# Patient Record
Sex: FEMALE | Race: WHITE | NOT HISPANIC OR LATINO | Employment: FULL TIME | URBAN - NONMETROPOLITAN AREA
[De-identification: names, ages, dates, MRNs, and addresses within clinical notes are randomized per-mention and may not be internally consistent; named-entity substitution may affect disease eponyms.]

---

## 2017-02-02 ENCOUNTER — OFFICE VISIT (OUTPATIENT)
Dept: FAMILY MEDICINE CLINIC | Facility: CLINIC | Age: 37
End: 2017-02-02

## 2017-02-02 VITALS
HEIGHT: 69 IN | SYSTOLIC BLOOD PRESSURE: 108 MMHG | OXYGEN SATURATION: 98 % | TEMPERATURE: 97.7 F | DIASTOLIC BLOOD PRESSURE: 76 MMHG | WEIGHT: 160 LBS | BODY MASS INDEX: 23.7 KG/M2 | HEART RATE: 80 BPM

## 2017-02-02 DIAGNOSIS — N76.0 ACUTE VAGINITIS: Primary | ICD-10-CM

## 2017-02-02 PROCEDURE — 99213 OFFICE O/P EST LOW 20 MIN: CPT | Performed by: FAMILY MEDICINE

## 2017-02-02 RX ORDER — METRONIDAZOLE 500 MG/1
500 TABLET ORAL 2 TIMES DAILY
Qty: 14 TABLET | Refills: 0 | Status: SHIPPED | OUTPATIENT
Start: 2017-02-02 | End: 2017-03-30

## 2017-02-02 RX ORDER — FLUCONAZOLE 150 MG/1
TABLET ORAL
Qty: 2 TABLET | Refills: 0 | Status: SHIPPED | OUTPATIENT
Start: 2017-02-02 | End: 2017-03-30

## 2017-02-02 NOTE — PATIENT INSTRUCTIONS
Monilial Vaginitis  Vaginitis in a soreness, swelling and redness (inflammation) of the vagina and vulva. Monilial vaginitis is not a sexually transmitted infection.  CAUSES   Yeast vaginitis is caused by yeast (candida) that is normally found in your vagina. With a yeast infection, the candida has overgrown in number to a point that upsets the chemical balance.  SYMPTOMS   · White, thick vaginal discharge.  · Swelling, itching, redness and irritation of the vagina and possibly the lips of the vagina (vulva).  · Burning or painful urination.  · Painful intercourse.  DIAGNOSIS   Things that may contribute to monilial vaginitis are:  · Postmenopausal and virginal states.  · Pregnancy.  · Infections.  · Being tired, sick or stressed, especially if you had monilial vaginitis in the past.  · Diabetes. Good control will help lower the chance.  · Birth control pills.  · Tight fitting garments.  · Using bubble bath, feminine sprays, douches or deodorant tampons.  · Taking certain medications that kill germs (antibiotics).  · Sporadic recurrence can occur if you become ill.  TREATMENT   Your caregiver will give you medication.  · There are several kinds of anti monilial vaginal creams and suppositories specific for monilial vaginitis. For recurrent yeast infections, use a suppository or cream in the vagina 2 times a week, or as directed.  · Anti-monilial or steroid cream for the itching or irritation of the vulva may also be used. Get your caregiver's permission.  · Painting the vagina with methylene blue solution may help if the monilial cream does not work.  · Eating yogurt may help prevent monilial vaginitis.  HOME CARE INSTRUCTIONS   · Finish all medication as prescribed.  · Do not have sex until treatment is completed or after your caregiver tells you it is okay.  · Take warm sitz baths.  · Do not douche.  · Do not use tampons, especially scented ones.  · Wear cotton underwear.  · Avoid tight pants and panty  hose.  · Tell your sexual partner that you have a yeast infection. They should go to their caregiver if they have symptoms such as mild rash or itching.  · Your sexual partner should be treated as well if your infection is difficult to eliminate.  · Practice safer sex. Use condoms.  · Some vaginal medications cause latex condoms to fail. Vaginal medications that harm condoms are:  ¨ Cleocin cream.  ¨ Butoconazole (Femstat®).  ¨ Terconazole (Terazol®) vaginal suppository.  ¨ Miconazole (Monistat®) (may be purchased over the counter).  SEEK MEDICAL CARE IF:   · You have a temperature by mouth above 102° F (38.9° C).  · The infection is getting worse after 2 days of treatment.  · The infection is not getting better after 3 days of treatment.  · You develop blisters in or around your vagina.  · You develop vaginal bleeding, and it is not your menstrual period.  · You have pain when you urinate.  · You develop intestinal problems.  · You have pain with sexual intercourse.     This information is not intended to replace advice given to you by your health care provider. Make sure you discuss any questions you have with your health care provider.     Document Released: 09/27/2006 Document Revised: 03/11/2013 Document Reviewed: 06/20/2016  [employer] Interactive Patient Education ©2016 [employer]      Bacterial Vaginosis  Bacterial vaginosis is a vaginal infection that occurs when the normal balance of bacteria in the vagina is disrupted. It results from an overgrowth of certain bacteria. This is the most common vaginal infection in women of childbearing age. Treatment is important to prevent complications, especially in pregnant women, as it can cause a premature delivery.  CAUSES   Bacterial vaginosis is caused by an increase in harmful bacteria that are normally present in smaller amounts in the vagina. Several different kinds of bacteria can cause bacterial vaginosis. However, the reason that the condition develops is  not fully understood.  RISK FACTORS  Certain activities or behaviors can put you at an increased risk of developing bacterial vaginosis, including:  · Having a new sex partner or multiple sex partners.  · Douching.  · Using an intrauterine device (IUD) for contraception.  Women do not get bacterial vaginosis from toilet seats, bedding, swimming pools, or contact with objects around them.  SIGNS AND SYMPTOMS   Some women with bacterial vaginosis have no signs or symptoms. Common symptoms include:  · Grey vaginal discharge.  · A fishlike odor with discharge, especially after sexual intercourse.  · Itching or burning of the vagina and vulva.  · Burning or pain with urination.  DIAGNOSIS   Your health care provider will take a medical history and examine the vagina for signs of bacterial vaginosis. A sample of vaginal fluid may be taken. Your health care provider will look at this sample under a microscope to check for bacteria and abnormal cells. A vaginal pH test may also be done.   TREATMENT   Bacterial vaginosis may be treated with antibiotic medicines. These may be given in the form of a pill or a vaginal cream. A second round of antibiotics may be prescribed if the condition comes back after treatment. Because bacterial vaginosis increases your risk for sexually transmitted diseases, getting treated can help reduce your risk for chlamydia, gonorrhea, HIV, and herpes.  HOME CARE INSTRUCTIONS   · Only take over-the-counter or prescription medicines as directed by your health care provider.  · If antibiotic medicine was prescribed, take it as directed. Make sure you finish it even if you start to feel better.  · Tell all sexual partners that you have a vaginal infection. They should see their health care provider and be treated if they have problems, such as a mild rash or itching.  · During treatment, it is important that you follow these instructions:  ¨ Avoid sexual activity or use condoms correctly.  ¨ Do not  douche.  ¨ Avoid alcohol as directed by your health care provider.  ¨ Avoid breastfeeding as directed by your health care provider.  SEEK MEDICAL CARE IF:   · Your symptoms are not improving after 3 days of treatment.  · You have increased discharge or pain.  · You have a fever.  MAKE SURE YOU:   · Understand these instructions.  · Will watch your condition.  · Will get help right away if you are not doing well or get worse.  FOR MORE INFORMATION   Centers for Disease Control and Prevention, Division of STD Prevention: www.cdc.gov/std  American Sexual Health Association (GENIE): www.ashastd.org      This information is not intended to replace advice given to you by your health care provider. Make sure you discuss any questions you have with your health care provider.     Document Released: 12/18/2006 Document Revised: 01/08/2016 Document Reviewed: 07/30/2014  Elsevier Interactive Patient Education ©2016 [employer]

## 2017-02-03 NOTE — PROGRESS NOTES
Subjective   Leigh Negron is a 37 y.o. female.     Vaginal Discharge   The patient's primary symptoms include vaginal discharge. The patient's pertinent negatives include no genital lesions or pelvic pain. Primary symptoms comment: rectal itching as well. This is a new problem. The current episode started 1 to 4 weeks ago. The problem occurs constantly. The problem has been gradually worsening. The patient is experiencing no pain. She is not pregnant (IUD in place). Pertinent negatives include no abdominal pain, chills, diarrhea, discolored urine, dysuria, fever, hematuria, joint swelling, nausea, painful intercourse, rash, sore throat, urgency or vomiting. The vaginal discharge was grey, white and thick. There has been no bleeding. The symptoms are aggravated by intercourse. She has tried nothing for the symptoms. She is sexually active. No, her partner does not have an STD. She uses an IUD for contraception.   PAP normal 8/2016.    Review of Systems   Constitutional: Negative for chills, fatigue, fever and unexpected weight change.   HENT: Negative.  Negative for sore throat.    Eyes: Negative.  Negative for discharge.   Respiratory: Negative.    Cardiovascular: Negative.    Gastrointestinal: Negative for abdominal pain, blood in stool, diarrhea, nausea and vomiting.   Genitourinary: Positive for vaginal discharge. Negative for dysuria, genital sores, hematuria, pelvic pain and urgency.        See HPI   Musculoskeletal: Negative.    Skin: Negative for rash.        See HPI   Neurological: Negative.      Objective    Vitals:    02/02/17 0811   BP: 108/76   Pulse: 80   Temp: 97.7 °F (36.5 °C)   SpO2: 98%     Body mass index is 23.63 kg/(m^2).  Last 2 weights    02/02/17  0811   Weight: 160 lb (72.6 kg)     Physical Exam   Constitutional: She is oriented to person, place, and time. She appears well-developed and well-nourished. She is cooperative. She does not appear ill. No distress.   HENT:   Mouth/Throat:  Oropharynx is clear and moist and mucous membranes are normal. Mucous membranes are not dry.   Eyes: Conjunctivae and lids are normal.   Abdominal: Soft. There is no tenderness.   Genitourinary: Rectal exam shows no external hemorrhoid, no fissure (no rash/lesion) and no mass. Pelvic exam was performed with patient supine. There is no rash or lesion on the right labia. There is no rash or lesion on the left labia. No erythema, tenderness or bleeding in the vagina. Vaginal discharge (thick, white/gray, moderate amount) found.   Neurological: She is alert and oriented to person, place, and time. Gait normal.   Skin: Skin is warm and dry. No lesion and no rash noted.   Psychiatric: She has a normal mood and affect. Her behavior is normal.   Nursing note and vitals reviewed.    Assessment/Plan   Leigh was seen today for anal itching and vaginal discharge.    Diagnoses and all orders for this visit:    Acute vaginitis  -     metroNIDAZOLE (FLAGYL) 500 MG tablet; Take 1 tablet by mouth 2 (Two) Times a Day.  -     fluconazole (DIFLUCAN) 150 MG tablet; 1 po now and repeat in 1 week     Suspect candidiasis +/- BV. Cover with meds as above.   GC/Chlamydia testing pending.  Vaginosis Panel pending.  I will contact patient regarding test results and provide instructions regarding any necessary changes in plan of care.  Patient was encouraged to keep me informed of any acute changes, lack of improvement, or any new concerning symptoms.  Educational handouts describing preventive measures provided.  Patient voiced understanding of all instructions and denied further questions.

## 2017-03-30 ENCOUNTER — OFFICE VISIT (OUTPATIENT)
Dept: FAMILY MEDICINE CLINIC | Facility: CLINIC | Age: 37
End: 2017-03-30

## 2017-03-30 VITALS
WEIGHT: 158 LBS | BODY MASS INDEX: 23.4 KG/M2 | HEART RATE: 84 BPM | DIASTOLIC BLOOD PRESSURE: 80 MMHG | OXYGEN SATURATION: 98 % | SYSTOLIC BLOOD PRESSURE: 118 MMHG | HEIGHT: 69 IN

## 2017-03-30 DIAGNOSIS — B37.31 VAGINAL CANDIDIASIS: ICD-10-CM

## 2017-03-30 LAB
BILIRUB BLD-MCNC: NEGATIVE MG/DL
CLARITY, POC: ABNORMAL
COLOR UR: YELLOW
GLUCOSE UR STRIP-MCNC: NEGATIVE MG/DL
KETONES UR QL: NEGATIVE
LEUKOCYTE EST, POC: NEGATIVE
NITRITE UR-MCNC: NEGATIVE MG/ML
PH UR: 6 [PH] (ref 5–8)
PROT UR STRIP-MCNC: NEGATIVE MG/DL
RBC # UR STRIP: NEGATIVE /UL
SP GR UR: 1.02 (ref 1–1.03)
UROBILINOGEN UR QL: NORMAL

## 2017-03-30 PROCEDURE — 81003 URINALYSIS AUTO W/O SCOPE: CPT | Performed by: NURSE PRACTITIONER

## 2017-03-30 PROCEDURE — 99213 OFFICE O/P EST LOW 20 MIN: CPT | Performed by: NURSE PRACTITIONER

## 2017-03-30 RX ORDER — FLUCONAZOLE 150 MG/1
150 TABLET ORAL ONCE
Qty: 2 TABLET | Refills: 0 | Status: SHIPPED | OUTPATIENT
Start: 2017-03-30 | End: 2017-03-30

## 2017-03-30 NOTE — PROGRESS NOTES
Subjective   Leigh Negron is a 37 y.o. female.     HPI Comments: The patient is here with complaints of clumpy, white vaginal discharge and itching since having intercourse with her partner yesterday.  She states she also had BV and a yeast infection last time they had intercourse.  She states the two have a long sexual history and neither have ever had any trouble until now.  They broke up a few months ago but have recently gotten back together and she states both of her recent vaginal infections correlate with their sexual encounters.  She states her partner has no symptoms and has also had urine STD testing which was negative.  He does have frequent fungal rashes on his skin, especially under his arms.  He is scheduled to have further blood testing on Monday.  She was screened at her last visit for STDs, DM II, HIV, and Hepatitis which were all negative; she was positive for BV and yeast.  She is interested in figuring out why she is suddenly having vaginal problems after sex.  She has an IUD and has not recently taken antibiotics.  She does not douche.  She uses tampons, washes with regular soap, wears cotton underwear, avoids baths, and keeps herself clean.  Again, she states she never has any issues until they have sex which wasn't an issue until getting back together recently after before breaking up a few months ago.  She used to follow with GYN but has not been back because she felt like nothing was being done to figure out why she is suddenly getting vaginal infections.  She does have genital herpes which is managed with daily Valtrex and she is not currently experiencing an outbreak.       The following portions of the patient's history were reviewed and updated as appropriate: allergies, current medications, past family history, past medical history, past social history, past surgical history and problem list.    Review of Systems   Constitutional: Negative.    HENT: Negative.    Eyes: Negative.     Respiratory: Negative.    Cardiovascular: Negative.    Gastrointestinal: Negative.    Endocrine: Negative.    Genitourinary: Positive for vaginal discharge. Negative for difficulty urinating, dyspareunia, dysuria, genital sores, hematuria and menstrual problem.        Itching, discomfort   Musculoskeletal: Negative.    Skin: Negative.    Allergic/Immunologic: Negative.    Neurological: Negative for dizziness, syncope, weakness and numbness.   Hematological: Negative.  Negative for adenopathy.   Psychiatric/Behavioral: Negative.  Negative for confusion and suicidal ideas. The patient is not nervous/anxious.        Objective   Physical Exam   Constitutional: She is oriented to person, place, and time. Vital signs are normal. She appears well-developed and well-nourished. No distress.   Eyes: Lids are normal.   Cardiovascular: Normal rate.    Pulmonary/Chest: Effort normal.   Neurological: She is alert and oriented to person, place, and time.   Skin: Skin is warm and dry. No rash noted.   Psychiatric: She has a normal mood and affect. Her behavior is normal. Judgment and thought content normal.   Nursing note and vitals reviewed.      Assessment/Plan   Leigh was seen today for vaginal discharge.    Diagnoses and all orders for this visit:    Vaginal candidiasis  -     fluconazole (DIFLUCAN) 150 MG tablet; Take 1 tablet by mouth 1 (One) Time for 1 dose. Take one now and one in 7 days       POC urinalysis performed in office today negative.  Diflucan prescribed for treatment of vaginal candidiasis.  I informed the patient she could also use OTC treatments such as Monistat in addition to the oral treatment.  I advised the patient to take a daily women's probiotic, to eat yogurt / cottage cheese, avoid tampons, douching, baths, change underwear often, and use condoms during sex.  We also spoke about her partner possibly needing a urethral swab for further testing / culture.  She states her partner is prone to outer  skin yeast rashes so we discussed the possibility of him having diabetes.  We also talked about possibly referring the patient to a different GYN and or ID.      Patient was encouraged to keep me informed of any acute changes, lack of improvement, or any new concerning symptoms.  Patient voiced understanding of all instructions and denied further questions.    Patient to follow up PRN.

## 2017-06-16 RX ORDER — VALACYCLOVIR HYDROCHLORIDE 500 MG/1
500 TABLET, FILM COATED ORAL DAILY
Qty: 90 TABLET | Refills: 1 | OUTPATIENT
Start: 2017-06-16 | End: 2018-09-12 | Stop reason: SDUPTHER

## 2017-06-30 ENCOUNTER — TELEPHONE (OUTPATIENT)
Dept: FAMILY MEDICINE CLINIC | Facility: CLINIC | Age: 37
End: 2017-06-30

## 2017-06-30 ENCOUNTER — HOSPITAL ENCOUNTER (OUTPATIENT)
Dept: ULTRASOUND IMAGING | Facility: HOSPITAL | Age: 37
Discharge: HOME | End: 2017-06-30
Admitting: FAMILY MEDICINE

## 2017-06-30 ENCOUNTER — OFFICE VISIT (OUTPATIENT)
Dept: FAMILY MEDICINE CLINIC | Facility: CLINIC | Age: 37
End: 2017-06-30

## 2017-06-30 ENCOUNTER — APPOINTMENT (OUTPATIENT)
Dept: LAB | Facility: HOSPITAL | Age: 37
End: 2017-06-30

## 2017-06-30 VITALS
HEIGHT: 69 IN | SYSTOLIC BLOOD PRESSURE: 108 MMHG | OXYGEN SATURATION: 99 % | WEIGHT: 161 LBS | BODY MASS INDEX: 23.85 KG/M2 | HEART RATE: 82 BPM | TEMPERATURE: 98.4 F | DIASTOLIC BLOOD PRESSURE: 70 MMHG

## 2017-06-30 DIAGNOSIS — R10.32 LLQ PAIN: ICD-10-CM

## 2017-06-30 DIAGNOSIS — R10.32 LLQ PAIN: Primary | ICD-10-CM

## 2017-06-30 DIAGNOSIS — R82.90 ABNORMAL URINALYSIS: ICD-10-CM

## 2017-06-30 LAB
BILIRUB BLD-MCNC: NEGATIVE MG/DL
CLARITY, POC: CLEAR
COLOR UR: YELLOW
GLUCOSE UR STRIP-MCNC: NEGATIVE MG/DL
KETONES UR QL: NEGATIVE
LEUKOCYTE EST, POC: ABNORMAL
NITRITE UR-MCNC: NEGATIVE MG/ML
PH UR: 6.5 [PH] (ref 5–8)
PROT UR STRIP-MCNC: NEGATIVE MG/DL
RBC # UR STRIP: NEGATIVE /UL
SP GR UR: 1.01 (ref 1–1.03)
UROBILINOGEN UR QL: NORMAL

## 2017-06-30 PROCEDURE — 76830 TRANSVAGINAL US NON-OB: CPT

## 2017-06-30 PROCEDURE — 99214 OFFICE O/P EST MOD 30 MIN: CPT | Performed by: FAMILY MEDICINE

## 2017-06-30 PROCEDURE — 81003 URINALYSIS AUTO W/O SCOPE: CPT | Performed by: FAMILY MEDICINE

## 2017-06-30 RX ORDER — DOXYCYCLINE 100 MG/1
100 TABLET ORAL 2 TIMES DAILY
Qty: 20 TABLET | Refills: 0 | Status: SHIPPED | OUTPATIENT
Start: 2017-06-30 | End: 2017-06-30 | Stop reason: SDUPTHER

## 2017-06-30 RX ORDER — DOXYCYCLINE 100 MG/1
100 TABLET ORAL 2 TIMES DAILY
Qty: 20 TABLET | Refills: 0 | Status: SHIPPED | OUTPATIENT
Start: 2017-06-30 | End: 2017-07-10

## 2017-06-30 RX ORDER — METRONIDAZOLE 500 MG/1
500 TABLET ORAL 2 TIMES DAILY
Qty: 20 TABLET | Refills: 0 | Status: SHIPPED | OUTPATIENT
Start: 2017-06-30 | End: 2018-05-15

## 2017-06-30 RX ORDER — METRONIDAZOLE 500 MG/1
500 TABLET ORAL 2 TIMES DAILY
Qty: 20 TABLET | Refills: 0 | Status: SHIPPED | OUTPATIENT
Start: 2017-06-30 | End: 2017-06-30 | Stop reason: SDUPTHER

## 2017-06-30 NOTE — TELEPHONE ENCOUNTER
----- Message from Rosa Isela Lewis sent at 6/30/2017  4:11 PM EDT -----  Regarding: RESULTS   Pt informed of US results and instructed to  flagyl at her pharmacy. Pt voiced understanding and had no questions.

## 2017-06-30 NOTE — PROGRESS NOTES
Subjective   Leigh Negron is a 37 y.o. female.     Abdominal Pain   This is a new problem. The current episode started in the past 7 days. The onset quality is gradual. The problem occurs constantly. The problem has been gradually worsening. The pain is located in the LLQ. The pain is moderate. The quality of the pain is sharp (stabbing). The abdominal pain radiates to the suprapubic region. Associated symptoms include constipation (but better after use of laxative), a fever (subjective 2 days ago; resolved) and nausea. Pertinent negatives include no anorexia, arthralgias, diarrhea, dysuria, frequency, headaches, hematochezia, hematuria, melena, myalgias, vomiting or weight loss. The pain is aggravated by movement, certain positions and urination. The pain is relieved by being still. She has tried nothing for the symptoms. Her past medical history is significant for abdominal surgery (CS).   Used CRYS cup for menses for the first time which began 6/21. Now resolved x 3-4 days.     The following portions of the patient's history were reviewed and updated as appropriate: allergies, current medications, past family history, past medical history, past social history, past surgical history and problem list.    Review of Systems   Constitutional: Positive for fatigue and fever (subjective 2 days ago; resolved). Negative for unexpected weight change and weight loss.   HENT: Negative.    Eyes: Negative.    Respiratory: Negative.    Cardiovascular: Negative.    Gastrointestinal: Positive for abdominal pain, constipation (but better after use of laxative) and nausea. Negative for anorexia, blood in stool, diarrhea, hematochezia, melena, rectal pain and vomiting.   Genitourinary: Negative for dyspareunia, dysuria, frequency, hematuria, urgency and vaginal discharge.   Musculoskeletal: Negative for arthralgias and myalgias.   Skin: Negative for rash.   Neurological: Negative.  Negative for headaches.    Psychiatric/Behavioral: Negative.        Objective    Vitals:    06/30/17 1021   BP: 108/70   Pulse: 82   Temp: 98.4 °F (36.9 °C)   SpO2: 99%     Body mass index is 23.78 kg/(m^2).  Last 2 weights    06/30/17  1021   Weight: 161 lb (73 kg)       Physical Exam   Constitutional: She is oriented to person, place, and time. She appears well-developed and well-nourished. She is cooperative. She does not appear ill. No distress.   HENT:   Head: Normocephalic and atraumatic.   Mouth/Throat: Mucous membranes are normal. Mucous membranes are not dry.   Eyes: Conjunctivae and lids are normal.   Neck: Phonation normal. Neck supple. Normal carotid pulses present. No thyroid mass and no thyromegaly present.   Cardiovascular: Normal rate, regular rhythm and intact distal pulses.    Pulmonary/Chest: Effort normal and breath sounds normal.   Abdominal: Soft. She exhibits no distension and no mass. Bowel sounds are increased. There is no hepatosplenomegaly. There is tenderness (moderate) in the suprapubic area and left lower quadrant. There is guarding (mild voluntary). There is no rigidity, no rebound and no CVA tenderness.           Vascular Status -  Her exam exhibits no right foot edema. Her exam exhibits no left foot edema.  Lymphadenopathy:     She has no cervical adenopathy.   Neurological: She is alert and oriented to person, place, and time. She has normal strength. Gait normal.   Skin: Skin is warm and dry. No bruising and no rash noted.   Multiple tattoos   Psychiatric: She has a normal mood and affect. Her behavior is normal. Cognition and memory are normal.   Nursing note and vitals reviewed.    Recent Results (from the past 24 hour(s))   POC Urinalysis Dipstick, Automated    Collection Time: 06/30/17 11:50 AM   Result Value Ref Range    Color Yellow Yellow, Straw, Dark Yellow, Annabel    Clarity, UA Clear Clear    Glucose, UA Negative Negative, 1000 mg/dL (3+) mg/dL    Bilirubin Negative Negative    Ketones, UA  Negative Negative    Specific Gravity  1.015 1.005 - 1.030    Blood, UA Negative Negative    pH, Urine 6.5 5.0 - 8.0    Protein, POC Negative Negative mg/dL    Urobilinogen, UA Normal Normal    Leukocytes 25 Juvencio/ul (A) Negative    Nitrite, UA Negative Negative     Assessment/Plan   Crystal was seen today for abdominal pain.    Diagnoses and all orders for this visit:    LLQ pain  -     US Non-ob Transvaginal; Future  -     Cancel: C Trachomatis / N Gonorrhoeae PCR  -     POC Urinalysis Dipstick, Automated    Abnormal urinalysis  -     Cancel: Urine Culture  -     Cancel: C Trachomatis / N Gonorrhoeae PCR  -     POC Urinalysis Dipstick, Automated    Other orders  -     Discontinue: metroNIDAZOLE (FLAGYL) 500 MG tablet; Take 1 tablet by mouth 2 (Two) Times a Day.  -     Discontinue: doxycycline (ADOXA) 100 MG tablet; Take 1 tablet by mouth 2 (Two) Times a Day for 10 days.  -     doxycycline (ADOXA) 100 MG tablet; Take 1 tablet by mouth 2 (Two) Times a Day for 10 days.  -     metroNIDAZOLE (FLAGYL) 500 MG tablet; Take 1 tablet by mouth 2 (Two) Times a Day.    Pain possibly due to UTI, ovarian cyst, diverticulitis, etc. No changes in BM, no leslie urinary symptoms, no fever. Cover for urinary and/or pelvic infection. TVUS as above.  I will contact patient regarding test results and provide instructions regarding any necessary changes in plan of care.  Patient was encouraged to keep me informed of any acute changes, lack of improvement, or any new concerning symptoms.  Pt is aware of reasons to seek emergent care.  Patient voiced understanding of all instructions and denied further questions.

## 2017-07-04 LAB
BACTERIA UR CULT: ABNORMAL
BACTERIA UR CULT: ABNORMAL
C TRACH RRNA SPEC QL NAA+PROBE: NEGATIVE
N GONORRHOEA RRNA SPEC QL NAA+PROBE: NEGATIVE
OTHER ANTIBIOTIC SUSC ISLT: ABNORMAL

## 2018-05-15 ENCOUNTER — OFFICE VISIT (OUTPATIENT)
Dept: FAMILY MEDICINE CLINIC | Facility: CLINIC | Age: 38
End: 2018-05-15

## 2018-05-15 VITALS
SYSTOLIC BLOOD PRESSURE: 120 MMHG | HEART RATE: 78 BPM | OXYGEN SATURATION: 98 % | HEIGHT: 69 IN | WEIGHT: 173 LBS | DIASTOLIC BLOOD PRESSURE: 72 MMHG | BODY MASS INDEX: 25.62 KG/M2

## 2018-05-15 DIAGNOSIS — R10.2 PELVIC PAIN: ICD-10-CM

## 2018-05-15 DIAGNOSIS — Z20.2 EXPOSURE TO CHLAMYDIA: Primary | ICD-10-CM

## 2018-05-15 PROCEDURE — 99213 OFFICE O/P EST LOW 20 MIN: CPT | Performed by: FAMILY MEDICINE

## 2018-05-15 NOTE — PROGRESS NOTES
"Subjective   Crystal Eveline Negron is a 38 y.o. female.     History of Present Illness  Ms. Negron presents today with concern about STD. She has new partner. Found out he has had Chlamydia. He reported being tx'd with \"a shot\". She does not when this occurred exactly. She denies change in vaginal d/c (has it chronically). Has had some pelvic pain, left lower side. LNMP 2 weeks ago. She has IUD. No fever, n/v. No change in bowel habits. No rash or joint pain.    The following portions of the patient's history were reviewed and updated as appropriate: allergies, current medications, past family history, past medical history, past social history, past surgical history and problem list.    Review of Systems   Constitutional: Negative for appetite change, fatigue and fever.   HENT: Negative for mouth sores and sore throat.    Eyes: Negative for pain, discharge and redness.   Respiratory: Negative for cough.    Gastrointestinal: Negative for nausea and vomiting.   Genitourinary: Positive for pelvic pain and vaginal discharge. Negative for dysuria, genital sores and hematuria.   Skin: Negative for rash.   Neurological: Negative for headaches.   Psychiatric/Behavioral: Negative for confusion.       Objective    Vitals:    05/15/18 0822   BP: 120/72   Pulse: 78   SpO2: 98%     Body mass index is 25.55 kg/m².  1    05/15/18  0822   Weight: 78.5 kg (173 lb)       Physical Exam   Constitutional: She is oriented to person, place, and time. She appears well-developed and well-nourished. She is cooperative. She does not appear ill. No distress.   HENT:   Mouth/Throat: Oropharynx is clear and moist and mucous membranes are normal. Mucous membranes are not dry. No oral lesions.   Eyes: Conjunctivae and lids are normal.   Neck: Neck supple.   Cardiovascular: Normal rate, regular rhythm, normal heart sounds and intact distal pulses.    Pulmonary/Chest: Effort normal and breath sounds normal.   Abdominal: Soft. Bowel sounds are normal. " She exhibits no distension and no mass. There is no hepatosplenomegaly. There is tenderness (mild) in the left lower quadrant. There is no rigidity, no rebound and no guarding.   Musculoskeletal: Normal range of motion.   Neurological: She is alert and oriented to person, place, and time. Gait normal.   Skin: Skin is warm and dry. No lesion and no rash noted.   Psychiatric: She has a normal mood and affect. Her behavior is normal. Cognition and memory are normal.   Nursing note and vitals reviewed.      Assessment/Plan   Leigh was seen today for exposure to std.    Diagnoses and all orders for this visit:    Exposure to chlamydia  -     Chlamydia trachomatis, Neisseria gonorrhoeae, PCR - Urine, Urine, Clean Catch    Pelvic pain  -     Chlamydia trachomatis, Neisseria gonorrhoeae, PCR - Urine, Urine, Clean Catch    I will contact patient regarding test results and provide instructions regarding any necessary changes in plan of care.  She will f/u for her routine pap/pelvic next month. Would like to have additional STD screening at that time.  Patient was encouraged to keep me informed of any acute changes, lack of improvement, or any new concerning symptoms.  Pt is aware of reasons to seek emergent care.  Patient voiced understanding of all instructions and denied further questions.

## 2018-05-16 LAB
C TRACH RRNA SPEC QL NAA+PROBE: NEGATIVE
N GONORRHOEA RRNA SPEC QL NAA+PROBE: NEGATIVE

## 2018-06-06 ENCOUNTER — OFFICE VISIT (OUTPATIENT)
Dept: FAMILY MEDICINE CLINIC | Facility: CLINIC | Age: 38
End: 2018-06-06

## 2018-06-06 VITALS
OXYGEN SATURATION: 99 % | HEART RATE: 78 BPM | HEIGHT: 69 IN | WEIGHT: 173 LBS | BODY MASS INDEX: 25.62 KG/M2 | DIASTOLIC BLOOD PRESSURE: 72 MMHG | SYSTOLIC BLOOD PRESSURE: 120 MMHG

## 2018-06-06 DIAGNOSIS — Z11.4 SCREENING FOR HIV (HUMAN IMMUNODEFICIENCY VIRUS): ICD-10-CM

## 2018-06-06 DIAGNOSIS — Z11.59 NEED FOR HEPATITIS C SCREENING TEST: ICD-10-CM

## 2018-06-06 DIAGNOSIS — Z01.419 WELL WOMAN EXAM WITH ROUTINE GYNECOLOGICAL EXAM: Primary | ICD-10-CM

## 2018-06-06 DIAGNOSIS — M25.521 BILATERAL ELBOW JOINT PAIN: ICD-10-CM

## 2018-06-06 DIAGNOSIS — N76.1 CHRONIC VAGINITIS: ICD-10-CM

## 2018-06-06 DIAGNOSIS — M25.522 BILATERAL ELBOW JOINT PAIN: ICD-10-CM

## 2018-06-06 DIAGNOSIS — Z11.3 SCREENING FOR STD (SEXUALLY TRANSMITTED DISEASE): ICD-10-CM

## 2018-06-06 PROCEDURE — 99213 OFFICE O/P EST LOW 20 MIN: CPT | Performed by: FAMILY MEDICINE

## 2018-06-06 PROCEDURE — 99395 PREV VISIT EST AGE 18-39: CPT | Performed by: FAMILY MEDICINE

## 2018-06-06 NOTE — PATIENT INSTRUCTIONS
[facility]  Ask your health care provider which exercises are safe for you. Do exercises exactly as told by your health care provider and adjust them as directed. It is normal to feel mild stretching, pulling, tightness, or discomfort as you do these exercises, but you should stop right away if you feel sudden pain or your pain gets worse. Do not begin these exercises until told by your health care provider.  Stretching and range of motion exercises  These exercises warm up your muscles and joints and improve the movement and flexibility of your elbow.  Exercise A: Wrist flexors     1. Straighten your left / right elbow in front of you with your palm up. If told by your health care provider, do this stretch with your elbow bent rather than straight.  2. With your other hand, gently pull your left / right hand and fingers toward you until you feel a gentle stretch on the top of your forearm.  3. Hold this position for __________ seconds.  Repeat __________ times. Complete this exercise __________ times a day.  Strengthening exercises  These exercises build strength and endurance in your elbow. Endurance is the ability to use your muscles for a long time, even after several repetitions.  Exercise B: Wrist flexion     1. Sit with your left / right forearm palm-up and supported on a table or other surface.  2. Let your left / right wrist extend over the edge of the surface.  3. Hold a __________ weight or a piece of rubber exercise band or tubing. Slowly curl your hand up toward your forearm. Try to only move your hand and keep the rest of your arm still.  4. Hold this position for __________ seconds.  5. Slowly return to the starting position.  Repeat __________ times. Complete this exercise __________ times a day.  Exercise C: Eccentric wrist flexion   1. Sit with your left / right forearm palm-up and supported on a table or other surface.  2. Let your left / right wrist extend over the edge of the  surface.  3. Hold a __________ weight or a piece of rubber exercise band or tubing.  4. Use your other hand to move your left / right hand up toward your forearm.  5. Slowly return to the starting position using only your left / right hand.  Repeat __________ times. Complete this exercise __________ times a day.  Exercise D: Hand turns, pronation i   1. Sit with your left / right forearm supported on a table or other surface.  2. Move your wrist so your pinkie travels toward your forearm and your thumb moves away from your forearm.  3. Hold this position for __________ seconds.  4. Slowly return to the starting position.  Exercise E: Hand turns, pronation ii     1. Sit with your left / right forearm supported on a table or other surface.  2. Hold a hammer in your left / right hand.  ¨ The exercise will be easier if you hold on near the head of the hammer.  ¨ If you hold on toward the end of the handle, the exercise will be harder.  3. Rest your left / right hand over the edge of the table with your palm facing up.  4. Without moving your elbow, slowly turn your palm and your hand down toward the table.  5. Hold this position for __________ seconds.  6. Slowly return to the starting position.  Repeat __________ times. Complete this exercise __________ times a day.  Exercise F: Shoulder blade squeeze   1. Sit in a stable chair with good posture. Do not let your back touch the back of the chair.  2. Your arms should be at your sides with your elbows bent. You can rest your forearms on a pillow.  3. Squeeze your shoulder blades together. Keep your shoulders level. Do not lift your shoulders up toward your ears.  4. Hold this position for __________ seconds.  5. Slowly release.  Repeat __________ times. Complete this exercise __________ times a day.  This information is not intended to replace advice given to you by your health care provider. Make sure you discuss any questions you have with your health care  provider.  Document Released: 12/18/2006 Document Revised: 08/24/2017 Document Reviewed: 08/29/2016  "[employer]" Interactive Patient Education © 2017 "[employer] Inc[employer]    Lateral Epicondylitis With Rehab  Lateral epicondylitis involves inflammation and pain around the outer portion of the elbow. The pain is caused by inflammation of the tendons in the forearm that bring back (extend) the wrist. Lateral epicondylitis is also called tennis elbow, because it is very common in tennis players. However, it may occur in any individual who extends the wrist repetitively. If lateral epicondylitis is left untreated, it may become a chronic problem.  SYMPTOMS   · Pain, tenderness, and inflammation on the outer (lateral) side of the elbow.  · Pain or weakness with gripping activities.  · Pain that increases with wrist-twisting motions (playing tennis, using a screwdriver, opening a door or a jar).  · Pain with lifting objects, including a coffee cup.  CAUSES   Lateral epicondylitis is caused by inflammation of the tendons that extend the wrist. Causes of injury may include:  · Repetitive stress and strain on the muscles and tendons that extend the wrist.  · Sudden change in activity level or intensity.  · Incorrect  in racquet sports.  · Incorrect  size of racquet (often too large).  · Incorrect hitting position or technique (usually backhand, leading with the elbow).  · Using a racket that is too heavy.  RISK INCREASES WITH:  · Sports or occupations that require repetitive and/or strenuous forearm and wrist movements (tennis, squash, racquetball, carpentry).  · Poor wrist and forearm strength and flexibility.  · Failure to warm up properly before activity.  · Resuming activity before healing, rehabilitation, and conditioning are complete.  PREVENTION   · Warm up and stretch properly before activity.  · Maintain physical fitness:    Strength, flexibility, and endurance.    Cardiovascular fitness.  · Wear and use properly  fitted equipment.  · Learn and use proper technique and have a  correct improper technique.  · Wear a tennis elbow (counterforce) brace.  PROGNOSIS   The course of this condition depends on the degree of the injury. If treated properly, acute cases (symptoms lasting less than 4 weeks) are often resolved in 2 to 6 weeks. Chronic (longer lasting cases) often resolve in 3 to 6 months but may require physical therapy.  RELATED COMPLICATIONS   · Frequently recurring symptoms, resulting in a chronic problem. Properly treating the problem the first time decreases frequency of recurrence.  · Chronic inflammation, scarring tendon degeneration, and partial tendon tear, requiring surgery.  · Delayed healing or resolution of symptoms.  TREATMENT   Treatment first involves the use of ice and medicine to reduce pain and inflammation. Strengthening and stretching exercises may help reduce discomfort if performed regularly. These exercises may be performed at home if the condition is an acute injury. Chronic cases may require a referral to a physical therapist for evaluation and treatment. Your caregiver may advise a corticosteroid injection to help reduce inflammation. Rarely, surgery is needed.  MEDICATION  · If pain medicine is needed, nonsteroidal anti-inflammatory medicines (aspirin and ibuprofen), or other minor pain relievers (acetaminophen), are often advised.  · Do not take pain medicine for 7 days before surgery.  · Prescription pain relievers may be given, if your caregiver thinks they are needed. Use only as directed and only as much as you need.  · Corticosteroid injections may be recommended. These injections should be reserved only for the most severe cases, because they can only be given a certain number of times.  HEAT AND COLD  · Cold treatment (icing) should be applied for 10 to 15 minutes every 2 to 3 hours for inflammation and pain, and immediately after activity that aggravates your symptoms. Use ice  packs or an ice massage.  · Heat treatment may be used before performing stretching and strengthening activities prescribed by your caregiver, physical therapist, or . Use a heat pack or a warm water soak.  SEEK MEDICAL CARE IF:  Symptoms get worse or do not improve in 2 weeks, despite treatment.  EXERCISES   RANGE OF MOTION (ROM) AND STRETCHING EXERCISES - Epicondylitis, Lateral (Tennis Elbow)  These exercises may help you when beginning to rehabilitate your injury. Your symptoms may go away with or without further involvement from your physician, physical therapist, or . While completing these exercises, remember:   · Restoring tissue flexibility helps normal motion to return to the joints. This allows healthier, less painful movement and activity.  · An effective stretch should be held for at least 30 seconds.  · A stretch should never be painful. You should only feel a gentle lengthening or release in the stretched tissue.  RANGE OF MOTION - Wrist Flexion, Active-Assisted  · Extend your right / left elbow with your fingers pointing down.*  · Gently pull the back of your hand towards you, until you feel a gentle stretch on the top of your forearm.  · Hold this position for __________ seconds.  Repeat __________ times. Complete this exercise __________ times per day.   *If directed by your physician, physical therapist or , complete this stretch with your elbow bent, rather than extended.  RANGE OF MOTION - Wrist Extension, Active-Assisted  · Extend your right / left elbow and turn your palm upwards.*  · Gently pull your palm and fingertips back, so your wrist extends and your fingers point more toward the ground.  · You should feel a gentle stretch on the inside of your forearm.  · Hold this position for __________ seconds.  Repeat __________ times. Complete this exercise __________ times per day.  *If directed by your physician, physical therapist or athletic  , complete this stretch with your elbow bent, rather than extended.  STRETCH - Wrist Flexion  · Place the back of your right / left hand on a tabletop, leaving your elbow slightly bent. Your fingers should point away from your body.  · Gently press the back of your hand down onto the table by straightening your elbow. You should feel a stretch on the top of your forearm.  · Hold this position for __________ seconds.  Repeat __________ times. Complete this stretch __________ times per day.   STRETCH - Wrist Extension   · Place your right / left fingertips on a tabletop, leaving your elbow slightly bent. Your fingers should point backwards.  · Gently press your fingers and palm down onto the table by straightening your elbow. You should feel a stretch on the inside of your forearm.  · Hold this position for __________ seconds.  Repeat __________ times. Complete this stretch __________ times per day.   STRENGTHENING EXERCISES - Epicondylitis, Lateral (Tennis Elbow)  These exercises may help you when beginning to rehabilitate your injury. They may resolve your symptoms with or without further involvement from your physician, physical therapist, or . While completing these exercises, remember:   · Muscles can gain both the endurance and the strength needed for everyday activities through controlled exercises.  · Complete these exercises as instructed by your physician, physical therapist or . Increase the resistance and repetitions only as guided.  · You may experience muscle soreness or fatigue, but the pain or discomfort you are trying to eliminate should never worsen during these exercises. If this pain does get worse, stop and make sure you are following the directions exactly. If the pain is still present after adjustments, discontinue the exercise until you can discuss the trouble with your caregiver.  STRENGTH - Wrist Flexors  · Sit with your right / left forearm palm-up and  fully supported on a table or countertop. Your elbow should be resting below the height of your shoulder. Allow your wrist to extend over the edge of the surface.  · Loosely holding a __________ weight, or a piece of rubber exercise band or tubing, slowly curl your hand up toward your forearm.  · Hold this position for __________ seconds. Slowly lower the wrist back to the starting position in a controlled manner.  Repeat __________ times. Complete this exercise __________ times per day.   STRENGTH - Wrist Extensors  · Sit with your right / left forearm palm-down and fully supported on a table or countertop. Your elbow should be resting below the height of your shoulder. Allow your wrist to extend over the edge of the surface.  · Loosely holding a __________ weight, or a piece of rubber exercise band or tubing, slowly curl your hand up toward your forearm.  · Hold this position for __________ seconds. Slowly lower the wrist back to the starting position in a controlled manner.  Repeat __________ times. Complete this exercise __________ times per day.   STRENGTH - Ulnar Deviators  · Stand with a ____________________ weight in your right / left hand, or sit while holding a rubber exercise band or tubing, with your healthy arm supported on a table or countertop.  · Move your wrist, so that your pinkie travels toward your forearm and your thumb moves away from your forearm.  · Hold this position for __________ seconds and then slowly lower the wrist back to the starting position.  Repeat __________ times. Complete this exercise __________ times per day  STRENGTH - Radial Deviators  · Stand with a ____________________ weight in your right / left hand, or sit while holding a rubber exercise band or tubing, with your injured arm supported on a table or countertop.  · Raise your hand upward in front of you or pull up on the rubber tubing.  · Hold this position for __________ seconds and then slowly lower the wrist back to  "the starting position.  Repeat __________ times. Complete this exercise __________ times per day.  STRENGTH - Forearm Supinators   · Sit with your right / left forearm supported on a table, keeping your elbow below shoulder height. Rest your hand over the edge, palm down.  · Gently  a hammer or a soup ladle.  · Without moving your elbow, slowly turn your palm and hand upward to a \"thumbs-up\" position.  · Hold this position for __________ seconds. Slowly return to the starting position.  Repeat __________ times. Complete this exercise __________ times per day.   STRENGTH - Forearm Pronators   · Sit with your right / left forearm supported on a table, keeping your elbow below shoulder height. Rest your hand over the edge, palm up.  · Gently  a hammer or a soup ladle.  · Without moving your elbow, slowly turn your palm and hand upward to a \"thumbs-up\" position.  · Hold this position for __________ seconds. Slowly return to the starting position.  Repeat __________ times. Complete this exercise __________ times per day.   STRENGTH -   · Grasp a tennis ball, a dense sponge, or a large, rolled sock in your hand.  · Squeeze as hard as you can, without increasing any pain.  · Hold this position for __________ seconds. Release your  slowly.  Repeat __________ times. Complete this exercise __________ times per day.   STRENGTH - Elbow Extensors, Isometric  · Stand or sit upright, on a firm surface. Place your right / left arm so that your palm faces your stomach, and it is at the height of your waist.  · Place your opposite hand on the underside of your forearm. Gently push up as your right / left arm resists. Push as hard as you can with both arms, without causing any pain or movement at your right / left elbow. Hold this stationary position for __________ seconds.  Gradually release the tension in both arms. Allow your muscles to relax completely before repeating.     This information is not intended to " replace advice given to you by your health care provider. Make sure you discuss any questions you have with your health care provider.     Document Released: 12/18/2006 Document Revised: 01/08/2016 Document Reviewed: 09/15/2016  [employer] Interactive Patient Education ©2017 [employer]

## 2018-06-06 NOTE — PROGRESS NOTES
Subjective   Leigh Negron is a 38 y.o. female.     Ms. Jara presents today for routine annual exam/CBE/PAP. She is a Para 1. Is sexually active. Has h/o high risk sexual behavior. IUD for contraception.  Has h/o HR HPV s/p colposcopy.  She has not required LEEP/conization/cryo, etc.  She has remained UTD with pap smears.  She is a former smoker.  Only occasional EtOH use. No illicit drug use.  Generally healthy diet.  Frequent/regular exercise including cardio and strength training.  Denies anxiety/depressive symptoms.  Wants updated STD testing. Would like to have repeat screening.  Has h/o herpes simplex and is on suppressive tx with Valtrex.  Has some increased vaginal d/c, mild pelvic/lower abd pain. She is just finishing up her period and still have some light vaginal bleeding.     She is s/p breast augmentation as of June 2016.  No reported complications with surgery.      She also c/o bilateral elbow pain. Worst on right. Has been exercising heavily, lifting weights. Used to be intermittent but now constant even without exercise. No particular treatments tried. No particular injury.    Female  Problem   The patient's primary symptoms include genital itching, a genital odor and vaginal discharge. The patient's pertinent negatives include no genital lesions, genital rash, missed menses, pelvic pain or vaginal bleeding. The current episode started in the past 7 days. The problem occurs daily. The problem has been unchanged. The pain is mild. The problem affects both sides. She is not pregnant. Associated symptoms include constipation and joint pain. Pertinent negatives include no abdominal pain, anorexia, back pain, chills, diarrhea, discolored urine, dysuria, fever, flank pain, frequency, headaches, hematuria, joint swelling, nausea, painful intercourse, rash, sore throat, urgency or vomiting. The vaginal discharge was copious. She has not been passing clots. She has not been passing tissue.  Nothing aggravates the symptoms. She is sexually active with multiple partners. It is possible that her partner has an STD. She uses an IUD for contraception. Her menstrual history has been irregular.       The following portions of the patient's history were reviewed and updated as appropriate: allergies, current medications, past family history, past medical history, past social history, past surgical history and problem list.    Review of Systems   Constitutional: Negative for chills, fatigue, fever and unexpected weight change.   HENT: Negative for congestion, rhinorrhea and sore throat.    Eyes: Negative for visual disturbance.   Respiratory: Negative for cough, shortness of breath and wheezing.    Cardiovascular: Negative for chest pain, palpitations and leg swelling.   Gastrointestinal: Positive for constipation. Negative for abdominal pain, anorexia, diarrhea, nausea and vomiting.   Genitourinary: Positive for vaginal discharge. Negative for dysuria, flank pain, frequency, hematuria, missed menses, pelvic pain and urgency.   Musculoskeletal: Positive for arthralgias and joint pain. Negative for back pain.   Skin: Negative for rash and wound.   Neurological: Negative for dizziness, tremors, weakness and headaches.   Hematological: Negative for adenopathy. Does not bruise/bleed easily.   Psychiatric/Behavioral: Negative for dysphoric mood and sleep disturbance. The patient is not nervous/anxious.        Objective    Vitals:    06/06/18 1030   BP: 120/72   Pulse: 78   SpO2: 99%     Body mass index is 25.55 kg/m².  1    06/06/18  1030   Weight: 78.5 kg (173 lb)       Physical Exam   Constitutional: She is oriented to person, place, and time. She appears well-developed and well-nourished. She is cooperative. She does not appear ill. No distress.   HENT:   Head: Normocephalic and atraumatic.   Mouth/Throat: Oropharynx is clear and moist and mucous membranes are normal. No oral lesions. Normal dentition.   Eyes:  Conjunctivae, EOM and lids are normal.   Neck: Phonation normal. Neck supple. Normal carotid pulses present. No thyroid mass and no thyromegaly present.   Cardiovascular: Normal rate, regular rhythm, normal heart sounds and intact distal pulses.  Exam reveals no gallop.    No murmur heard.  Pulmonary/Chest: Effort normal and breath sounds normal. She exhibits no deformity and no swelling. Right breast exhibits no inverted nipple, no mass, no nipple discharge, no skin change and no tenderness. Left breast exhibits no inverted nipple, no mass, no nipple discharge, no skin change and no tenderness.   Is s/p mammoplasty   Abdominal: Soft. Bowel sounds are normal. She exhibits no distension and no mass. There is no hepatosplenomegaly. There is tenderness (mild) in the right lower quadrant. There is no rigidity, no rebound, no guarding and no CVA tenderness.   Genitourinary: Pelvic exam was performed with patient supine. There is no rash or lesion on the right labia. There is no rash or lesion on the left labia. Uterus is not enlarged and not tender. Cervix exhibits no motion tenderness and no friability. Right adnexum displays tenderness (mild). Right adnexum displays no mass and no fullness. Left adnexum displays no mass and no tenderness. There is bleeding in the vagina. No erythema or tenderness in the vagina. Vaginal discharge found.   Genitourinary Comments: Cervix facing posteriorly.  IUD string noted in appropriate location. Thin prep pap obtained.   Musculoskeletal:        Right elbow: She exhibits normal range of motion and no swelling. Tenderness found. Medial epicondyle and lateral epicondyle tenderness noted.        Left elbow: She exhibits normal range of motion and no swelling. Tenderness found. Medial epicondyle and lateral epicondyle tenderness noted.     Vascular Status -  Her right foot exhibits no edema. Her left foot exhibits no edema.  Lymphadenopathy:     She has no cervical adenopathy.     She has  no axillary adenopathy.   Neurological: She is alert and oriented to person, place, and time. She has normal strength. She displays no tremor. Gait normal.   Skin: Skin is warm and dry. No bruising, no lesion and no rash noted.   Psychiatric: She has a normal mood and affect. Her speech is normal and behavior is normal. Judgment and thought content normal. Cognition and memory are normal.   Nursing note and vitals reviewed.    Assessment/Plan   Leigh was seen today for annual exam.    Diagnoses and all orders for this visit:    Well woman exam with routine gynecological exam  -     Hepatitis C Antibody  -     HIV-1 / O / 2 Ag / Antibody 4th Generation  -     HSV 1 & 2 - Specific Antibody, IgG  -     RPR    Screening for STD (sexually transmitted disease)  -     HSV 1 & 2 - Specific Antibody, IgG  -     RPR    Screening for HIV (human immunodeficiency virus)  -     HIV-1 / O / 2 Ag / Antibody 4th Generation    Need for hepatitis C screening test  -     Hepatitis C Antibody    Bilateral elbow joint pain    Chronic vaginitis    Thin prep Pap pending.  GC/Chlamydia/trichomonas vaginitis panel pending.    Age appropriate preventive care reviewed including cancer screenings, safety measures, mental health concerns, supplements, prevention of STDs, prevention of CV disease and DM, etc. Handout provided.    I will contact patient regarding test results and provide instructions regarding any necessary changes in plan of care.    Bilateral elbow pain is likely due to overuse injuries with both medial and lateral epicondylitis.  Preventive measures reviewed.  Handout provided for conservative management including stretches, exercises, forearm splint, ice at location, NSAID as needed.    Patient was encouraged to keep me informed of any acute changes, lack of improvement, or any new concerning symptoms.  Follow-up for yearly physical and as needed.  Pt is aware of reasons to seek emergent care.  Patient voiced understanding  of all instructions and denied further questions.

## 2018-06-07 LAB
HCV AB S/CO SERPL IA: <0.1 S/CO RATIO (ref 0–0.9)
HIV 1+2 AB+HIV1 P24 AG SERPL QL IA: NON REACTIVE
HSV1 IGG SER IA-ACNC: <0.91 INDEX (ref 0–0.9)
HSV2 IGG SER IA-ACNC: 9.03 INDEX (ref 0–0.9)
RPR SER QL: NON REACTIVE

## 2018-09-12 ENCOUNTER — OFFICE VISIT (OUTPATIENT)
Dept: FAMILY MEDICINE CLINIC | Facility: CLINIC | Age: 38
End: 2018-09-12

## 2018-09-12 VITALS
SYSTOLIC BLOOD PRESSURE: 122 MMHG | WEIGHT: 165 LBS | DIASTOLIC BLOOD PRESSURE: 80 MMHG | HEIGHT: 69 IN | HEART RATE: 86 BPM | OXYGEN SATURATION: 98 % | BODY MASS INDEX: 24.44 KG/M2

## 2018-09-12 DIAGNOSIS — N89.8 VAGINAL DISCHARGE: ICD-10-CM

## 2018-09-12 DIAGNOSIS — N76.0 ACUTE VAGINITIS: Primary | ICD-10-CM

## 2018-09-12 PROCEDURE — 99213 OFFICE O/P EST LOW 20 MIN: CPT | Performed by: FAMILY MEDICINE

## 2018-09-12 RX ORDER — METRONIDAZOLE 500 MG/1
500 TABLET ORAL 2 TIMES DAILY
Qty: 14 TABLET | Refills: 0 | Status: SHIPPED | OUTPATIENT
Start: 2018-09-12 | End: 2018-09-19

## 2018-09-12 RX ORDER — VALACYCLOVIR HYDROCHLORIDE 500 MG/1
500 TABLET, FILM COATED ORAL DAILY
Qty: 90 TABLET | Refills: 1 | OUTPATIENT
Start: 2018-09-12 | End: 2019-07-30 | Stop reason: SDUPTHER

## 2018-09-12 RX ORDER — ESCITALOPRAM OXALATE 5 MG/1
TABLET ORAL
COMMUNITY
End: 2020-08-12

## 2018-09-12 NOTE — PROGRESS NOTES
Subjective   Leigh Negron is a 38 y.o. female.     Vaginal Discharge   The patient's primary symptoms include a genital odor and vaginal discharge. The patient's pertinent negatives include no genital lesions or genital rash. This is a new problem. The current episode started 1 to 4 weeks ago. The problem occurs constantly. The problem has been gradually worsening. The pain is mild. The problem affects both sides. She is not pregnant (PARAGUARD IUD). Associated symptoms include diarrhea, headaches and nausea. Pertinent negatives include no abdominal pain, back pain, chills, constipation, dysuria, fever, flank pain, frequency, hematuria, rash, sore throat, urgency or vomiting. The vaginal discharge was brown, thick, mucoid and malodorous. There has been no bleeding. Nothing aggravates the symptoms. She has tried nothing for the symptoms. She is sexually active. It is unknown whether or not her partner has an STD. She uses an IUD for contraception. Her menstrual history has been regular. Her past medical history is significant for herpes simplex, an STD and vaginosis.   She is UTD on pap smear.    The following portions of the patient's history were reviewed and updated as appropriate: allergies, current medications, past family history, past medical history, past social history, past surgical history and problem list.    Review of Systems   Constitutional: Negative for chills, fatigue and fever.   HENT: Negative for mouth sores and sore throat.    Eyes: Negative for pain and redness.   Respiratory: Negative for cough and shortness of breath.    Cardiovascular: Negative for chest pain and palpitations.   Gastrointestinal: Positive for diarrhea and nausea. Negative for abdominal pain, blood in stool, constipation and vomiting.   Genitourinary: Positive for vaginal discharge. Negative for dysuria, flank pain, frequency, hematuria and urgency.   Musculoskeletal: Negative for back pain.   Skin: Negative for rash.    Neurological: Positive for headaches. Negative for dizziness and weakness.   Hematological: Negative for adenopathy. Does not bruise/bleed easily.   Psychiatric/Behavioral: Negative for confusion and sleep disturbance.       Objective    Vitals:    09/12/18 1341   BP: 122/80   Pulse: 86   SpO2: 98%     Body mass index is 24.37 kg/m².  1    09/12/18  1341   Weight: 74.8 kg (165 lb)         Physical Exam   Constitutional: She is oriented to person, place, and time. She appears well-developed and well-nourished. She is cooperative. She does not appear ill. No distress.   HENT:   Mouth/Throat: Mucous membranes are normal. Mucous membranes are not dry. No oral lesions.   Genitourinary: Pelvic exam was performed with patient supine. There is no rash, tenderness, lesion or injury on the right labia. There is no rash, tenderness, lesion or injury on the left labia. Uterus is not enlarged and not tender. Cervix exhibits discharge (as above). Cervix exhibits no motion tenderness and no friability. Right adnexum displays no mass and no tenderness. Left adnexum displays no mass and no tenderness. No tenderness in the vagina. Vaginal discharge (thick, mucoid with grey/brown color, non-malodorous) found.   Genitourinary Comments: IUD string in place     Vascular Status -  Her right foot exhibits no edema. Her left foot exhibits no edema.  Neurological: She is alert and oriented to person, place, and time. Gait normal.   Skin: Skin is warm and dry. No rash noted.   Psychiatric: She has a normal mood and affect. Her behavior is normal. Cognition and memory are normal.   Nursing note and vitals reviewed.      Assessment/Plan   Leigh was seen today for vaginal discharge.    Diagnoses and all orders for this visit:    Acute vaginitis    Vaginal discharge    Other orders  -     valACYclovir (VALTREX) 500 MG tablet; Take 1 tablet by mouth Daily.  -     metroNIDAZOLE (FLAGYL) 500 MG tablet; Take 1 tablet by mouth 2 (Two) Times a Day  for 7 days.    Valtrex refilled for use when necessary use.    I have provided empiric therapy for BV as above.  Further testing pending including detailed BV panel, vaginitis panel. Preventive measures reviewed.    I will contact patient regarding test results and provide instructions regarding any necessary changes in plan of care.  Patient was encouraged to keep me informed of any acute changes, lack of improvement, or any new concerning symptoms.  Pt is aware of reasons to seek emergent care.  Patient voiced understanding of all instructions and denied further questions.        Please note that portions of this note may have been completed with a voice recognition program. Efforts were made to edit the dictations, but occasionally words are mistranscribed.

## 2018-11-07 ENCOUNTER — OFFICE VISIT (OUTPATIENT)
Dept: NEUROLOGY | Facility: CLINIC | Age: 38
End: 2018-11-07

## 2018-11-07 VITALS
HEART RATE: 80 BPM | SYSTOLIC BLOOD PRESSURE: 112 MMHG | OXYGEN SATURATION: 98 % | DIASTOLIC BLOOD PRESSURE: 72 MMHG | BODY MASS INDEX: 24.44 KG/M2 | HEIGHT: 69 IN | WEIGHT: 165 LBS

## 2018-11-07 DIAGNOSIS — M54.2 CERVICALGIA: Primary | ICD-10-CM

## 2018-11-07 DIAGNOSIS — G43.019 INTRACTABLE MIGRAINE WITHOUT AURA AND WITHOUT STATUS MIGRAINOSUS: ICD-10-CM

## 2018-11-07 PROCEDURE — 99203 OFFICE O/P NEW LOW 30 MIN: CPT | Performed by: NURSE PRACTITIONER

## 2018-11-07 NOTE — PROGRESS NOTES
Subjective   Leigh Negron is a 38 y.o. female     Chief Complaint   Patient presents with   • Back Pain     NP/Pt is here for upper back pain. 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 Pt states that she has had trigger injections in the past and has benefited greatly from them        Patient is very pleasant 38-year-old female very active and doing workouts 3-6 days weeklystates she is upperback and trap pain it does create headaches running up from her trap she history of trigger point injections which didn't work in the past.  Patient also follows withchiropractor and Motrin and massages which actually help but don't hold him more than 2 days after massage or chiropractic services.    Back Pain   This is a chronic problem. The current episode started more than 1 year ago. The problem occurs constantly. The problem has been gradually worsening since onset. The pain is present in the thoracic spine. The quality of the pain is described as aching. The pain is at a severity of 6/10. The pain is the same all the time. The symptoms are aggravated by sitting and stress. Stiffness is present all day. Associated symptoms include headaches and weakness. Pertinent negatives include no abdominal pain, bladder incontinence, bowel incontinence, chest pain, dysuria, fever, leg pain, numbness, paresis, paresthesias, pelvic pain, perianal numbness, tingling or weight loss.       The following portions of the patient's history were reviewed and updated as appropriate: allergies, current medications, past family history, past medical history, past social history, past surgical history and problem list.    Review of Systems   Constitutional: Negative for fever and weight loss.   Cardiovascular: Negative for chest pain.   Gastrointestinal: Negative for abdominal pain and bowel incontinence.   Genitourinary: Negative for bladder incontinence, dysuria and pelvic pain.  "  Musculoskeletal: Positive for back pain.   Neurological: Positive for weakness and headaches. Negative for tingling, numbness and paresthesias.       Objective       Vitals:    11/07/18 0907   BP: 112/72   Pulse: 80   SpO2: 98%   Weight: 74.8 kg (165 lb)   Height: 175.3 cm (69\")     GENERAL: Patient is pleasant, cooperative, appears to be stated age.  Body habitus is endomorphic.  HEAD:  Head is normocephalic and atraumatic.    NECK: Neck are non-tender without thyromegaly or adenopathy.  Carotic upstrokes are 1+/4.  No cranial or cervical bruits.  The neck is supple with a full range of motion.   CARDIOVASCULAR:  Regular rate and rhythm with normal S1 and S2 without rub or gallop.  RESPIRATORY:  Clear to auscultation without wheezes or crackle   PSYCH: The patient is alert.  She  is oriented x3 to time, place and person.  Recent and the remote memory appear normal.  The patient has a good fund of knowledge.  There is no visual or auditory hallucination or suicidal or homicidal ideation.  SPEECH:There is no gross evidence of aphasia, dysarthria or agnosia.      CRANIAL NERVES: Extraocular movements are full and smooth with normal pursuits and saccades.  No nystagmus noted.  The face is symmetric. Tongue midline.   MOTOR: Strength is 5/5 throughout with normal tone and bulk  No involuntary movements noted.    DTR: are 2/4 and symmetrical in the upper extremities, 2/4 and symmetrical at the knees   COORDINATION AND GAIT:  The patient walks with a narrow-based gait.  Romberg are negative.    Results for orders placed or performed in visit on 06/06/18   Hepatitis C Antibody   Result Value Ref Range    Hep C Virus Ab <0.1 0.0 - 0.9 s/co ratio   HIV-1 / O / 2 Ag / Antibody 4th Generation   Result Value Ref Range    HIV Screen 4th Gen w/RFX (Reference) Non Reactive Non Reactive   HSV 1 & 2 - Specific Antibody, IgG   Result Value Ref Range    HSV 1 IgG, Type Specific <0.91 0.00 - 0.90 index    HSV 2 IgG 9.03 (H) 0.00 - " 0.90 index   RPR   Result Value Ref Range    RPR Non Reactive Non Reactive       I have personally reviewed the above labs. Pt follows with PCP.    Assessment/Plan   1.  Muscle spasms associated with occipital headaches: Patient has had trigger points in the past which have helped patient will be scheduled in 2-4 weeks and doing insurance.  Patient to continue with chiropractic and massage.

## 2018-11-20 PROBLEM — M54.2 CERVICALGIA: Status: ACTIVE | Noted: 2018-11-20

## 2018-11-20 PROBLEM — G43.019 INTRACTABLE MIGRAINE WITHOUT AURA AND WITHOUT STATUS MIGRAINOSUS: Status: ACTIVE | Noted: 2018-11-20

## 2018-11-29 ENCOUNTER — PROCEDURE VISIT (OUTPATIENT)
Dept: NEUROLOGY | Facility: CLINIC | Age: 38
End: 2018-11-29

## 2018-11-29 VITALS — HEART RATE: 78 BPM | WEIGHT: 165 LBS | OXYGEN SATURATION: 98 % | BODY MASS INDEX: 24.44 KG/M2 | HEIGHT: 69 IN

## 2018-11-29 DIAGNOSIS — M54.2 CERVICALGIA: Primary | ICD-10-CM

## 2018-11-29 DIAGNOSIS — G43.119 INTRACTABLE MIGRAINE WITH AURA WITHOUT STATUS MIGRAINOSUS: ICD-10-CM

## 2018-11-29 DIAGNOSIS — G43.019 INTRACTABLE MIGRAINE WITHOUT AURA AND WITHOUT STATUS MIGRAINOSUS: ICD-10-CM

## 2018-11-29 PROCEDURE — 20553 NJX 1/MLT TRIGGER POINTS 3/>: CPT | Performed by: NURSE PRACTITIONER

## 2018-11-29 RX ORDER — BUPIVACAINE HYDROCHLORIDE 2.5 MG/ML
5 INJECTION, SOLUTION INFILTRATION; PERINEURAL ONCE
Status: COMPLETED | OUTPATIENT
Start: 2018-11-29 | End: 2018-12-12

## 2018-11-29 RX ORDER — METHYLPREDNISOLONE ACETATE 40 MG/ML
200 INJECTION, SUSPENSION INTRA-ARTICULAR; INTRALESIONAL; INTRAMUSCULAR; SOFT TISSUE ONCE
Status: COMPLETED | OUTPATIENT
Start: 2018-11-29 | End: 2018-12-12

## 2018-12-12 RX ADMIN — METHYLPREDNISOLONE ACETATE 200 MG: 40 INJECTION, SUSPENSION INTRA-ARTICULAR; INTRALESIONAL; INTRAMUSCULAR; SOFT TISSUE at 20:00

## 2018-12-12 RX ADMIN — BUPIVACAINE HYDROCHLORIDE 5 ML: 2.5 INJECTION, SOLUTION INFILTRATION; PERINEURAL at 20:00

## 2019-02-28 ENCOUNTER — PROCEDURE VISIT (OUTPATIENT)
Dept: NEUROLOGY | Facility: CLINIC | Age: 39
End: 2019-02-28

## 2019-02-28 VITALS
SYSTOLIC BLOOD PRESSURE: 128 MMHG | DIASTOLIC BLOOD PRESSURE: 78 MMHG | HEIGHT: 69 IN | WEIGHT: 165 LBS | BODY MASS INDEX: 24.44 KG/M2 | OXYGEN SATURATION: 99 % | HEART RATE: 66 BPM

## 2019-02-28 DIAGNOSIS — G43.019 INTRACTABLE MIGRAINE WITHOUT AURA AND WITHOUT STATUS MIGRAINOSUS: Primary | ICD-10-CM

## 2019-02-28 DIAGNOSIS — G43.119 INTRACTABLE MIGRAINE WITH AURA WITHOUT STATUS MIGRAINOSUS: ICD-10-CM

## 2019-02-28 DIAGNOSIS — M54.2 CERVICALGIA: ICD-10-CM

## 2019-02-28 PROCEDURE — 20553 NJX 1/MLT TRIGGER POINTS 3/>: CPT | Performed by: NURSE PRACTITIONER

## 2019-02-28 RX ORDER — BUPIVACAINE HYDROCHLORIDE 5 MG/ML
5 INJECTION, SOLUTION PERINEURAL ONCE
Status: COMPLETED | OUTPATIENT
Start: 2019-02-28 | End: 2019-02-28

## 2019-02-28 RX ORDER — CEFDINIR 300 MG/1
CAPSULE ORAL
COMMUNITY
Start: 2019-02-01 | End: 2019-07-30

## 2019-02-28 RX ORDER — METHYLPREDNISOLONE ACETATE 40 MG/ML
200 INJECTION, SUSPENSION INTRA-ARTICULAR; INTRALESIONAL; INTRAMUSCULAR; SOFT TISSUE ONCE
Status: COMPLETED | OUTPATIENT
Start: 2019-02-28 | End: 2019-02-28

## 2019-02-28 RX ORDER — FLUTICASONE PROPIONATE 50 MCG
SPRAY, SUSPENSION (ML) NASAL
COMMUNITY
Start: 2019-01-23

## 2019-02-28 RX ADMIN — METHYLPREDNISOLONE ACETATE 200 MG: 40 INJECTION, SUSPENSION INTRA-ARTICULAR; INTRALESIONAL; INTRAMUSCULAR; SOFT TISSUE at 10:07

## 2019-02-28 RX ADMIN — BUPIVACAINE HYDROCHLORIDE 5 ML: 5 INJECTION, SOLUTION PERINEURAL at 10:06

## 2019-02-28 NOTE — PROGRESS NOTES
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  Patient returns to clinic after 3 months to have repeat trigger point injections.  She states the trigger point injections have helped her neck pain she also states that it decreased her headaches by more than 50% patient does also use massage and, chiropractic services.  Risks and benefits of the Trigger point injection procedure have been explained to the patient.  Patient has no contraindications such as bleed diathesis, recent acute trauma at the muscle sites, anesthetic allergy or anticoagulation.  Mechanism of trigger point injection has been explained to patient.     Procedure Note:  1.         Patient was positioned comfortably  2.         Before injections are started, 10 Trigger Points sites are identified throughout the bilateral upper trapezius muscle, levator scapulae, and erector spinae muscles.    3.         Overlying skin area was cleaned with Alcohol swab                                                                                                              4.         Before injection, trigger points sites were palpated for local twitch and referred pain to confirms placement                         5.         Local anesthetic was mixed with Depo-Medrol (5 cc of Marcaine 0.5% mixed with 5 cc of Depo-Medrol at 40 mg/ml - for a total of 10 cc)  6.         30 gauge ½ inch needle was utilized to ensure patient comfort          7.         I started with the most tender spot in above mentioned Trigger Points   1.   Localize most tender spot within taut muscle-fibers  2.   Fix tender spot between fingers (1-2 cm in size)   1.   Prevents from rolling away from needle  2.   Controls subcutaneous bleeding  3.   Before injection, patient was instructed of possible pain on injection  4.   Direct needle at 30 degree angle off skin   8.         Insert needle into skin 1-2 cm from Trigger Point   9.         Advance needle into Trigger Point    10.       Use 1cc anesthetic at each Trigger Points identified in step #2  11.       Redirect needle and reinject                                                                                              1.   Withdraw needle to subcutaneous tissue  2.   Redirect needle into adjacent tender areas  3.   Repeat until local twitch or tautness resolves  12.   After each of the 10 injections I held direct pressure at injection site for 2 minutes to prevents hematoma formation  13.   The same procedure was repeated for other Tender Points located in the upper trapezius muscle, levator scapulae and erector spinae bilaterally  14.   Patient was instructed to gently stretch injected areas to full active range of motion in all directions and was instructed to repeat range of motion three times after injection  15.   Post-Procedure patient was instructed to avoid over-using injected area for 3-4 days   1.   Maintain active range of motion of injected muscle  2.   Patient applies ice to injected areas for a few hours  3.   Anticipate post-injection soreness for 3-4 days  There was no evidence of complications from injection was noted such as local Skin Infection  at injection site or local hematoma at injection site

## 2019-07-30 ENCOUNTER — TELEPHONE (OUTPATIENT)
Dept: FAMILY MEDICINE CLINIC | Facility: CLINIC | Age: 39
End: 2019-07-30

## 2019-07-30 ENCOUNTER — OFFICE VISIT (OUTPATIENT)
Dept: FAMILY MEDICINE CLINIC | Facility: CLINIC | Age: 39
End: 2019-07-30

## 2019-07-30 VITALS
DIASTOLIC BLOOD PRESSURE: 82 MMHG | OXYGEN SATURATION: 99 % | HEART RATE: 70 BPM | WEIGHT: 187 LBS | BODY MASS INDEX: 27.62 KG/M2 | SYSTOLIC BLOOD PRESSURE: 128 MMHG

## 2019-07-30 DIAGNOSIS — Z11.4 SCREENING FOR HIV (HUMAN IMMUNODEFICIENCY VIRUS): ICD-10-CM

## 2019-07-30 DIAGNOSIS — N89.8 VAGINAL DISCHARGE: ICD-10-CM

## 2019-07-30 DIAGNOSIS — E34.9 HORMONAL DISORDER: ICD-10-CM

## 2019-07-30 DIAGNOSIS — F90.9 ADULT ADHD: ICD-10-CM

## 2019-07-30 DIAGNOSIS — Z00.00 WELL WOMAN EXAM WITHOUT GYNECOLOGICAL EXAM: Primary | ICD-10-CM

## 2019-07-30 DIAGNOSIS — Z11.59 NEED FOR HEPATITIS C SCREENING TEST: ICD-10-CM

## 2019-07-30 DIAGNOSIS — Z20.2 POSSIBLE EXPOSURE TO STD: ICD-10-CM

## 2019-07-30 DIAGNOSIS — Z13.1 SCREENING FOR DIABETES MELLITUS: ICD-10-CM

## 2019-07-30 DIAGNOSIS — Z51.81 MEDICATION MONITORING ENCOUNTER: ICD-10-CM

## 2019-07-30 DIAGNOSIS — R53.82 CHRONIC FATIGUE: ICD-10-CM

## 2019-07-30 DIAGNOSIS — N89.8 VAGINAL ODOR: ICD-10-CM

## 2019-07-30 DIAGNOSIS — Z13.220 SCREENING FOR LIPID DISORDERS: ICD-10-CM

## 2019-07-30 PROCEDURE — 99395 PREV VISIT EST AGE 18-39: CPT | Performed by: FAMILY MEDICINE

## 2019-07-30 RX ORDER — VALACYCLOVIR HYDROCHLORIDE 500 MG/1
500 TABLET, FILM COATED ORAL DAILY
Qty: 90 TABLET | Refills: 1 | Status: SHIPPED | OUTPATIENT
Start: 2019-07-30 | End: 2019-07-30 | Stop reason: SDUPTHER

## 2019-07-30 RX ORDER — VALACYCLOVIR HYDROCHLORIDE 500 MG/1
500 TABLET, FILM COATED ORAL DAILY
Qty: 90 TABLET | Refills: 1 | Status: SHIPPED | OUTPATIENT
Start: 2019-07-30 | End: 2020-08-12

## 2019-07-30 RX ORDER — METRONIDAZOLE 500 MG/1
500 TABLET ORAL 2 TIMES DAILY
Qty: 14 TABLET | Refills: 0 | Status: SHIPPED | OUTPATIENT
Start: 2019-07-30 | End: 2019-08-06

## 2019-07-30 NOTE — PATIENT INSTRUCTIONS
Preventive Care 18-39 Years, Female  Preventive care refers to lifestyle choices and visits with your health care provider that can promote health and wellness.  What does preventive care include?  · A yearly physical exam. This is also called an annual well check.  · Dental exams once or twice a year.  · Routine eye exams. Ask your health care provider how often you should have your eyes checked.  · Personal lifestyle choices, including:  ? Daily care of your teeth and gums.  ? Regular physical activity.  ? Eating a healthy diet.  ? Avoiding tobacco and drug use.  ? Limiting alcohol use.  ? Practicing safe sex.  ? Taking vitamin and mineral supplements as recommended by your health care provider.  What happens during an annual well check?  The services and screenings done by your health care provider during your annual well check will depend on your age, overall health, lifestyle risk factors, and family history of disease.  Counseling  Your health care provider may ask you questions about your:  · Alcohol use.  · Tobacco use.  · Drug use.  · Emotional well-being.  · Home and relationship well-being.  · Sexual activity.  · Eating habits.  · Work and work environment.  · Method of birth control.  · Menstrual cycle.  · Pregnancy history.    Screening  You may have the following tests or measurements:  · Height, weight, and BMI.  · Diabetes screening. This is done by checking your blood sugar (glucose) after you have not eaten for a while (fasting).  · Blood pressure.  · Lipid and cholesterol levels. These may be checked every 5 years starting at age 20.  · Skin check.  · Hepatitis C blood test.  · Hepatitis B blood test.  · Sexually transmitted disease (STD) testing.  · BRCA-related cancer screening. This may be done if you have a family history of breast, ovarian, tubal, or peritoneal cancers.  · Pelvic exam and Pap test. This may be done every 3 years starting at age 21. Starting at age 30, this may be done every 5  years if you have a Pap test in combination with an HPV test.    Discuss your test results, treatment options, and if necessary, the need for more tests with your health care provider.  Vaccines  Your health care provider may recommend certain vaccines, such as:  · Influenza vaccine. This is recommended every year.  · Tetanus, diphtheria, and acellular pertussis (Tdap, Td) vaccine. You may need a Td booster every 10 years.  · Varicella vaccine. You may need this if you have not been vaccinated.  · HPV vaccine. If you are 26 or younger, you may need three doses over 6 months.  · Measles, mumps, and rubella (MMR) vaccine. You may need at least one dose of MMR. You may also need a second dose.  · Pneumococcal 13-valent conjugate (PCV13) vaccine. You may need this if you have certain conditions and were not previously vaccinated.  · Pneumococcal polysaccharide (PPSV23) vaccine. You may need one or two doses if you smoke cigarettes or if you have certain conditions.  · Meningococcal vaccine. One dose is recommended if you are age 19-21 years and a first-year college student living in a residence [place], or if you have one of several medical conditions. You may also need additional booster doses.  · Hepatitis A vaccine. You may need this if you have certain conditions or if you travel or work in places where you may be exposed to hepatitis A.  · Hepatitis B vaccine. You may need this if you have certain conditions or if you travel or work in places where you may be exposed to hepatitis B.  · Haemophilus influenzae type b (Hib) vaccine. You may need this if you have certain risk factors.    Talk to your health care provider about which screenings and vaccines you need and how often you need them.  This information is not intended to replace advice given to you by your health care provider. Make sure you discuss any questions you have with your health care provider.  Document Released: 02/13/2003 Document Revised: 07/31/2018  Document Reviewed: 10/18/2016  [employer] Interactive Patient Education © 2019 [employer]

## 2019-07-30 NOTE — PROGRESS NOTES
"Subjective   Leigh Negron is a 39 y.o. female.     History of Present Illness  Ms. Jara presents today for routine annual exam.  She is a Para 1. Is sexually active. Has h/o high risk sexual behavior. IUD for contraception.  Has h/o HR HPV s/p colposcopy.  She has not required LEEP/conization/cryo, etc.  She has remained UTD with pap smears. Last pap 2018 normal with negative HR HPV. She is a former smoker.  Only occasional EtOH use.  No illicit drug use.  Generally healthy diet. Less frequent/regular exercise from previous. She c/o chronic fatigue, weight gain.  Denies anxiety/depressive symptoms.  Has had HIV and Hep screening, last done in 1/2016.  Is immune to Hep B.  Would like to have repeat screening.  Has h/o herpes simplex and is on suppressive tx with Valtrex. Needs refill. Good control of outbreaks. Has some increased vaginal d/c and vaginal odor which is new. No pelvic pain. Recent new partner of unknown STD hx.    She is UTD on dental exam.    IUD paraguard for contraception.    She is followed by psychiatry for ADHD.  Currently on Vyvanse and  short acting Adderall.  No recent changes.  She denies medication side effects.  Blood pressure has been controlled.    In addition over the last few months she has been on testosterone supplementation.  Was seen at a \"hormone clinic\", found to have what sounds like low normal testosterone levels.  Was placed on \"pellets\" but has questions about hormonal levels as she is \"not feeling right\".  She is also very displeased with excessive hair growth with testosterone supplementation.  Pt's previous HPI reviewed and updated as indicated.       The following portions of the patient's history were reviewed and updated as appropriate: allergies, current medications, past family history, past medical history, past social history, past surgical history and problem list.    Review of Systems   Constitutional: Positive for unexpected weight change. Negative for " fatigue and fever.   HENT: Negative for congestion, mouth sores, rhinorrhea, sore throat and trouble swallowing.    Eyes: Negative for visual disturbance.   Respiratory: Negative for cough, shortness of breath and wheezing.    Cardiovascular: Negative for chest pain, palpitations and leg swelling.   Gastrointestinal: Negative for abdominal pain, blood in stool, constipation, diarrhea and vomiting.   Endocrine: Negative for polydipsia and polyuria.   Genitourinary: Positive for vaginal discharge. Negative for dyspareunia, dysuria, frequency, genital sores, hematuria, pelvic pain and urgency.   Musculoskeletal: Positive for arthralgias and myalgias. Negative for back pain.   Skin: Positive for rash (recently tx'd for poison ivy). Negative for wound.   Neurological: Positive for headaches. Negative for dizziness, tremors, syncope and weakness.   Hematological: Negative for adenopathy. Does not bruise/bleed easily.   Psychiatric/Behavioral: Negative for confusion, dysphoric mood and sleep disturbance. The patient is not nervous/anxious.    Pt's previous ROS reviewed and updated as indicated.       Objective    Vitals:    07/30/19 0820   BP: 128/82   Pulse: 70   SpO2: 99%     Body mass index is 27.62 kg/m².      07/30/19  0820   Weight: 84.8 kg (187 lb)       Physical Exam   Constitutional: She is oriented to person, place, and time. She appears well-developed and well-nourished. She is cooperative. She does not appear ill. No distress.   HENT:   Head: Normocephalic and atraumatic.   Right Ear: Tympanic membrane, external ear and ear canal normal.   Left Ear: Tympanic membrane, external ear and ear canal normal.   Nose: Nose normal. No mucosal edema or rhinorrhea.   Mouth/Throat: Oropharynx is clear and moist and mucous membranes are normal. No oral lesions. Normal dentition. No posterior oropharyngeal erythema.   Eyes: Conjunctivae, EOM and lids are normal.   Neck: Phonation normal. Neck supple. Normal carotid pulses  present. No thyromegaly present.   Cardiovascular: Normal rate, regular rhythm, S1 normal, S2 normal and intact distal pulses. Exam reveals no gallop.   No murmur heard.  Pulmonary/Chest: Effort normal and breath sounds normal.   Abdominal: Soft. Bowel sounds are normal. She exhibits no distension and no mass. There is no hepatosplenomegaly. There is no tenderness.   Musculoskeletal: Normal range of motion. She exhibits no edema, tenderness or deformity.     Vascular Status -  Her right foot exhibits no edema. Her left foot exhibits no edema.  Lymphadenopathy:     She has no cervical adenopathy.   Neurological: She is alert and oriented to person, place, and time. She has normal strength. She displays no tremor. No cranial nerve deficit or sensory deficit. Gait normal.   Reflex Scores:       Tricep reflexes are 1+ on the right side and 1+ on the left side.       Bicep reflexes are 1+ on the right side and 1+ on the left side.       Brachioradialis reflexes are 1+ on the right side and 1+ on the left side.       Patellar reflexes are 2+ on the right side and 2+ on the left side.  Skin: Skin is warm and dry. Rash (resolving contact dermatitis arms/legs) noted. No ecchymosis noted. No cyanosis. Nails show no clubbing.   Psychiatric: She has a normal mood and affect. Her speech is normal and behavior is normal. Judgment and thought content normal. Cognition and memory are normal.   Good eye contact. Answers questions appropriately. Good personal hygiene and grooming.   Nursing note and vitals reviewed.  Pt's previous physical exam reviewed and updated as indicated.    Assessment/Plan   Leigh was seen today for annual exam.    Diagnoses and all orders for this visit:    Well woman exam without gynecological exam    Vaginal odor  -     Chlamydia trachomatis, Neisseria gonorrhoeae, PCR - Urine, Urine, Clean Catch    Vaginal discharge  -     Chlamydia trachomatis, Neisseria gonorrhoeae, PCR - Urine, Urine, Clean  Catch    Screening for lipid disorders  -     Lipid Panel    Screening for diabetes mellitus  -     Hemoglobin A1c    Hormonal disorder  -     Testosterone, Free, Total  -     Estradiol  -     Progesterone    Medication monitoring encounter  -     CBC (No Diff)  -     Comprehensive Metabolic Panel    Adult ADHD  -     CBC (No Diff)  -     Comprehensive Metabolic Panel    Chronic fatigue  -     TSH Rfx On Abnormal To Free T4  -     Vitamin B12    Screening for HIV (human immunodeficiency virus)  -     HIV-1 / O / 2 Ag / Antibody 4th Generation    Need for hepatitis C screening test  -     Hepatitis C Antibody    Possible exposure to STD  -     Hepatitis C Antibody  -     HIV-1 / O / 2 Ag / Antibody 4th Generation  -     RPR    Other orders  -     Discontinue: valACYclovir (VALTREX) 500 MG tablet; Take 1 tablet by mouth Daily.       F/u with psychiatry for mgnt of ADHD. Surveillance labs as above.    Age appropriate preventive care reviewed including cancer screenings, safety measures, mental health concerns, supplements, prevention of CV disease and DM, etc. Handout provided.    Valtrex refilled for mgnt of herpes simplex.    Empiric tx for BV with flagyl x 1 week while awaiting test results.    Safe sex practices encouraged.    F/u for annual exam, sooner as needed/instructed.  I will contact patient regarding test results and provide instructions regarding any necessary changes in plan of care.  Patient was encouraged to keep me informed of any acute changes, lack of improvement, or any new concerning symptoms.  Pt is aware of reasons to seek emergent care.  Patient voiced understanding of all instructions and denied further questions.

## 2019-07-31 LAB
ALBUMIN SERPL-MCNC: 4.2 G/DL (ref 3.5–5)
ALBUMIN/GLOB SERPL: 1.6 G/DL (ref 1–2)
ALP SERPL-CCNC: 44 U/L (ref 38–126)
ALT SERPL-CCNC: 19 U/L (ref 13–69)
AST SERPL-CCNC: 26 U/L (ref 15–46)
BILIRUB SERPL-MCNC: 0.5 MG/DL (ref 0.2–1.3)
BUN SERPL-MCNC: 13 MG/DL (ref 7–20)
BUN/CREAT SERPL: 21.7 (ref 7.1–23.5)
C TRACH RRNA SPEC QL NAA+PROBE: NEGATIVE
CALCIUM SERPL-MCNC: 9.1 MG/DL (ref 8.4–10.2)
CHLORIDE SERPL-SCNC: 101 MMOL/L (ref 98–107)
CHOLEST SERPL-MCNC: 178 MG/DL (ref 0–199)
CO2 SERPL-SCNC: 26 MMOL/L (ref 26–30)
CREAT SERPL-MCNC: 0.6 MG/DL (ref 0.6–1.3)
ERYTHROCYTE [DISTWIDTH] IN BLOOD BY AUTOMATED COUNT: 13 % (ref 12.3–15.4)
ESTRADIOL SERPL-MCNC: 69.9 PG/ML
GLOBULIN SER CALC-MCNC: 2.6 GM/DL
GLUCOSE SERPL-MCNC: 81 MG/DL (ref 74–98)
HBA1C MFR BLD: 5.3 % (ref 4.8–5.6)
HCT VFR BLD AUTO: 44.2 % (ref 34–46.6)
HCV AB S/CO SERPL IA: <0.1 S/CO RATIO (ref 0–0.9)
HDLC SERPL-MCNC: 56 MG/DL (ref 40–60)
HGB BLD-MCNC: 14 G/DL (ref 12–15.9)
HIV 1+2 AB+HIV1 P24 AG SERPL QL IA: NON REACTIVE
LDLC SERPL CALC-MCNC: 105 MG/DL (ref 0–99)
MCH RBC QN AUTO: 29.2 PG (ref 26.6–33)
MCHC RBC AUTO-ENTMCNC: 31.7 G/DL (ref 31.5–35.7)
MCV RBC AUTO: 92.1 FL (ref 79–97)
N GONORRHOEA RRNA SPEC QL NAA+PROBE: NEGATIVE
PLATELET # BLD AUTO: 260 10*3/MM3 (ref 140–450)
POTASSIUM SERPL-SCNC: 4.3 MMOL/L (ref 3.5–5.1)
PROGEST SERPL-MCNC: 3.6 NG/ML
PROT SERPL-MCNC: 6.8 G/DL (ref 6.3–8.2)
RBC # BLD AUTO: 4.8 10*6/MM3 (ref 3.77–5.28)
RPR SER QL: NON REACTIVE
SODIUM SERPL-SCNC: 137 MMOL/L (ref 137–145)
TESTOST FREE SERPL-MCNC: 1.9 PG/ML (ref 0–4.2)
TESTOST SERPL-MCNC: 27 NG/DL (ref 8–48)
TRIGL SERPL-MCNC: 87 MG/DL
TSH SERPL DL<=0.005 MIU/L-ACNC: 1.38 MIU/ML (ref 0.47–4.68)
VIT B12 SERPL-MCNC: 725 PG/ML (ref 239–931)
VLDLC SERPL CALC-MCNC: 17.4 MG/DL
WBC # BLD AUTO: 4.12 10*3/MM3 (ref 3.4–10.8)

## 2019-08-26 ENCOUNTER — OFFICE VISIT (OUTPATIENT)
Dept: FAMILY MEDICINE CLINIC | Facility: CLINIC | Age: 39
End: 2019-08-26

## 2019-08-26 VITALS
TEMPERATURE: 99 F | WEIGHT: 189.5 LBS | SYSTOLIC BLOOD PRESSURE: 120 MMHG | DIASTOLIC BLOOD PRESSURE: 80 MMHG | HEART RATE: 87 BPM | BODY MASS INDEX: 27.98 KG/M2 | OXYGEN SATURATION: 97 %

## 2019-08-26 DIAGNOSIS — J06.9 ACUTE URI: Primary | ICD-10-CM

## 2019-08-26 DIAGNOSIS — N89.8 VAGINAL IRRITATION: ICD-10-CM

## 2019-08-26 PROCEDURE — 99213 OFFICE O/P EST LOW 20 MIN: CPT | Performed by: NURSE PRACTITIONER

## 2019-08-26 NOTE — PROGRESS NOTES
Subjective     Chief Complaint:    Chief Complaint   Patient presents with   • Sore Throat     Pt states she's had a sore throat for two days.    • Vaginal Pain     Pt states she had sex 9 days ago and thought her labial was tore. Pt states now it's itiching, buring with red bumps.       History of Present Illness:   Symptoms started yesterday. + sore throat. + body aches. + chills. Feeling some better today. Bilateral ear pain, right worse than left. No runny nose. No congestion. + PND. No cough. Has not taken anything for her symptoms.     Had unproteceted sex with new partner last weekend. Started having itching and pain on the left side of her labia. Has hx of herpes but normally right sided and this feels differenr. Thin discharge. + odor. + suprapubic pain. No dysuria.     Review of Systems   Constitutional: Negative for chills and fever.   Respiratory: Negative for cough and shortness of breath.    Cardiovascular: Negative for chest pain and palpitations.   Gastrointestinal: Negative for abdominal pain and nausea.   Neurological: Negative for dizziness and headache.        I have reviewed and/or updated the patient's past medical, surgical, family, social history and problem list as appropriate.     Medications:    Current Outpatient Medications:   •  amphetamine-dextroamphetamine (ADDERALL) 10 MG tablet, Take  by mouth daily., Disp: , Rfl:   •  escitalopram (LEXAPRO) 5 MG tablet, escitalopram 5 mg tablet, Disp: , Rfl:   •  fluticasone (FLONASE) 50 MCG/ACT nasal spray, , Disp: , Rfl:   •  lisdexamfetamine (VYVANSE) 50 MG capsule, Take  by mouth daily., Disp: , Rfl:   •  valACYclovir (VALTREX) 500 MG tablet, Take 1 tablet by mouth Daily., Disp: 90 tablet, Rfl: 1    Allergies:  Allergies   Allergen Reactions   • Augmentin [Amoxicillin-Pot Clavulanate]    • Bactrim [Sulfamethoxazole-Trimethoprim]        Objective     Vital Signs:   Vitals:    08/26/19 1338   BP: 120/80   Pulse: 87   Temp: 99 °F (37.2 °C)    SpO2: 97%   Weight: 86 kg (189 lb 8 oz)       Physical Exam:    Physical Exam   Constitutional: She is oriented to person, place, and time. She appears well-developed and well-nourished.   HENT:   Head: Normocephalic and atraumatic.   Right Ear: Tympanic membrane, external ear and ear canal normal.   Left Ear: Tympanic membrane, external ear and ear canal normal.   Nose: Nose normal.   Mouth/Throat: Uvula is midline, oropharynx is clear and moist and mucous membranes are normal.   Cardiovascular: Normal rate, regular rhythm and intact distal pulses. Exam reveals no gallop and no friction rub.   No murmur heard.  Pulmonary/Chest: Effort normal and breath sounds normal.   Abdominal: Soft. Bowel sounds are normal. There is no tenderness.   Genitourinary:   Genitourinary Comments: Outside genital normal  The space between labia majora and minora is erythematous. No lesion noted. Speculum exam reveals thin greyish discharge   Lymphadenopathy:        Head (right side): No submental, no submandibular, no tonsillar, no preauricular and no posterior auricular adenopathy present.        Head (left side): No submental, no submandibular, no tonsillar, no preauricular and no posterior auricular adenopathy present.     She has no cervical adenopathy.   Neurological: She is alert and oriented to person, place, and time.   Skin: Skin is warm and dry.   Psychiatric: She has a normal mood and affect. Her behavior is normal.       Assessment / Plan     Assessment/Plan:   Problem List Items Addressed This Visit     None      Visit Diagnoses     Acute URI    -  Primary    Vaginal irritation        Relevant Orders    Nuab Vaginitis (VG) - Swab, Vagina          URI  - symptoms consistent with viral illness  - supportive care discussed, otc meds discussed, saline nasal spray, gargle with warm salt water, humidifier  - RTC if no improvement    Vaginal Irritation  - discussed likely could be vaginitis, discussed using hydrocortisone  externally a few times a day  - Nuswab ordered. Will follow and treat according    Follow up:  Return if symptoms worsen or fail to improve.    Electronically signed by STEFFANIE Harman   08/26/2019 1:50 PM      Please note that portions of this note may have been completed with a voice recognition program. Efforts were made to edit the dictations, but occasionally words are mistranscribed.

## 2019-08-29 LAB
A VAGINAE DNA VAG QL NAA+PROBE: ABNORMAL SCORE
BVAB2 DNA VAG QL NAA+PROBE: ABNORMAL SCORE
C ALBICANS DNA VAG QL NAA+PROBE: POSITIVE
C GLABRATA DNA VAG QL NAA+PROBE: NEGATIVE
MEGA1 DNA VAG QL NAA+PROBE: ABNORMAL SCORE
T VAGINALIS RRNA SPEC QL NAA+PROBE: NEGATIVE

## 2019-08-30 RX ORDER — FLUCONAZOLE 150 MG/1
TABLET ORAL
Qty: 2 TABLET | Refills: 0 | Status: SHIPPED | OUTPATIENT
Start: 2019-08-30 | End: 2019-08-30

## 2019-08-30 RX ORDER — FLUCONAZOLE 150 MG/1
TABLET ORAL
Qty: 2 TABLET | Refills: 0 | Status: SHIPPED | OUTPATIENT
Start: 2019-08-30 | End: 2019-10-02

## 2019-09-04 ENCOUNTER — HOSPITAL ENCOUNTER (EMERGENCY)
Facility: HOSPITAL | Age: 39
Discharge: HOME | End: 2019-09-04
Attending: EMERGENCY MEDICINE | Admitting: EMERGENCY MEDICINE

## 2019-09-04 ENCOUNTER — APPOINTMENT (OUTPATIENT)
Dept: GENERAL RADIOLOGY | Facility: HOSPITAL | Age: 39
End: 2019-09-04

## 2019-09-04 VITALS
SYSTOLIC BLOOD PRESSURE: 132 MMHG | RESPIRATION RATE: 18 BRPM | HEIGHT: 69 IN | WEIGHT: 190 LBS | TEMPERATURE: 98 F | DIASTOLIC BLOOD PRESSURE: 90 MMHG | BODY MASS INDEX: 28.14 KG/M2 | HEART RATE: 67 BPM | OXYGEN SATURATION: 99 %

## 2019-09-04 DIAGNOSIS — S02.2XXA CLOSED FRACTURE OF NASAL BONE, INITIAL ENCOUNTER: Primary | ICD-10-CM

## 2019-09-04 PROCEDURE — 70160 X-RAY EXAM OF NASAL BONES: CPT

## 2019-09-04 PROCEDURE — 99283 EMERGENCY DEPT VISIT LOW MDM: CPT

## 2019-09-04 RX ORDER — IBUPROFEN 600 MG/1
600 TABLET ORAL EVERY 8 HOURS PRN
Qty: 12 TABLET | Refills: 0 | OUTPATIENT
Start: 2019-09-04 | End: 2020-12-12

## 2019-09-04 RX ORDER — IBUPROFEN 600 MG/1
600 TABLET ORAL ONCE
Status: COMPLETED | OUTPATIENT
Start: 2019-09-04 | End: 2019-09-04

## 2019-09-04 RX ORDER — ACETAMINOPHEN 325 MG/1
650 TABLET ORAL ONCE
Status: COMPLETED | OUTPATIENT
Start: 2019-09-04 | End: 2019-09-04

## 2019-09-04 RX ORDER — SENNOSIDES 8.6 MG
650 CAPSULE ORAL EVERY 8 HOURS PRN
Qty: 12 TABLET | Refills: 0 | OUTPATIENT
Start: 2019-09-04 | End: 2020-12-12

## 2019-09-04 RX ADMIN — IBUPROFEN 600 MG: 600 TABLET ORAL at 10:12

## 2019-09-04 RX ADMIN — ACETAMINOPHEN 650 MG: 325 TABLET, FILM COATED ORAL at 10:11

## 2019-09-04 NOTE — ED PROVIDER NOTES
"Subjective   The patient is here with complaint of nasal pain accidentally\" kicked\" by  her daughter swimming Monday night with soreness to her nose since she does endorse some epistaxis afterwards no LOC reported no neck pain reported presents here for further evaluation  Denies pregnancy... Started cycle today        History provided by:  Patient      Review of Systems   Constitutional: Negative.    HENT: Positive for congestion and nosebleeds.    Eyes: Negative.    Respiratory: Negative.    Gastrointestinal: Negative.    Genitourinary: Negative.    Musculoskeletal: Negative.    Neurological: Negative.         No loc   Psychiatric/Behavioral: Negative.    All other systems reviewed and are negative.      Past Medical History:   Diagnosis Date   • ADHD (attention deficit hyperactivity disorder) 10/2002   • Allergic 2002    Had prick test done several years ago. Didn't explain sympto   • Anxiety 10/2006    I have a stressful job   • Exposure to sexually transmitted disease (STD)    • History of anemia    • History of body piercing    • History of ECG 2016   • History of hypertension    • History of kidney infection    • History of migraine    • Low back pain 2016    MRI showed bulging discs at L4 & L5. 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   • Visual impairment 2002    I wear glasses       Allergies   Allergen Reactions   • Augmentin [Amoxicillin-Pot Clavulanate]    • Bactrim [Sulfamethoxazole-Trimethoprim]        Past Surgical History:   Procedure Laterality Date   • BREAST SURGERY  2016    Breast augmentation   •  SECTION     • COSMETIC SURGERY  2016    Breast augmentation   • FOREARM FRACTURE SURGERY     • UMBILICAL HERNIA REPAIR     • WISDOM TOOTH EXTRACTION         Family History   Problem Relation Age of Onset   • Hyperlipidemia Mother    • Obesity Mother    • Osteoporosis Mother  "   • Arthritis Father    • Hyperlipidemia Father    • Hypertension Father    • Mental illness Father    • Migraines Father    • Obesity Father    • Arthritis Other    • Arthritis Paternal Uncle         Rheumatoid arthritis       Social History     Socioeconomic History   • Marital status:      Spouse name: Not on file   • Number of children: Not on file   • Years of education: Not on file   • Highest education level: Not on file   Tobacco Use   • Smoking status: Former Smoker     Packs/day: 0.50     Years: 2.00     Pack years: 1.00     Types: Cigarettes     Start date: 1998     Last attempt to quit: 2000     Years since quittin.0   • Smokeless tobacco: Never Used   • Tobacco comment: Not sure when i quit exactly. I never felt particularly addicted so i just quit.   Substance and Sexual Activity   • Alcohol use: Yes     Types: 2 Glasses of wine, 2 Cans of beer, 2 Shots of liquor per week     Comment: Occasional alcohol use   • Drug use: No   • Sexual activity: Yes     Partners: Male     Birth control/protection: IUD           Objective   Physical Exam   Constitutional: She is oriented to person, place, and time. She appears well-developed and well-nourished.   Afebrile vital signs stable no acute distress   HENT:   Head: Normocephalic.   Right Ear: External ear normal.   Left Ear: External ear normal.   Mouth/Throat: Oropharynx is clear and moist.   Nares clear and patent there is some noted tenderness nasal bridge some slight bruising   Eyes: Conjunctivae and EOM are normal. Pupils are equal, round, and reactive to light.   Neck: Normal range of motion. Neck supple.   Cardiovascular: Normal rate, regular rhythm and intact distal pulses.   Pulmonary/Chest: Effort normal and breath sounds normal.   Musculoskeletal: Normal range of motion.   Neurological: She is alert and oriented to person, place, and time. No cranial nerve deficit or sensory deficit. She exhibits normal muscle tone. Coordination  normal.   Skin: Skin is warm and dry. Capillary refill takes less than 2 seconds.   Psychiatric: She has a normal mood and affect. Her behavior is normal. Judgment and thought content normal.   Nursing note and vitals reviewed.      Procedures           ED Course  ED Course as of Sep 04 1005   Wed Sep 04, 2019   0934 Noted nasal fracture per x-ray  [SC]   0940 Dr. Tavarez  office, Zeina she will follow-up with patient will have patient call her directly for follow-up time an appointment  [SC]   0941 Discussed with patient follow-up to call office today for appointment time  [SC]      ED Course User Index  [SC] Alfredo Timmons PA-C         Final Diagnosis: as of Sep 04 1005   Closed fracture of nasal bone, initial encounter                  MDM  Number of Diagnoses or Management Options     Amount and/or Complexity of Data Reviewed  Review and summarize past medical records: yes  Discuss the patient with other providers: yes    Risk of Complications, Morbidity, and/or Mortality  Presenting problems: moderate  Diagnostic procedures: low  Management options: low                 Alfredo Timmons PA-C  09/04/19 1005

## 2019-10-02 ENCOUNTER — OFFICE VISIT (OUTPATIENT)
Dept: FAMILY MEDICINE CLINIC | Facility: CLINIC | Age: 39
End: 2019-10-02

## 2019-10-02 VITALS
SYSTOLIC BLOOD PRESSURE: 136 MMHG | TEMPERATURE: 98.6 F | DIASTOLIC BLOOD PRESSURE: 80 MMHG | HEART RATE: 95 BPM | HEIGHT: 69 IN | OXYGEN SATURATION: 97 % | BODY MASS INDEX: 27.4 KG/M2 | WEIGHT: 185 LBS

## 2019-10-02 DIAGNOSIS — Z23 NEED FOR INFLUENZA VACCINATION: ICD-10-CM

## 2019-10-02 DIAGNOSIS — L02.214 ABSCESS OF LEFT GROIN: Primary | ICD-10-CM

## 2019-10-02 DIAGNOSIS — T14.8XXA PUNCTURE WOUND: ICD-10-CM

## 2019-10-02 DIAGNOSIS — Z23 NEED FOR TETANUS BOOSTER: ICD-10-CM

## 2019-10-02 PROCEDURE — 90674 CCIIV4 VAC NO PRSV 0.5 ML IM: CPT | Performed by: FAMILY MEDICINE

## 2019-10-02 PROCEDURE — 99213 OFFICE O/P EST LOW 20 MIN: CPT | Performed by: FAMILY MEDICINE

## 2019-10-02 PROCEDURE — 90471 IMMUNIZATION ADMIN: CPT | Performed by: FAMILY MEDICINE

## 2019-10-02 PROCEDURE — 90472 IMMUNIZATION ADMIN EACH ADD: CPT | Performed by: FAMILY MEDICINE

## 2019-10-02 PROCEDURE — 90715 TDAP VACCINE 7 YRS/> IM: CPT | Performed by: FAMILY MEDICINE

## 2019-10-02 RX ORDER — CETIRIZINE HYDROCHLORIDE 10 MG/1
10 TABLET ORAL DAILY
COMMUNITY
End: 2020-11-02

## 2019-10-02 NOTE — PROGRESS NOTES
"Subjective   Crystal Eveline Negron is a 39 y.o. female.     History of Present Illness  Ms. Negron presents today with c/o abscess in left groin x 1 month. Overall much improved from previous but doesn't seem to want to heal completely. approx 1 week ago was quite swollen, painful. She punctured with needle and was able to express purulent material. Has \"squeezed\" it daily since that time. No longer having purulent drainage. Much less painful. Wanted to make sure it did not need to be \"opened up\". Denies fever, malaise, leg pain. She is not UTD on tetanus shot.    The following portions of the patient's history were reviewed and updated as appropriate: allergies, current medications, past family history, past medical history, past social history, past surgical history and problem list.    Review of Systems   Constitutional: Negative for fever and unexpected weight change.   HENT: Negative for congestion, rhinorrhea and sore throat.    Eyes: Negative for pain, discharge and redness.   Respiratory: Negative for cough, shortness of breath and wheezing.    Cardiovascular: Negative for chest pain, palpitations and leg swelling.   Gastrointestinal: Negative for diarrhea, nausea and vomiting.   Genitourinary: Negative for dysuria and hematuria.   Skin:        As per HPI   Neurological: Negative for dizziness and weakness.   Hematological: Negative for adenopathy.   Psychiatric/Behavioral: Negative for confusion.       Objective    Vitals:    10/02/19 1133   BP: 136/80   Pulse: 95   Temp: 98.6 °F (37 °C)   SpO2: 97%     Body mass index is 27.32 kg/m².      10/02/19  1133   Weight: 83.9 kg (185 lb)       Physical Exam   Constitutional: She is oriented to person, place, and time. She appears well-developed and well-nourished. She is cooperative. She does not appear ill. No distress.   Genitourinary:         Neurological: She is alert and oriented to person, place, and time. No sensory deficit.   Skin: Skin is warm and dry. " Lesion (as above in  exam) noted. No rash noted.   Psychiatric: She has a normal mood and affect. Her behavior is normal. Cognition and memory are normal.   Nursing note and vitals reviewed.      Assessment/Plan   Leigh was seen today for abscess.    Diagnoses and all orders for this visit:    Abscess of left groin    Puncture wound    Need for influenza vaccination    Need for tetanus booster    Other orders  -     Tdap Vaccine Greater Than or Equal To 6yo IM  -     Flucelvax Quad=>4Years ([id number])       Resolving abscess not requiring I & D at this time. Recommend warm compresses, avoidance of friction/trauma. Tetanus updated.     Patient was encouraged to keep me informed of any acute changes, lack of improvement, or any new concerning symptoms. If any acute worsening or lack of resolution consider I & D, abx etc.  Pt is aware of reasons to seek emergent care.  Patient voiced understanding of all instructions and denied further questions.

## 2020-07-27 DIAGNOSIS — G89.29 CHRONIC BILATERAL LOW BACK PAIN WITHOUT SCIATICA: ICD-10-CM

## 2020-07-27 DIAGNOSIS — M25.561 BILATERAL CHRONIC KNEE PAIN: ICD-10-CM

## 2020-07-27 DIAGNOSIS — M25.562 BILATERAL CHRONIC KNEE PAIN: ICD-10-CM

## 2020-07-27 DIAGNOSIS — M54.50 CHRONIC BILATERAL LOW BACK PAIN WITHOUT SCIATICA: ICD-10-CM

## 2020-07-27 DIAGNOSIS — M54.2 CERVICALGIA: Primary | ICD-10-CM

## 2020-07-27 DIAGNOSIS — G89.29 BILATERAL CHRONIC KNEE PAIN: ICD-10-CM

## 2020-08-12 ENCOUNTER — OFFICE VISIT (OUTPATIENT)
Dept: FAMILY MEDICINE CLINIC | Facility: CLINIC | Age: 40
End: 2020-08-12

## 2020-08-12 VITALS
BODY MASS INDEX: 29.77 KG/M2 | HEIGHT: 69 IN | WEIGHT: 201 LBS | HEART RATE: 78 BPM | SYSTOLIC BLOOD PRESSURE: 130 MMHG | RESPIRATION RATE: 14 BRPM | TEMPERATURE: 98.4 F | OXYGEN SATURATION: 98 % | DIASTOLIC BLOOD PRESSURE: 80 MMHG

## 2020-08-12 DIAGNOSIS — Z79.890 HORMONE REPLACEMENT THERAPY: ICD-10-CM

## 2020-08-12 DIAGNOSIS — Z11.59 NEED FOR HEPATITIS C SCREENING TEST: ICD-10-CM

## 2020-08-12 DIAGNOSIS — Z13.1 SCREENING FOR DIABETES MELLITUS: ICD-10-CM

## 2020-08-12 DIAGNOSIS — E34.9 HORMONE IMBALANCE: ICD-10-CM

## 2020-08-12 DIAGNOSIS — E78.5 DYSLIPIDEMIA: ICD-10-CM

## 2020-08-12 DIAGNOSIS — Z00.00 WELL ADULT EXAM: Primary | ICD-10-CM

## 2020-08-12 DIAGNOSIS — Z51.81 MEDICATION MONITORING ENCOUNTER: ICD-10-CM

## 2020-08-12 DIAGNOSIS — Z11.3 SCREENING FOR STD (SEXUALLY TRANSMITTED DISEASE): ICD-10-CM

## 2020-08-12 DIAGNOSIS — R03.0 PREHYPERTENSION: ICD-10-CM

## 2020-08-12 DIAGNOSIS — Z11.4 SCREENING FOR HIV (HUMAN IMMUNODEFICIENCY VIRUS): ICD-10-CM

## 2020-08-12 PROCEDURE — 99395 PREV VISIT EST AGE 18-39: CPT | Performed by: FAMILY MEDICINE

## 2020-08-12 RX ORDER — TESTOSTERONE CYPIONATE 100 MG/ML
INJECTION, SOLUTION INTRAMUSCULAR
COMMUNITY
End: 2020-11-02

## 2020-08-12 NOTE — PATIENT INSTRUCTIONS
Preventive Care 21-39 Years Old, Female  Preventive care refers to visits with your health care provider and lifestyle choices that can promote health and wellness. This includes:  · A yearly physical exam. This may also be called an annual well check.  · Regular dental visits and eye exams.  · Immunizations.  · Screening for certain conditions.  · Healthy lifestyle choices, such as eating a healthy diet, getting regular exercise, not using drugs or products that contain nicotine and tobacco, and limiting alcohol use.  What can I expect for my preventive care visit?  Physical exam  Your health care provider will check your:  · Height and weight. This may be used to calculate body mass index (BMI), which tells if you are at a healthy weight.  · Heart rate and blood pressure.  · Skin for abnormal spots.  Counseling  Your health care provider may ask you questions about your:  · Alcohol, tobacco, and drug use.  · Emotional well-being.  · Home and relationship well-being.  · Sexual activity.  · Eating habits.  · Work and work environment.  · Method of birth control.  · Menstrual cycle.  · Pregnancy history.  What immunizations do I need?    Influenza (flu) vaccine  · This is recommended every year.  Tetanus, diphtheria, and pertussis (Tdap) vaccine  · You may need a Td booster every 10 years.  Varicella (chickenpox) vaccine  · You may need this if you have not been vaccinated.  Human papillomavirus (HPV) vaccine  · If recommended by your health care provider, you may need three doses over 6 months.  Measles, mumps, and rubella (MMR) vaccine  · You may need at least one dose of MMR. You may also need a second dose.  Meningococcal conjugate (MenACWY) vaccine  · One dose is recommended if you are age 19-21 years and a first-year college student living in a residence [place], or if you have one of several medical conditions. You may also need additional booster doses.  Pneumococcal conjugate (PCV13) vaccine  · You may need  this if you have certain conditions and were not previously vaccinated.  Pneumococcal polysaccharide (PPSV23) vaccine  · You may need one or two doses if you smoke cigarettes or if you have certain conditions.  Hepatitis A vaccine  · You may need this if you have certain conditions or if you travel or work in places where you may be exposed to hepatitis A.  Hepatitis B vaccine  · You may need this if you have certain conditions or if you travel or work in places where you may be exposed to hepatitis B.  Haemophilus influenzae type b (Hib) vaccine  · You may need this if you have certain conditions.  You may receive vaccines as individual doses or as more than one vaccine together in one shot (combination vaccines). Talk with your health care provider about the risks and benefits of combination vaccines.  What tests do I need?    Blood tests  · Lipid and cholesterol levels. These may be checked every 5 years starting at age 20.  · Hepatitis C test.  · Hepatitis B test.  Screening  · Diabetes screening. This is done by checking your blood sugar (glucose) after you have not eaten for a while (fasting).  · Sexually transmitted disease (STD) testing.  · BRCA-related cancer screening. This may be done if you have a family history of breast, ovarian, tubal, or peritoneal cancers.  · Pelvic exam and Pap test. This may be done every 3 years starting at age 21. Starting at age 30, this may be done every 5 years if you have a Pap test in combination with an HPV test.  Talk with your health care provider about your test results, treatment options, and if necessary, the need for more tests.  Follow these instructions at home:  Eating and drinking    · Eat a diet that includes fresh fruits and vegetables, whole grains, lean protein, and low-fat dairy.  · Take vitamin and mineral supplements as recommended by your health care provider.  · Do not drink alcohol if:  ? Your health care provider tells you not to drink.  ? You are  pregnant, may be pregnant, or are planning to become pregnant.  · If you drink alcohol:  ? Limit how much you have to 0-1 drink a day.  ? Be aware of how much alcohol is in your drink. In the [place] one drink equals one 12 oz bottle of beer (355 mL), one 5 oz glass of wine (148 mL), or one 1½ oz glass of hard liquor (44 mL).  Lifestyle  · Take daily care of your teeth and gums.  · Stay active. Exercise for at least 30 minutes on 5 or more days each week.  · Do not use any products that contain nicotine or tobacco, such as cigarettes, e-cigarettes, and chewing tobacco. If you need help quitting, ask your health care provider.  · If you are sexually active, practice safe sex. Use a condom or other form of birth control (contraception) in order to prevent pregnancy and STIs (sexually transmitted infections). If you plan to become pregnant, see your health care provider for a preconception visit.  What's next?  · Visit your health care provider once a year for a well check visit.  · Ask your health care provider how often you should have your eyes and teeth checked.  · Stay up to date on all vaccines.  This information is not intended to replace advice given to you by your health care provider. Make sure you discuss any questions you have with your health care provider.  Document Released: 02/13/2003 Document Revised: 08/29/2019 Document Reviewed: 08/29/2019  [employer]vier Patient Education © 2020 [employer]

## 2020-08-12 NOTE — PROGRESS NOTES
Subjective   Leigh Negron is a 40 y.o. female.     History of Present Illness   Mrs. Jara presents today for annual checkup.    MP 2020.   status post  2009  IUD parguard placed 2010  Last Pap smear 2018 normal with negative high-risk HPV testing.  Distant history of abnormal Pap smear in .     She is a former smoker.  Smoked approximately 1 pack/day for 2 years.  Quit in the year .    Endorses moderate alcohol intake.  Getting regular exercise 3 times per week for 30 to 60 minutes.  She is wearing her seatbelt.  Has a firearm in home.  She is up-to-date on vaccinations.  Last dental checkup 6 months ago.    She is sexually active.  Has had 1 sexual partner in the past year.  Endorses 40? sexual partners in her lifetime.  She is requesting routine STI screening.  Denies genital lesion, vaginal discharge or pelvic pain.  No fever.    She is currently on hormone replacement via a hormone clinic out of [place].  She has been taking testosterone injections, oral progesterone for approximately 2 years.  Initially on testosterone pellets.  Taking progesterone 14 days out of her 30-day cycle.  Still having regular menses.  Last routine labs she believes were in  of this year.  Previously taking growth hormone but has subsequently discontinued this.  Apparently her testosterone levels were quite high, dosing was decreased and she is due for recheck.  She is wondering if I can take over her hormone prescriptions. On higher doses of testosterone she had weight gain, acne, male pattern hair growth, etc.    The following portions of the patient's history were reviewed and updated as appropriate: allergies, current medications, past family history, past medical history, past social history, past surgical history and problem list.    Review of Systems   Constitutional: Positive for unexpected weight change. Negative for fatigue and fever.   HENT: Negative for congestion, mouth sores,  rhinorrhea, sore throat and trouble swallowing.    Eyes: Negative for visual disturbance.   Respiratory: Negative for cough, shortness of breath and wheezing.    Cardiovascular: Negative for chest pain, palpitations and leg swelling.   Gastrointestinal: Negative for abdominal pain, blood in stool, constipation, diarrhea and vomiting.   Endocrine: Negative for polydipsia and polyuria.   Genitourinary: Negative for dyspareunia, dysuria, frequency, genital sores, hematuria, pelvic pain, urgency and vaginal discharge.   Musculoskeletal: Positive for arthralgias and myalgias. Negative for back pain.   Skin: Negative for rash and wound.   Neurological: Positive for headaches. Negative for dizziness, tremors, syncope and weakness.   Hematological: Negative for adenopathy. Does not bruise/bleed easily.   Psychiatric/Behavioral: Negative for confusion, dysphoric mood and sleep disturbance. The patient is not nervous/anxious.    Pt's previous ROS reviewed and updated as indicated.       Objective    Vitals:    08/12/20 1317   BP: 130/80   Pulse: 78   Resp: 14   Temp: 98.4 °F (36.9 °C)   SpO2: 98%     Body mass index is 29.68 kg/m².      08/12/20  1317   Weight: 91.2 kg (201 lb)       Physical Exam   Constitutional: She is oriented to person, place, and time. She appears well-developed and well-nourished. She is cooperative. She does not appear ill. No distress.   overweight   HENT:   Head: Normocephalic and atraumatic.   Eyes: Conjunctivae and EOM are normal. No scleral icterus.   Neck: Phonation normal. Neck supple. Normal carotid pulses present. No thyromegaly present.   Cardiovascular: Normal rate, regular rhythm, S1 normal, S2 normal and intact distal pulses. Exam reveals no gallop.   No murmur heard.  Pulmonary/Chest: Effort normal and breath sounds normal.   Abdominal: Soft. Bowel sounds are normal. She exhibits no distension and no mass. There is no hepatosplenomegaly. There is no tenderness.   Musculoskeletal: She  exhibits no edema, tenderness or deformity.     Vascular Status -  Her right foot exhibits no edema. Her left foot exhibits no edema.  Lymphadenopathy:     She has no cervical adenopathy.   Neurological: She is alert and oriented to person, place, and time. She has normal strength. She displays no tremor. Gait normal.   Skin: Skin is warm and dry. No ecchymosis and no rash noted. No cyanosis. Nails show no clubbing.   Psychiatric: She has a normal mood and affect. Judgment and thought content normal. Her speech is rapid and/or pressured (mildly). She is hyperactive (mildly). Cognition and memory are normal.   Good eye contact. Answers questions appropriately. Good personal hygiene and grooming.   Nursing note and vitals reviewed.      Assessment/Plan   Leigh was seen today for annual exam.    Diagnoses and all orders for this visit:    Well adult exam    Hormone imbalance  -     Estradiol; Future  -     Cortisol; Future  -     Progesterone; Future  -     Testosterone, Free, Total; Future  -     DHEA; Future  -     DHEA-Sulfate; Future  -     Androstenedione; Future  -     Dihydrotestosterone; Future  -     Sex Horm Binding Globulin; Future    Prehypertension  -     CBC (No Diff); Future    Dyslipidemia  -     Lipid Panel; Future    Screening for diabetes mellitus  -     Hemoglobin A1c; Future    Need for hepatitis C screening test  -     Hepatitis C Antibody; Future    Screening for HIV (human immunodeficiency virus)  -     HIV-1 / O / 2 Ag / Antibody 4th Generation; Future    Medication monitoring encounter  -     AST; Future  -     ALT; Future  -     Insulin, Total; Future  -     Estradiol; Future  -     Cortisol; Future  -     Progesterone; Future  -     Testosterone, Free, Total; Future  -     CBC (No Diff); Future  -     DHEA; Future  -     DHEA-Sulfate; Future  -     Androstenedione; Future  -     Dihydrotestosterone; Future  -     Sex Horm Binding Globulin; Future    Hormone replacement therapy  -     AST;  Future  -     ALT; Future  -     Insulin, Total; Future  -     Estradiol; Future  -     Cortisol; Future  -     Progesterone; Future  -     Testosterone, Free, Total; Future  -     CBC (No Diff); Future  -     DHEA; Future  -     DHEA-Sulfate; Future  -     Androstenedione; Future  -     Dihydrotestosterone; Future  -     Sex Horm Binding Globulin; Future    Screening for STD (sexually transmitted disease)  -     Chlamydia trachomatis, Neisseria gonorrhoeae, PCR - Urine, Urine, Clean Catch; Future    BMI 29.0-29.9,adult       Age appropriate preventive care reviewed including cancer screenings, safety measures, mental health concerns, supplements, prevention of CV disease and DM, etc. Handout provided. Safe sex practices encouraged.    She will return fasting for routine screening for dyslipidemia, diabetes. Orders entered for routine STI screening to be done at that time as well.    Hormone imbalance currently being treated with testosterone injections, oral progesterone.  I have reviewed with her in detail risk/benefits of this treatment as well as potential short-term and long-term side effects.  I have declined to refill these prescriptions at this time and recommended that she discuss further with gynecology.  She is amenable to that.    She is also due for replacement/renewal of IUD as ParaGard was placed in February 2010.  Refer to gynecology as above.    She wishes to schedule her own appointment with gynecology and will let me know name of provider so that labs can be appropriately forwarded.    She will follow-up annually for recheck, sooner as needed.  I will contact patient regarding test results and provide instructions regarding any necessary changes in plan of care.  Patient was encouraged to keep me informed of any acute changes, or any new concerning symptoms.  Pt is aware of reasons to seek emergent care.  Patient voiced understanding of all instructions and denied further  questions.            Please note that portions of this note may have been completed with a voice recognition program. Efforts were made to edit the dictations, but occasionally words are mistranscribed.

## 2020-08-13 ENCOUNTER — RESULTS ENCOUNTER (OUTPATIENT)
Dept: FAMILY MEDICINE CLINIC | Facility: CLINIC | Age: 40
End: 2020-08-13

## 2020-08-13 DIAGNOSIS — Z51.81 MEDICATION MONITORING ENCOUNTER: ICD-10-CM

## 2020-08-13 DIAGNOSIS — E34.9 HORMONE IMBALANCE: ICD-10-CM

## 2020-08-13 DIAGNOSIS — E78.5 DYSLIPIDEMIA: ICD-10-CM

## 2020-08-13 DIAGNOSIS — Z79.890 HORMONE REPLACEMENT THERAPY: ICD-10-CM

## 2020-08-13 DIAGNOSIS — Z11.3 SCREENING FOR STD (SEXUALLY TRANSMITTED DISEASE): ICD-10-CM

## 2020-08-13 DIAGNOSIS — Z11.59 NEED FOR HEPATITIS C SCREENING TEST: ICD-10-CM

## 2020-08-13 DIAGNOSIS — R03.0 PREHYPERTENSION: ICD-10-CM

## 2020-08-13 DIAGNOSIS — Z11.4 SCREENING FOR HIV (HUMAN IMMUNODEFICIENCY VIRUS): ICD-10-CM

## 2020-08-13 DIAGNOSIS — Z13.1 SCREENING FOR DIABETES MELLITUS: ICD-10-CM

## 2020-08-14 RX ORDER — VALACYCLOVIR HYDROCHLORIDE 500 MG/1
TABLET, FILM COATED ORAL
COMMUNITY
End: 2020-09-09 | Stop reason: SDUPTHER

## 2020-08-14 RX ORDER — ESCITALOPRAM OXALATE 5 MG/1
TABLET ORAL
COMMUNITY
End: 2020-11-02

## 2020-08-14 RX ORDER — ONDANSETRON 4 MG/1
TABLET, ORALLY DISINTEGRATING ORAL
COMMUNITY
End: 2020-11-02

## 2020-09-14 RX ORDER — VALACYCLOVIR HYDROCHLORIDE 500 MG/1
500 TABLET, FILM COATED ORAL DAILY
Qty: 30 TABLET | Refills: 11 | Status: SHIPPED | OUTPATIENT
Start: 2020-09-14 | End: 2021-05-28 | Stop reason: SDUPTHER

## 2020-10-05 ENCOUNTER — HOSPITAL ENCOUNTER (EMERGENCY)
Facility: HOSPITAL | Age: 40
Discharge: HOME | End: 2020-10-05
Attending: STUDENT IN AN ORGANIZED HEALTH CARE EDUCATION/TRAINING PROGRAM | Admitting: STUDENT IN AN ORGANIZED HEALTH CARE EDUCATION/TRAINING PROGRAM

## 2020-10-05 VITALS
SYSTOLIC BLOOD PRESSURE: 132 MMHG | HEART RATE: 97 BPM | DIASTOLIC BLOOD PRESSURE: 93 MMHG | OXYGEN SATURATION: 98 % | BODY MASS INDEX: 28.14 KG/M2 | RESPIRATION RATE: 18 BRPM | WEIGHT: 190 LBS | HEIGHT: 69 IN | TEMPERATURE: 98.7 F

## 2020-10-05 DIAGNOSIS — M54.9 BACK PAIN WITH SCIATICA: Primary | ICD-10-CM

## 2020-10-05 DIAGNOSIS — M54.30 BACK PAIN WITH SCIATICA: Primary | ICD-10-CM

## 2020-10-05 PROCEDURE — 25010000002 KETOROLAC TROMETHAMINE PER 15 MG: Performed by: PHYSICIAN ASSISTANT

## 2020-10-05 PROCEDURE — 99282 EMERGENCY DEPT VISIT SF MDM: CPT

## 2020-10-05 PROCEDURE — 25010000002 ORPHENADRINE CITRATE PER 60 MG: Performed by: PHYSICIAN ASSISTANT

## 2020-10-05 PROCEDURE — 25010000002 METHYLPREDNISOLONE PER 125 MG: Performed by: PHYSICIAN ASSISTANT

## 2020-10-05 PROCEDURE — 96372 THER/PROPH/DIAG INJ SC/IM: CPT

## 2020-10-05 RX ORDER — KETOROLAC TROMETHAMINE 30 MG/ML
60 INJECTION, SOLUTION INTRAMUSCULAR; INTRAVENOUS ONCE
Status: COMPLETED | OUTPATIENT
Start: 2020-10-05 | End: 2020-10-05

## 2020-10-05 RX ORDER — METHYLPREDNISOLONE SODIUM SUCCINATE 125 MG/2ML
125 INJECTION, POWDER, LYOPHILIZED, FOR SOLUTION INTRAMUSCULAR; INTRAVENOUS ONCE
Status: COMPLETED | OUTPATIENT
Start: 2020-10-05 | End: 2020-10-05

## 2020-10-05 RX ORDER — ORPHENADRINE CITRATE 30 MG/ML
60 INJECTION INTRAMUSCULAR; INTRAVENOUS ONCE
Status: COMPLETED | OUTPATIENT
Start: 2020-10-05 | End: 2020-10-05

## 2020-10-05 RX ORDER — PREDNISONE 20 MG/1
20 TABLET ORAL 3 TIMES DAILY
Qty: 15 TABLET | Refills: 0 | Status: SHIPPED | OUTPATIENT
Start: 2020-10-05 | End: 2020-10-10

## 2020-10-05 RX ORDER — KETOROLAC TROMETHAMINE 10 MG/1
10 TABLET, FILM COATED ORAL EVERY 6 HOURS PRN
Qty: 15 TABLET | Refills: 0 | Status: SHIPPED | OUTPATIENT
Start: 2020-10-05 | End: 2020-11-02

## 2020-10-05 RX ORDER — ORPHENADRINE CITRATE 100 MG/1
100 TABLET, EXTENDED RELEASE ORAL 2 TIMES DAILY
Qty: 20 TABLET | Refills: 0 | OUTPATIENT
Start: 2020-10-05 | End: 2020-12-12

## 2020-10-05 RX ADMIN — ORPHENADRINE CITRATE 60 MG: 30 INJECTION INTRAMUSCULAR; INTRAVENOUS at 22:31

## 2020-10-05 RX ADMIN — KETOROLAC TROMETHAMINE 60 MG: 60 INJECTION, SOLUTION INTRAMUSCULAR at 22:31

## 2020-10-05 RX ADMIN — METHYLPREDNISOLONE SODIUM SUCCINATE 125 MG: 125 INJECTION, POWDER, FOR SOLUTION INTRAMUSCULAR; INTRAVENOUS at 22:30

## 2020-10-06 NOTE — ED PROVIDER NOTES
"Subjective   This a 41 y/o female that comes in with c/c \"Back pain with sciatica\" x just prior to arrival. Patient states that she was sitting at restaurant and went to stand up and was unable to ambulate. Patient states that pain radiates down right lower leg. Denies any fecal or urinary incontinence. Patient states has history of bulging disc disease. Patient denies any saddle paresthesias.       History provided by:  Patient   used: No    Back Pain  Location:  Lumbar spine  Quality:  Aching and stiffness  Radiates to:  R posterior upper leg  Pain severity:  Moderate  Pain is:  Worse during the day  Onset quality:  Gradual  Duration:  1 day  Timing:  Intermittent  Progression:  Worsening  Chronicity:  New  Context: not emotional stress, not falling, not jumping from heights, not MCA, not MVA, not occupational injury, not physical stress, not recent illness and not twisting    Relieved by:  Nothing  Worsened by:  Nothing  Ineffective treatments:  None tried  Associated symptoms: paresthesias    Associated symptoms: no abdominal pain, no abdominal swelling, no bladder incontinence, no fever, no headaches, no numbness, no pelvic pain, no perianal numbness and no weakness    Risk factors: no hx of cancer, no hx of osteoporosis, no lack of exercise, no menopause, not obese, no recent surgery and no steroid use        Review of Systems   Constitutional: Positive for fatigue. Negative for fever.   Eyes: Negative.    Respiratory: Negative.  Negative for choking, chest tightness and shortness of breath.    Cardiovascular: Negative.    Gastrointestinal: Negative.  Negative for abdominal pain.   Endocrine: Negative.  Negative for cold intolerance and polydipsia.   Genitourinary: Negative.  Negative for bladder incontinence and pelvic pain.   Musculoskeletal: Positive for arthralgias, back pain and myalgias.   Skin: Negative.  Negative for color change and pallor.   Neurological: Positive for " paresthesias. Negative for weakness, numbness and headaches.   All other systems reviewed and are negative.      Past Medical History:   Diagnosis Date   • ADHD (attention deficit hyperactivity disorder) 10/2002   • Allergic 2002    Had prick test done several years ago. Didn't explain sympto   • Anxiety 10/2006    I have a stressful job   • Exposure to sexually transmitted disease (STD)    • History of anemia    • History of body piercing    • History of ECG 2016   • History of hypertension    • History of kidney infection    • History of migraine    • Low back pain 2016    MRI showed bulging discs at L4 & L5. 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   • Visual impairment 2002    I wear glasses       Allergies   Allergen Reactions   • Augmentin [Amoxicillin-Pot Clavulanate]    • Bactrim [Sulfamethoxazole-Trimethoprim]        Past Surgical History:   Procedure Laterality Date   • BREAST SURGERY  2016    Breast augmentation   •  SECTION     • COSMETIC SURGERY  2016    Breast augmentation   • FOREARM FRACTURE SURGERY     • UMBILICAL HERNIA REPAIR     • WISDOM TOOTH EXTRACTION         Family History   Problem Relation Age of Onset   • Hyperlipidemia Mother    • Obesity Mother    • Osteoporosis Mother    • Arthritis Father    • Hyperlipidemia Father    • Hypertension Father    • Mental illness Father    • Migraines Father    • Obesity Father    • Arthritis Other    • Arthritis Paternal Uncle         Rheumatoid arthritis       Social History     Socioeconomic History   • Marital status:      Spouse name: Not on file   • Number of children: Not on file   • Years of education: Not on file   • Highest education level: Not on file   Tobacco Use   • Smoking status: Former Smoker     Packs/day: 0.50     Years: 2.00     Pack years: 1.00     Types: Cigarettes     Start date: 1998     Quit  date: 2000     Years since quittin.1   • Smokeless tobacco: Never Used   • Tobacco comment: Not sure when i quit exactly. I never felt particularly addicted so i just quit.   Substance and Sexual Activity   • Alcohol use: Yes     Types: 2 Glasses of wine, 2 Cans of beer, 2 Shots of liquor per week     Comment: Occasional alcohol use   • Drug use: No   • Sexual activity: Yes     Partners: Male     Birth control/protection: I.U.D.           Objective   Physical Exam  Vitals signs and nursing note reviewed.   Constitutional:       General: She is not in acute distress.     Appearance: Normal appearance. She is normal weight. She is not ill-appearing, toxic-appearing or diaphoretic.   HENT:      Head: Normocephalic and atraumatic.      Right Ear: Tympanic membrane and ear canal normal. There is no impacted cerumen.      Left Ear: Tympanic membrane and ear canal normal. There is no impacted cerumen.      Nose: No congestion or rhinorrhea.      Mouth/Throat:      Mouth: Mucous membranes are moist.      Pharynx: Oropharynx is clear. No oropharyngeal exudate or posterior oropharyngeal erythema.   Eyes:      General: No scleral icterus.        Right eye: No discharge.         Left eye: No discharge.      Extraocular Movements: Extraocular movements intact.      Conjunctiva/sclera: Conjunctivae normal.      Pupils: Pupils are equal, round, and reactive to light.   Neck:      Musculoskeletal: Normal range of motion. No neck rigidity or muscular tenderness.      Vascular: No carotid bruit.   Cardiovascular:      Rate and Rhythm: Normal rate and regular rhythm.      Pulses: Normal pulses.      Heart sounds: Normal heart sounds. No murmur. No friction rub. No gallop.    Pulmonary:      Effort: Pulmonary effort is normal. No respiratory distress.      Breath sounds: Normal breath sounds. No stridor. No wheezing, rhonchi or rales.   Chest:      Chest wall: No tenderness.   Abdominal:      General: Abdomen is flat. Bowel  sounds are normal. There is no distension.      Palpations: There is no mass.      Tenderness: There is no abdominal tenderness. There is no right CVA tenderness, guarding or rebound.      Hernia: No hernia is present.   Musculoskeletal:         General: Tenderness and signs of injury present. No deformity.      Lumbar back: She exhibits decreased range of motion, tenderness, pain and spasm.      Right lower leg: No edema.      Left lower leg: No edema.   Lymphadenopathy:      Cervical: No cervical adenopathy.   Skin:     General: Skin is warm and dry.      Capillary Refill: Capillary refill takes less than 2 seconds.      Coloration: Skin is not jaundiced or pale.      Findings: No bruising, erythema, lesion or rash.   Neurological:      General: No focal deficit present.      Mental Status: She is alert and oriented to person, place, and time.      Cranial Nerves: No cranial nerve deficit.      Sensory: No sensory deficit.      Motor: No weakness.      Coordination: Coordination normal.      Gait: Gait normal.      Deep Tendon Reflexes: Reflexes normal.   Psychiatric:         Mood and Affect: Mood normal.         Behavior: Behavior normal.         Thought Content: Thought content normal.         Judgment: Judgment normal.         Procedures           ED Course  ED Course as of Oct 05 2223   Mon Oct 05, 2020   2220 Call placed to Dr. Prarish to arrange close follow-up.     []   2221 Discussed care with Dr. Parrish. Advised to have patient call office in a.m. Will follow-up for MRI.     []      ED Course User Index  [] Nikos Lewis PA-C                                           [facility]    Final diagnoses:   Back pain with sciatica            Nikos Lewis PA-C  10/05/20 2223

## 2020-10-28 ENCOUNTER — FLU SHOT (OUTPATIENT)
Dept: FAMILY MEDICINE CLINIC | Facility: CLINIC | Age: 40
End: 2020-10-28

## 2020-10-28 ENCOUNTER — LAB (OUTPATIENT)
Dept: FAMILY MEDICINE CLINIC | Facility: CLINIC | Age: 40
End: 2020-10-28

## 2020-10-28 DIAGNOSIS — Z23 NEED FOR INFLUENZA VACCINATION: Primary | ICD-10-CM

## 2020-10-28 PROCEDURE — 90686 IIV4 VACC NO PRSV 0.5 ML IM: CPT | Performed by: FAMILY MEDICINE

## 2020-10-28 PROCEDURE — 90471 IMMUNIZATION ADMIN: CPT | Performed by: FAMILY MEDICINE

## 2020-11-02 ENCOUNTER — OFFICE VISIT (OUTPATIENT)
Dept: FAMILY MEDICINE CLINIC | Facility: CLINIC | Age: 40
End: 2020-11-02

## 2020-11-02 VITALS
HEIGHT: 69 IN | DIASTOLIC BLOOD PRESSURE: 82 MMHG | WEIGHT: 191 LBS | OXYGEN SATURATION: 98 % | BODY MASS INDEX: 28.29 KG/M2 | SYSTOLIC BLOOD PRESSURE: 150 MMHG | TEMPERATURE: 97.7 F | HEART RATE: 78 BPM

## 2020-11-02 DIAGNOSIS — Z01.818 PRE-OP EXAMINATION: Primary | ICD-10-CM

## 2020-11-02 LAB
ALT SERPL-CCNC: 20 U/L (ref 1–33)
ANDROST SERPL-MCNC: 134 NG/DL (ref 41–262)
ANDROSTANOLONE SERPL-MCNC: 6.4 NG/DL
AST SERPL-CCNC: 16 U/L (ref 1–32)
C TRACH RRNA SPEC QL NAA+PROBE: NEGATIVE
CHOLEST SERPL-MCNC: 213 MG/DL (ref 0–200)
CORTIS SERPL-MCNC: 9.3 UG/DL
DHEA SERPL-MCNC: 388 NG/DL (ref 31–701)
DHEA-S SERPL-MCNC: 152 UG/DL (ref 57.3–279.2)
ERYTHROCYTE [DISTWIDTH] IN BLOOD BY AUTOMATED COUNT: 12.8 % (ref 12.3–15.4)
ESTRADIOL SERPL-MCNC: 141 PG/ML
HBA1C MFR BLD: 5.1 % (ref 4.8–5.6)
HCT VFR BLD AUTO: 46.5 % (ref 34–46.6)
HCV AB S/CO SERPL IA: <0.1 S/CO RATIO (ref 0–0.9)
HDLC SERPL-MCNC: 73 MG/DL (ref 40–60)
HGB BLD-MCNC: 14.9 G/DL (ref 12–15.9)
HIV 1+2 AB+HIV1 P24 AG SERPL QL IA: NON REACTIVE
INSULIN SERPL-ACNC: 8.6 UIU/ML (ref 2.6–24.9)
LDLC SERPL CALC-MCNC: 124 MG/DL (ref 0–100)
MCH RBC QN AUTO: 29.2 PG (ref 26.6–33)
MCHC RBC AUTO-ENTMCNC: 32 G/DL (ref 31.5–35.7)
MCV RBC AUTO: 91.2 FL (ref 79–97)
N GONORRHOEA RRNA SPEC QL NAA+PROBE: NEGATIVE
PLATELET # BLD AUTO: 241 10*3/MM3 (ref 140–450)
PROGEST SERPL-MCNC: 11.5 NG/ML
RBC # BLD AUTO: 5.1 10*6/MM3 (ref 3.77–5.28)
SHBG SERPL-SCNC: 40.9 NMOL/L (ref 24.6–122)
TESTOST FREE SERPL-MCNC: 2.3 PG/ML (ref 0–4.2)
TESTOST SERPL-MCNC: 10 NG/DL (ref 8–48)
TRIGL SERPL-MCNC: 93 MG/DL (ref 0–150)
VLDLC SERPL CALC-MCNC: 16 MG/DL (ref 5–40)
WBC # BLD AUTO: 4.11 10*3/MM3 (ref 3.4–10.8)

## 2020-11-02 PROCEDURE — 99213 OFFICE O/P EST LOW 20 MIN: CPT | Performed by: NURSE PRACTITIONER

## 2020-11-02 NOTE — PROGRESS NOTES
"      Subjective     Chief Complaint:    Chief Complaint   Patient presents with   • Pre-op Exam     back        History of Present Illness:   Here for pre op exam. Is having disectomy with Dr. Parrish on Wednesday. This will be done at [facility]. She has back x 6-7 months.   No soa or chest pain. Is able to walk up a hill without dyspnea or chest pain.   No GARDENIA  No trouble with surgery priorr.   Has dizziness at time but notes that she has BPPV.     Review of Systems  Gen- No fevers, chills  CV- No chest pain, palpitations  Resp- No cough, dyspnea  GI- No N/V/D, abd pain  Neuro-No dizziness, headaches      I have reviewed and/or updated the patient's past medical, surgical, family, social history and problem list as appropriate.     Medications:    Current Outpatient Medications:   •  acetaminophen (TYLENOL 8 HOUR) 650 MG 8 hr tablet, Take 1 tablet by mouth Every 8 (Eight) Hours As Needed for Mild Pain ., Disp: 12 tablet, Rfl: 0  •  amphetamine-dextroamphetamine (ADDERALL) 10 MG tablet, Take  by mouth daily., Disp: , Rfl:   •  fluticasone (FLONASE) 50 MCG/ACT nasal spray, , Disp: , Rfl:   •  ibuprofen (ADVIL,MOTRIN) 600 MG tablet, Take 1 tablet by mouth Every 8 (Eight) Hours As Needed for Mild Pain ., Disp: 12 tablet, Rfl: 0  •  lisdexamfetamine (VYVANSE) 50 MG capsule, Take  by mouth daily., Disp: , Rfl:   •  orphenadrine (NORFLEX) 100 MG 12 hr tablet, Take 1 tablet by mouth 2 (Two) Times a Day., Disp: 20 tablet, Rfl: 0  •  valACYclovir (VALTREX) 500 MG tablet, Take 1 tablet by mouth Daily., Disp: 30 tablet, Rfl: 11    Allergies:  Allergies   Allergen Reactions   • Augmentin [Amoxicillin-Pot Clavulanate]    • Bactrim [Sulfamethoxazole-Trimethoprim]        Objective     Vital Signs:   Vitals:    11/02/20 1409   BP: 150/82   Pulse: 78   Temp: 97.7 °F (36.5 °C)   SpO2: 98%   Weight: 86.6 kg (191 lb)   Height: 175.3 cm (69\")   PainSc: 0-No pain       Physical Exam:    Physical Exam  Vitals signs and " nursing note reviewed.   Constitutional:       Appearance: She is well-developed.   HENT:      Head: Normocephalic and atraumatic.   Eyes:      Pupils: Pupils are equal, round, and reactive to light.   Neck:      Musculoskeletal: Neck supple.   Cardiovascular:      Rate and Rhythm: Normal rate and regular rhythm.      Heart sounds: Normal heart sounds.   Pulmonary:      Effort: Pulmonary effort is normal.      Breath sounds: Normal breath sounds.   Abdominal:      General: Bowel sounds are normal. There is no distension.      Palpations: Abdomen is soft.      Tenderness: There is no abdominal tenderness.   Skin:     General: Skin is warm and dry.   Neurological:      General: No focal deficit present.      Mental Status: She is alert and oriented to person, place, and time.   Psychiatric:         Mood and Affect: Mood normal.         Behavior: Behavior normal.       ECG 12 Lead    Date/Time: 11/16/2020 3:00 PM  Performed by: Aleksandra Olson APRN  Authorized by: Aleksandra Olson APRN   Rhythm: sinus rhythm  Rate: normal  Conduction: conduction normal  ST Segments: ST segments normal  T Waves: T waves normal  QRS axis: normal    Clinical impression: normal ECG              Assessment / Plan     Assessment/Plan:   Problem List Items Addressed This Visit     None      Visit Diagnoses     Pre-op examination    -  Primary    Relevant Orders    Basic Metabolic Panel (Completed)    ECG 12 Lead        -- ok to proceed from surgery from PCP standpoint. EKG normal. Obtain BMP.     Follow up:  As needed    Electronically signed by STEFFANIE Harman   11/02/2020 14:43 EST      Please note that portions of this note may have been completed with a voice recognition program. Efforts were made to edit the dictations, but occasionally words are mistranscribed.

## 2020-11-03 LAB
BASOPHILS # BLD AUTO: 0.05 10*3/MM3 (ref 0–0.2)
BASOPHILS NFR BLD AUTO: 0.9 % (ref 0–1.5)
BUN SERPL-MCNC: 13 MG/DL (ref 6–20)
BUN/CREAT SERPL: 19.1 (ref 7–25)
CALCIUM SERPL-MCNC: 9.1 MG/DL (ref 8.6–10.5)
CHLORIDE SERPL-SCNC: 106 MMOL/L (ref 98–107)
CO2 SERPL-SCNC: 25.6 MMOL/L (ref 22–29)
CREAT SERPL-MCNC: 0.68 MG/DL (ref 0.57–1)
EOSINOPHIL # BLD AUTO: 0.21 10*3/MM3 (ref 0–0.4)
EOSINOPHIL NFR BLD AUTO: 3.8 % (ref 0.3–6.2)
ERYTHROCYTE [DISTWIDTH] IN BLOOD BY AUTOMATED COUNT: 12.6 % (ref 12.3–15.4)
GLUCOSE SERPL-MCNC: 93 MG/DL (ref 65–99)
HCT VFR BLD AUTO: 40.7 % (ref 34–46.6)
HGB BLD-MCNC: 13.8 G/DL (ref 12–15.9)
IMM GRANULOCYTES # BLD AUTO: 0 10*3/MM3 (ref 0–0.05)
IMM GRANULOCYTES NFR BLD AUTO: 0 % (ref 0–0.5)
LYMPHOCYTES # BLD AUTO: 1.78 10*3/MM3 (ref 0.7–3.1)
LYMPHOCYTES NFR BLD AUTO: 32.3 % (ref 19.6–45.3)
MCH RBC QN AUTO: 29.9 PG (ref 26.6–33)
MCHC RBC AUTO-ENTMCNC: 33.9 G/DL (ref 31.5–35.7)
MCV RBC AUTO: 88.1 FL (ref 79–97)
MONOCYTES # BLD AUTO: 0.35 10*3/MM3 (ref 0.1–0.9)
MONOCYTES NFR BLD AUTO: 6.4 % (ref 5–12)
NEUTROPHILS # BLD AUTO: 3.12 10*3/MM3 (ref 1.7–7)
NEUTROPHILS NFR BLD AUTO: 56.6 % (ref 42.7–76)
NRBC BLD AUTO-RTO: 0 /100 WBC (ref 0–0.2)
PLATELET # BLD AUTO: 262 10*3/MM3 (ref 140–450)
POTASSIUM SERPL-SCNC: 4.1 MMOL/L (ref 3.5–5.2)
RBC # BLD AUTO: 4.62 10*6/MM3 (ref 3.77–5.28)
SODIUM SERPL-SCNC: 141 MMOL/L (ref 136–145)
WBC # BLD AUTO: 5.51 10*3/MM3 (ref 3.4–10.8)

## 2020-11-16 PROCEDURE — 93000 ELECTROCARDIOGRAM COMPLETE: CPT | Performed by: NURSE PRACTITIONER

## 2020-12-12 ENCOUNTER — HOSPITAL ENCOUNTER (EMERGENCY)
Facility: HOSPITAL | Age: 40
Discharge: HOME | End: 2020-12-12
Attending: EMERGENCY MEDICINE | Admitting: EMERGENCY MEDICINE

## 2020-12-12 ENCOUNTER — APPOINTMENT (OUTPATIENT)
Dept: ULTRASOUND IMAGING | Facility: HOSPITAL | Age: 40
End: 2020-12-12

## 2020-12-12 VITALS
TEMPERATURE: 99.9 F | DIASTOLIC BLOOD PRESSURE: 92 MMHG | SYSTOLIC BLOOD PRESSURE: 128 MMHG | BODY MASS INDEX: 29.03 KG/M2 | OXYGEN SATURATION: 98 % | RESPIRATION RATE: 16 BRPM | HEIGHT: 69 IN | HEART RATE: 87 BPM | WEIGHT: 196 LBS

## 2020-12-12 DIAGNOSIS — S76.212A STRAIN OF ADDUCTOR MUSCLE, FASCIA AND TENDON OF LEFT THIGH, INITIAL ENCOUNTER: Primary | ICD-10-CM

## 2020-12-12 PROCEDURE — 93971 EXTREMITY STUDY: CPT

## 2020-12-12 PROCEDURE — 99283 EMERGENCY DEPT VISIT LOW MDM: CPT

## 2020-12-12 PROCEDURE — 63710000001 DEXAMETHASONE PER 0.25 MG: Performed by: PHYSICIAN ASSISTANT

## 2020-12-12 RX ORDER — ACETAMINOPHEN 325 MG/1
650 TABLET ORAL ONCE
Status: COMPLETED | OUTPATIENT
Start: 2020-12-12 | End: 2020-12-12

## 2020-12-12 RX ORDER — DEXAMETHASONE 4 MG/1
4 TABLET ORAL ONCE
Status: COMPLETED | OUTPATIENT
Start: 2020-12-12 | End: 2020-12-12

## 2020-12-12 RX ORDER — CYCLOBENZAPRINE HCL 5 MG
5 TABLET ORAL NIGHTLY PRN
Qty: 7 TABLET | Refills: 0 | Status: SHIPPED | OUTPATIENT
Start: 2020-12-12 | End: 2022-03-16 | Stop reason: SDUPTHER

## 2020-12-12 RX ORDER — PREDNISONE 20 MG/1
20 TABLET ORAL 2 TIMES DAILY
Qty: 8 TABLET | Refills: 0 | Status: SHIPPED | OUTPATIENT
Start: 2020-12-12 | End: 2022-01-12

## 2020-12-12 RX ORDER — SENNOSIDES 8.6 MG
650 CAPSULE ORAL EVERY 8 HOURS PRN
Qty: 12 TABLET | Refills: 0 | Status: SHIPPED | OUTPATIENT
Start: 2020-12-12

## 2020-12-12 RX ADMIN — DEXAMETHASONE 4 MG: 4 TABLET ORAL at 14:23

## 2020-12-12 RX ADMIN — ACETAMINOPHEN 650 MG: 325 TABLET, FILM COATED ORAL at 14:23

## 2020-12-12 NOTE — ED PROVIDER NOTES
Subjective   Patient is here with complaint of some pain radiating left groin area to the left medial thigh no trauma or injury no fever chills patient did have back surgery discectomy  of this year no bowel or bladder dysfunction the back surgery was for a disc problem radiating to the right leg she does not feel this is associated but has noted some pain in the left groin that radiates to the medial aspect, again no fever chills no other systemic complaints patient ambulatory here      History provided by:  Medical records and patient      Review of Systems   Constitutional: Negative.    HENT: Negative.    Eyes: Negative.    Respiratory: Negative.    Cardiovascular: Negative.    Gastrointestinal: Negative.    Genitourinary: Negative.    Musculoskeletal: Positive for arthralgias.   Skin: Negative.    Neurological: Negative for numbness.        Some paresthesia left medial thigh   Psychiatric/Behavioral: Negative.    All other systems reviewed and are negative.      Past Medical History:   Diagnosis Date   • ADHD (attention deficit hyperactivity disorder) 10/2002   • Allergic 2002    Had prick test done several years ago. Didn't explain sympto   • Anxiety 10/2006    I have a stressful job   • Exposure to sexually transmitted disease (STD)    • History of anemia    • History of body piercing    • History of ECG 2016   • History of hypertension    • History of kidney infection    • History of migraine    • Low back pain 2016    MRI showed bulging discs at L4 & L5. 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   • Visual impairment 2002    I wear glasses       Allergies   Allergen Reactions   • Augmentin [Amoxicillin-Pot Clavulanate]    • Bactrim [Sulfamethoxazole-Trimethoprim]        Past Surgical History:   Procedure Laterality Date   • BREAST SURGERY  2016    Breast augmentation   •   SECTION     • COSMETIC SURGERY  2016    Breast augmentation   • FOREARM FRACTURE SURGERY     • UMBILICAL HERNIA REPAIR     • WISDOM TOOTH EXTRACTION         Family History   Problem Relation Age of Onset   • Hyperlipidemia Mother    • Obesity Mother    • Osteoporosis Mother    • Arthritis Father    • Hyperlipidemia Father    • Hypertension Father    • Mental illness Father    • Migraines Father    • Obesity Father    • Arthritis Other    • Arthritis Paternal Uncle         Rheumatoid arthritis       Social History     Socioeconomic History   • Marital status:      Spouse name: Not on file   • Number of children: Not on file   • Years of education: Not on file   • Highest education level: Not on file   Tobacco Use   • Smoking status: Former Smoker     Packs/day: 0.50     Years: 2.00     Pack years: 1.00     Types: Cigarettes     Start date: 1998     Quit date: 2000     Years since quittin.3   • Smokeless tobacco: Never Used   • Tobacco comment: Not sure when i quit exactly. I never felt particularly addicted so i just quit.   Substance and Sexual Activity   • Alcohol use: Yes     Types: 2 Glasses of wine, 2 Cans of beer, 2 Shots of liquor per week     Comment: Occasional alcohol use   • Drug use: No   • Sexual activity: Yes     Partners: Male     Birth control/protection: I.U.D.           Objective   Physical Exam  Vitals signs and nursing note reviewed.   Constitutional:       General: She is not in acute distress.     Appearance: Normal appearance. She is normal weight. She is not ill-appearing or toxic-appearing.      Comments: Well-appearing no acute distress normal gait is observed   HENT:      Head: Normocephalic and atraumatic.   Eyes:      Extraocular Movements: Extraocular movements intact.      Pupils: Pupils are equal, round, and reactive to light.   Neck:      Musculoskeletal: Normal range of motion.   Cardiovascular:      Rate and Rhythm: Normal rate.      Pulses: Normal pulses.            Dorsalis pedis pulses are 2+ on the left side.   Pulmonary:      Effort: Pulmonary effort is normal.   Abdominal:      General: Abdomen is flat.   Musculoskeletal: Normal range of motion.         General: No swelling.      Left lower leg: No edema.      Comments: She has some localized tenderness left medial thigh proximally overlying the adductor musculature   Skin:     General: Skin is warm and dry.      Capillary Refill: Capillary refill takes less than 2 seconds.      Coloration: Skin is not pale.      Findings: No erythema or rash.   Neurological:      General: No focal deficit present.      Mental Status: She is alert. She is disoriented.      Cranial Nerves: No cranial nerve deficit.      Sensory: No sensory deficit.      Motor: No weakness.   Psychiatric:         Mood and Affect: Mood normal.         Behavior: Behavior normal.         Thought Content: Thought content normal.         Judgment: Judgment normal.         Procedures           ED Course  ED Course as of Dec 12 1556   Sat Dec 12, 2020   1422 Patient's exam suggests some musculoskeletal component, overlying the adductor musculature left proximal thigh after discussed with patient she is primarily concerned with possibility of a DVT will proceed with venous ultrasound    [SC]   1551 Venous Doppler ultrasound negative for DVT will plan on discharging home treat with short course of prescription steroids as well as muscle relaxer, recommend follow-up with PCP and Dr. Parrish this week return here if there is any sudden changes worsening symptoms bowel or bladder dysfunction numbness in her leg or any worsening to return here she is understand agree with plan of care    [SC]   1553 Patient is resting comfortably no acute distress  Patient case and management reviewed with Dr. Zhao    [SC]      ED Course User Index  [SC] Alfredo Timmons, SEVEN                                           MDM  Number of Diagnoses or Management Options     Amount  and/or Complexity of Data Reviewed  Review and summarize past medical records: yes  Discuss the patient with other providers: yes    Risk of Complications, Morbidity, and/or Mortality  Presenting problems: moderate  Diagnostic procedures: low  Management options: low        Final diagnoses:   Strain of adductor muscle, fascia and tendon of left thigh, initial encounter            Alfredo Timmons PA-C  12/12/20 1551

## 2021-06-01 RX ORDER — VALACYCLOVIR HYDROCHLORIDE 500 MG/1
500 TABLET, FILM COATED ORAL DAILY
Qty: 30 TABLET | Refills: 5 | Status: SHIPPED | OUTPATIENT
Start: 2021-06-01 | End: 2021-12-07 | Stop reason: SDUPTHER

## 2021-12-08 RX ORDER — VALACYCLOVIR HYDROCHLORIDE 500 MG/1
500 TABLET, FILM COATED ORAL DAILY
Qty: 30 TABLET | Refills: 5 | Status: SHIPPED | OUTPATIENT
Start: 2021-12-08 | End: 2022-03-16 | Stop reason: SDUPTHER

## 2022-01-12 ENCOUNTER — E-VISIT (OUTPATIENT)
Dept: ADMINISTRATIVE | Facility: OTHER | Age: 42
End: 2022-01-12

## 2022-01-12 ENCOUNTER — OFFICE VISIT (OUTPATIENT)
Dept: FAMILY MEDICINE CLINIC | Facility: CLINIC | Age: 42
End: 2022-01-12

## 2022-01-12 VITALS
WEIGHT: 171.8 LBS | HEART RATE: 78 BPM | HEIGHT: 69 IN | BODY MASS INDEX: 25.45 KG/M2 | TEMPERATURE: 98.4 F | DIASTOLIC BLOOD PRESSURE: 92 MMHG | SYSTOLIC BLOOD PRESSURE: 142 MMHG | OXYGEN SATURATION: 98 %

## 2022-01-12 DIAGNOSIS — H65.02 NON-RECURRENT ACUTE SEROUS OTITIS MEDIA OF LEFT EAR: Primary | ICD-10-CM

## 2022-01-12 DIAGNOSIS — U07.1 COVID-19 VIRUS INFECTION: ICD-10-CM

## 2022-01-12 PROCEDURE — 99212 OFFICE O/P EST SF 10 MIN: CPT

## 2022-01-12 RX ORDER — DEXAMETHASONE 0.5 MG/1
TABLET ORAL
Qty: 21 TABLET | Refills: 0 | Status: SHIPPED | OUTPATIENT
Start: 2022-01-12 | End: 2022-02-09

## 2022-01-12 RX ORDER — CEFDINIR 300 MG/1
300 CAPSULE ORAL 2 TIMES DAILY
Qty: 14 CAPSULE | Refills: 0 | Status: SHIPPED | OUTPATIENT
Start: 2022-01-12 | End: 2022-01-19

## 2022-01-12 NOTE — PROGRESS NOTES
Acute Office Visit      Patient Name: Leigh Negron  : 1980   MRN: [id number]     Chief Complaint:    Chief Complaint   Patient presents with   • COVID exposure     exposed ; headache, fatigue, cough, SOA, burning chest; took at home test on monday-positive       History of Present Illness: Leigh Negron is a 42 y.o. female who is here today for COVID symptoms.  She states that her symptoms started on  and she took an at-home test on Monday which was positive.  She thinks she was exposed to a coworker who she was near who had COVID on [place] .  That specific coworker also has a 2nd job where he works at a nursing home.  Patient's main complaint is intermittent painful breathing as if her lung is burning, coughing, headache, fatigue, and some ear fullness.  She had a sore throat and sinus pain the 1st day.  She also noted some diarrhea that same day, but denies nausea, vomiting, or abdominal pain.  She states that she has never had a fever, but at times she has felt hot and has had some chills.  She feels that her appetite is excessive.  No change to her sense of taste and smell.  She has been using Mucinex, Tylenol, and ZzzQuil.    Subjective     Review of System: Review of Systems   Constitutional: Positive for chills. Negative for appetite change, fatigue and fever.   HENT: Positive for ear pain and rhinorrhea. Negative for congestion, postnasal drip, sinus pressure, sinus pain and sore throat.    Respiratory: Positive for cough, chest tightness and shortness of breath.    Cardiovascular: Negative for chest pain and palpitations.   Gastrointestinal: Positive for diarrhea. Negative for abdominal pain, nausea and vomiting.   Genitourinary: Negative for dysuria and flank pain.   Musculoskeletal: Negative for arthralgias and joint swelling.   Neurological: Positive for headaches. Negative for dizziness.      I have reviewed the ROS documented by my clinical staff, updated  appropriately and I agree. STEFFANIE Klein    Past Medical History:   Past Medical History:   Diagnosis Date   • ADHD (attention deficit hyperactivity disorder) 10/2002   • Allergic 2002    Had prick test done several years ago. Didn't explain sympto   • Anxiety 10/2006    I have a stressful job   • Exposure to sexually transmitted disease (STD)    • History of anemia    • History of body piercing    • History of ECG 2016   • History of hypertension    • History of kidney infection    • History of migraine    • Low back pain 2016    MRI showed bulging discs at L4 & L5. 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   • Visual impairment 2002    I wear glasses       Past Surgical History:   Past Surgical History:   Procedure Laterality Date   • BREAST SURGERY  2016    Breast augmentation   •  SECTION     • COSMETIC SURGERY  2016    Breast augmentation   • FOREARM FRACTURE SURGERY     • UMBILICAL HERNIA REPAIR     • WISDOM TOOTH EXTRACTION         Family History:   Family History   Problem Relation Age of Onset   • Hyperlipidemia Mother    • Obesity Mother    • Osteoporosis Mother    • Arthritis Father    • Hyperlipidemia Father    • Hypertension Father    • Mental illness Father    • Migraines Father    • Obesity Father    • Arthritis Other    • Arthritis Paternal Uncle         Rheumatoid arthritis       Social History:   Social History     Socioeconomic History   • Marital status:    Tobacco Use   • Smoking status: Former Smoker     Packs/day: 0.50     Years: 2.00     Pack years: 1.00     Types: Cigarettes     Start date: 1998     Quit date: 2000     Years since quittin.4   • Smokeless tobacco: Never Used   • Tobacco comment: Not sure when i quit exactly. I never felt particularly addicted so i just quit.   Substance and Sexual Activity   • Alcohol use: Yes     Types: 2  "Glasses of wine, 2 Cans of beer, 2 Shots of liquor per week     Comment: Occasional alcohol use   • Drug use: No   • Sexual activity: Yes     Partners: Male     Birth control/protection: I.U.D.       Medications:     Current Outpatient Medications:   •  acetaminophen (Tylenol 8 Hour) 650 MG 8 hr tablet, Take 1 tablet by mouth Every 8 (Eight) Hours As Needed for Mild Pain ., Disp: 12 tablet, Rfl: 0  •  amphetamine-dextroamphetamine (ADDERALL) 10 MG tablet, Take  by mouth daily., Disp: , Rfl:   •  cyclobenzaprine (FLEXERIL) 5 MG tablet, Take 1 tablet by mouth At Night As Needed for Muscle Spasms., Disp: 7 tablet, Rfl: 0  •  fluticasone (FLONASE) 50 MCG/ACT nasal spray, , Disp: , Rfl:   •  lisdexamfetamine (VYVANSE) 50 MG capsule, Take  by mouth daily., Disp: , Rfl:   •  valACYclovir (VALTREX) 500 MG tablet, Take 1 tablet by mouth Daily., Disp: 30 tablet, Rfl: 5  •  cefdinir (OMNICEF) 300 MG capsule, Take 1 capsule by mouth 2 (Two) Times a Day for 7 days., Disp: 14 capsule, Rfl: 0  •  dexamethasone (DECADRON) 0.5 MG tablet, 6 po x1d; then 5 po x 1d; then 4 po x 1d; then 3 po x 1d; then 2 po x 1d; then 1 po x1d; then STOP, Disp: 21 tablet, Rfl: 0    Allergies:   Allergies   Allergen Reactions   • Augmentin [Amoxicillin-Pot Clavulanate]    • Bactrim [Sulfamethoxazole-Trimethoprim]        Objective     Physical Exam:   Vital Signs:   Vitals:    01/12/22 1707   BP: 142/92   Pulse: 78   Temp: 98.4 °F (36.9 °C)   SpO2: 98%   Weight: 77.9 kg (171 lb 12.8 oz)   Height: 175.3 cm (69\")     Body mass index is 25.37 kg/m².     Physical Exam  Vitals and nursing note reviewed.   Constitutional:       Appearance: Normal appearance.   HENT:      Head: Normocephalic and atraumatic.      Right Ear: Ear canal and external ear normal. A middle ear effusion is present. Tympanic membrane is not erythematous or bulging.      Left Ear: Ear canal and external ear normal. A middle ear effusion is present. Tympanic membrane is erythematous and " bulging.      Nose: Nose normal.      Mouth/Throat:      Mouth: Mucous membranes are moist.      Pharynx: Oropharynx is clear. Uvula midline. Posterior oropharyngeal erythema present.   Eyes:      Extraocular Movements: Extraocular movements intact.      Conjunctiva/sclera: Conjunctivae normal.      Pupils: Pupils are equal, round, and reactive to light.   Neck:      Vascular: No carotid bruit.   Cardiovascular:      Rate and Rhythm: Normal rate and regular rhythm.      Pulses: Normal pulses.      Heart sounds: Normal heart sounds.   Pulmonary:      Effort: Pulmonary effort is normal.      Breath sounds: Normal breath sounds.   Abdominal:      General: Abdomen is flat. Bowel sounds are normal. There is no distension.      Palpations: Abdomen is soft.      Tenderness: There is no abdominal tenderness.   Musculoskeletal:      Cervical back: Neck supple. No tenderness.      Right lower leg: No edema.      Left lower leg: No edema.   Lymphadenopathy:      Head:      Left side of head: Tonsillar adenopathy present.      Cervical: No cervical adenopathy.   Skin:     General: Skin is warm and dry.      Capillary Refill: Capillary refill takes less than 2 seconds.   Neurological:      General: No focal deficit present.      Mental Status: She is alert and oriented to person, place, and time.   Psychiatric:         Mood and Affect: Mood normal.         Behavior: Behavior normal.         Assessment / Plan      Assessment/Plan:   Diagnoses and all orders for this visit:    1. Non-recurrent acute serous otitis media of left ear (Primary)  -     cefdinir (OMNICEF) 300 MG capsule; Take 1 capsule by mouth 2 (Two) Times a Day for 7 days.  Dispense: 14 capsule; Refill: 0    2. COVID-19 virus infection  -     dexamethasone (DECADRON) 0.5 MG tablet; 6 po x1d; then 5 po x 1d; then 4 po x 1d; then 3 po x 1d; then 2 po x 1d; then 1 po x1d; then STOP  Dispense: 21 tablet; Refill: 0         1. Patient stated that she did not need to be  reswabbed for COVID to have a confirmation test since she did an at home test.  2. We will treat her respiratory symptoms with Decadron Dosepak.  She also has a left otitis media that will be treated with cefdinir for 7 days.  3. She understands that she will quarantine at home for 5 days from the beginning of symptom onset.  This would have her out of quarantine on Saturday.      Follow Up:   Return if symptoms worsen or fail to improve.    I spent approximately 15 minutes providing clinical care for this patient; including review of patient's chart and provider documentation, face to face time spent with patient in examination room (obtaining history, performing physical exam, discussing diagnosis and management options), placing orders, and completing patient documentation.     STEFFANIE Klein  [facility] [place]

## 2022-01-12 NOTE — E-VISIT ESCALATED
Chief Complaint: Coronavirus (COVID-19), cold, sinus pain, allergy, or flu   Patient was shown the following escalation message:   Seek emergency medical care   Severe shortness of breath needs immediate, in-person medical attention.   Go to your nearest emergency room (ER)! Call first and let them know you're having severe shortness of breath. If you can't get to an ER, call 911. Tell the  you're having severe shortness of breath.   ----------   Patient Interview Transcript:   Why are you getting care through eVisit today? We can't guarantee a specific treatment or test. Your provider will decide what's best for you. Select all that apply.    I want to know if I need to be seen by a provider   Not selected:    I want a specific treatment or medication    I want to know if I have a cold or something more serious    I need a doctor's note    I want to be tested for COVID-19    I want to get the COVID-19 vaccine    I think I'm having side effects from the COVID-19 vaccine    I just want to feel better!    None of the above   Which of these symptoms are bothering you? Select all that apply.    Cough    Shortness of breath    Stuffed-up nose or sinuses    Sore throat    Hoarse voice or loss of voice    Headache    Fatigue or tiredness   Not selected:    Fever    Runny nose    Itchy or watery eyes    Itchy nose or sneezing    Loss of smell or taste    Sweats    Chills    Muscle or body aches    Nausea or vomiting    Diarrhea    I don't have any of these symptoms   Before we learn more about why you're here, we'll get some information related to COVID-19. We'll ask about risk factors, testing, vaccination status, vaccine injection site symptoms, and exposure. Do you have any of these conditions? If so, you may be at increased risk for complications from COVID-19. Select all that apply.    None of the above   Not selected:    Chronic lung disease, such as cystic fibrosis or interstitial fibrosis    Heart  disease, such as congenital heart disease, congestive heart failure, or coronary artery disease    Disorder of the brain, spinal cord, or nerves and muscles, such as dementia, cerebral palsy, epilepsy, muscular dystrophy, or developmental delay    Metabolic disorder or mitochondrial disease    Cerebrovascular disease, such as stroke or another condition affecting the blood vessels or blood supply to the brain   Do you live in a group care setting? Examples include: - Nursing home - Residential care - Psychiatric treatment facility - [facility] [place] [facility] - Homeless shelter - Foster care setting Select one.    No   Not selected:    Yes   Have you ever been tested for COVID-19? Select one.    Yes   Not selected:    No   When was your most recent COVID-19 test? Select one.    Within the last week   Not selected:    7 to 14 days ago    15 to 30 days ago    1 to 3 months ago    More than 3 months ago   What type of COVID-19 test did you have? There are 2 types of COVID-19 tests: - Viral tests check if you're currently infected with COVID-19. - Antibody tests check if you've been infected in the past. Select one.    Viral test for current infection   Not selected:    Antibody test for past infection   What was the result of your most recent COVID-19 test? Select one.    Positive (signs of current or past infection)   Not selected:    Negative (no sign of infection)    I'm not sure   Have you gotten the COVID-19 vaccine? Select one.    Yes   Not selected:    No   How many doses of the COVID-19 vaccine have you gotten? This includes boosters. Select one.    2 doses   Not selected:    1 dose    3 doses   Which COVID-19 vaccine did you get for your first dose? Check your Vaccination Record Card under Product Name/. Select one.    Moderna   Not selected:    Stefano & Stefano's Matilda Vaccine (J&J/Matilda)    Pfizer-BioNTFarman (Pfizer)   Which COVID-19 vaccine did you get for your second dose?  "Check your Vaccination Record Card under Product Name/. Select one.    Moderna   Not selected:    Stefano & Stefano's Matilda Vaccine (J&J/Matilda)    Pfizer-BioNTech (Pfizer)   When did you get your most recent dose of the COVID-19 vaccine?    More than 14 days ago   Not selected:    Less than 48 hours (2 days) ago    48 to 72 hours (3 days) ago    3 to 5 days ago    5 to 7 days ago    7 to 14 days ago   In the last 14 days, have you traveled outside of your local community? This includes travel by car, RV, bus, train, or plane. Travel increases your chances of getting and spreading COVID-19. Select one.    No   Not selected:    Yes   In the last 14 days, have you had close contact with someone who has coronavirus (COVID-19)? \"Close contact\" means any of these: - Living in the same household as someone with COVID-19. - Caring for someone with COVID-19. - Being within 6 feet of someone with COVID-19 for a total of at least 15 minutes over a 24-hour period. For example, three 5-minute exposures for a total of 15 minutes. - Being in direct contact with respiratory droplets from someone with COVID-19 (being coughed on, kissing, sharing utensils). Select one.    Yes, a confirmed case   Not selected:    Yes, a suspected case    No, not that I know of   When did the close contact happen? Select one.    More than 7 days ago   Not selected:    Within the last 2 days    3 to 7 days ago   Thanks for completing our COVID-19 questions. Now we'll return to your symptoms. When did your symptoms start? If you know the exact date your symptoms started, choose Other and enter the month and day. Select one.    3 to 5 days ago   Not selected:    Less than 48 hours ago    6 to 9 days ago    10 to 14 days ago    2 to 4 weeks ago    More than a month ago    Other (specify)   Did your symptoms come on suddenly or gradually? Select one.    Gradually   Not selected:    Suddenly    I'm not sure   How would you describe your " "shortness of breath? Sometimes shortness of breath can be a sign of a more serious condition. Select one.    Moderate (it's bad, but I can still do simple things like get dressed, bathe, or comb my hair)   Not selected:    Mild (about what I normally have with a cold)    Severe (it's so bad that I can't do simple things like get dressed, bathe, or comb my hair)   We'd like to find out more about your shortness of breath. Try to say the following sentence out loud: \"I went to the store today to buy bread, milk, and eggs.\" Are you able to get to the end of the sentence without stopping for breath? If not, you may need emergency care.    Yes   Not selected:    No   Are you able to take a full, deep breath? A full, deep breath means inhaling for 3 to 4 seconds. If you can't do this, you may need to seek emergency care. Select one.    No   Not selected:    Yes   ----------   Medical history   Medical history data does not currently exist for this patient.    "

## 2022-02-09 ENCOUNTER — OFFICE VISIT (OUTPATIENT)
Dept: FAMILY MEDICINE CLINIC | Facility: CLINIC | Age: 42
End: 2022-02-09

## 2022-02-09 VITALS
TEMPERATURE: 97.4 F | HEIGHT: 69 IN | OXYGEN SATURATION: 99 % | SYSTOLIC BLOOD PRESSURE: 130 MMHG | HEART RATE: 73 BPM | DIASTOLIC BLOOD PRESSURE: 80 MMHG | WEIGHT: 175 LBS | BODY MASS INDEX: 25.92 KG/M2

## 2022-02-09 DIAGNOSIS — E34.9 ABNORMALITY OF HORMONE: ICD-10-CM

## 2022-02-09 DIAGNOSIS — U07.1 COVID-19 VIRUS INFECTION: Primary | ICD-10-CM

## 2022-02-09 DIAGNOSIS — I49.1 PAC (PREMATURE ATRIAL CONTRACTION): ICD-10-CM

## 2022-02-09 DIAGNOSIS — R00.2 PALPITATIONS: ICD-10-CM

## 2022-02-09 DIAGNOSIS — R53.82 CHRONIC FATIGUE: ICD-10-CM

## 2022-02-09 PROCEDURE — 93000 ELECTROCARDIOGRAM COMPLETE: CPT | Performed by: FAMILY MEDICINE

## 2022-02-09 PROCEDURE — 99214 OFFICE O/P EST MOD 30 MIN: CPT | Performed by: FAMILY MEDICINE

## 2022-02-09 NOTE — PROGRESS NOTES
"Subjective   Crystal Eveline Negron is a 42 y.o. female.     History of Present Illness  Answers for HPI/ROS submitted by the patient on 2/2/2022  Please describe your symptoms.: Since having COVID I've noticed a lot of irregular-feeling heart beats. It feels like my heart is backfiring like an old truck and when it happens i noticed i don't have a corresponding pulse. I've felt that before but only after too much Adderall and caffeine.  Have you had these symptoms before?: Yes  How long have you been having these symptoms?: 1-2 weeks  What is the primary reason for your visit?: Other    Mrs. Jara presents today with concern regarding palpitations, skipped heartbeats.  She was diagnosed with Covid little over 2 weeks ago.  Symptoms began at that time.  She denies leslie chest pain, some vague chest tightness.  No leslie shortness of breath, no wheeze no hemoptysis, no dizziness.  She notices symptoms when lying down.  She continues to have chronic fatigue related to her recent Covid illness.    She would also like \"hormones checked\".  Has taken hormone supplementation in the past.    The following portions of the patient's history were reviewed and updated as appropriate: allergies, current medications, past family history, past medical history, past social history, past surgical history and problem list.    Review of Systems   Constitutional: Positive for fatigue. Negative for fever.   HENT: Positive for congestion and postnasal drip. Negative for mouth sores, nosebleeds, rhinorrhea, sinus pain, sore throat and trouble swallowing.    Eyes: Negative for pain, discharge and redness.   Respiratory: Positive for chest tightness. Negative for cough, shortness of breath and wheezing.    Cardiovascular: Positive for palpitations. Negative for chest pain and leg swelling.   Gastrointestinal: Negative for abdominal pain, blood in stool, diarrhea, nausea and vomiting.   Genitourinary: Negative for dysuria and hematuria. "   Musculoskeletal: Positive for arthralgias and myalgias.   Skin: Negative for rash and wound.   Neurological: Positive for weakness and headaches. Negative for dizziness, tremors, syncope and speech difficulty.   Hematological: Negative for adenopathy. Does not bruise/bleed easily.   Psychiatric/Behavioral: Negative for confusion. The patient is nervous/anxious.        Objective    Vitals:    02/09/22 1453   BP: 130/80   Pulse: 73   Temp: 97.4 °F (36.3 °C)   SpO2: 99%     Body mass index is 25.84 kg/m².      02/09/22  1453   Weight: 79.4 kg (175 lb)       Physical Exam  Vitals and nursing note reviewed.   Constitutional:       General: She is not in acute distress.     Appearance: She is well-developed, well-groomed and normal weight. She is ill-appearing (mildly).   HENT:      Head: Normocephalic and atraumatic.      Right Ear: Tympanic membrane, ear canal and external ear normal.      Left Ear: Tympanic membrane, ear canal and external ear normal.      Nose: Congestion present. No rhinorrhea.      Mouth/Throat:      Mouth: Mucous membranes are moist.      Pharynx: Oropharynx is clear.   Eyes:      Conjunctiva/sclera: Conjunctivae normal.   Neck:      Thyroid: No thyroid mass.   Cardiovascular:      Rate and Rhythm: Normal rate and regular rhythm.  No extrasystoles are present.     Pulses: Normal pulses.      Heart sounds: Normal heart sounds.   Pulmonary:      Effort: Pulmonary effort is normal.      Breath sounds: Normal breath sounds.   Abdominal:      General: Bowel sounds are normal. There is no distension.      Palpations: Abdomen is soft. There is no hepatomegaly, splenomegaly or mass.      Tenderness: There is no abdominal tenderness.   Musculoskeletal:      Right lower leg: No edema.      Left lower leg: No edema.   Lymphadenopathy:      Cervical: No cervical adenopathy.   Skin:     General: Skin is warm.      Coloration: Skin is not jaundiced or pale.      Findings: No rash.   Neurological:      Mental  Status: She is alert and oriented to person, place, and time.      Sensory: Sensation is intact.      Motor: Motor function is intact. No tremor.      Gait: Gait is intact.   Psychiatric:         Mood and Affect: Mood and affect normal.         Behavior: Behavior normal. Behavior is cooperative.         Cognition and Memory: Cognition normal.           ECG 12 Lead    Date/Time: 2/9/2022 3:28 PM  Performed by: Marlen Navarrete MD  Authorized by: Marlen Navarrete MD   Comparison: compared with previous ECG from 11/2/2020  Comparison to previous ECG: No ectopy of previous ECG  Rhythm: sinus rhythm  Ectopy: atrial premature contractions  Ectopy comments: occasional  Rate: normal  BPM: 72  Conduction: conduction normal  Conduction: non-specific intraventricular conduction delay  Conduction comments: Possible RVCD  ST Segments: ST segments normal  T Waves: T waves normal  QRS axis: normal  Other: no other findings    Clinical impression: abnormal EKG and non-specific ECG  Comments:   OK int: 126 ms  QRS dur: 93 ms  QTc: 411 ms              Assessment/Plan   Diagnoses and all orders for this visit:    1. COVID-19 virus infection (Primary)  -     CBC & Differential  -     Comprehensive Metabolic Panel    2. PAC (premature atrial contraction)  -     Comprehensive Metabolic Panel  -     Magnesium  -     TSH Rfx On Abnormal To Free T4  -     ECG 12 Lead    3. Palpitations  -     CBC & Differential  -     Comprehensive Metabolic Panel  -     Magnesium  -     TSH Rfx On Abnormal To Free T4  -     ECG 12 Lead    4. Chronic fatigue  -     CBC & Differential  -     Comprehensive Metabolic Panel  -     Magnesium  -     Iron  -     TSH Rfx On Abnormal To Free T4    5. Abnormality of hormone  -     Estradiol  -     Progesterone  -     Testosterone       Recent Covid infection with subsequent palpitations likely consistent with premature atrial contraction seen on EKG.  We discussed that this is not an unusual finding in patients with  recent Covid infections.  She is clinically stable, vital stable.  Lab eval as above to eliminate contributing factors.  Encouraged good hydration, use of electrolyte supplementation for several more days.  Continue adequate rest, good nutrition.  Avoid caffeine, stimulants.    Hormonal eval as above per patient request.    Follow-up per routine, sooner as needed/instructed.  I will contact patient regarding test results and provide instructions regarding any necessary changes in plan of care.  Patient was encouraged to keep me informed of any acute changes, lack of improvement, or any new concerning symptoms.  Pt is aware of reasons to seek emergent care.  Patient voiced understanding of all instructions and denied further questions.    Please note that portions of this note may have been completed with a voice recognition program. Efforts were made to edit the dictations, but occasionally words are mistranscribed.

## 2022-02-10 LAB
ALBUMIN SERPL-MCNC: 4.4 G/DL (ref 3.5–5.2)
ALBUMIN/GLOB SERPL: 2 G/DL
ALP SERPL-CCNC: 78 U/L (ref 39–117)
ALT SERPL-CCNC: 10 U/L (ref 1–33)
AST SERPL-CCNC: 13 U/L (ref 1–32)
BASOPHILS # BLD AUTO: 0.07 10*3/MM3 (ref 0–0.2)
BASOPHILS NFR BLD AUTO: 1.1 % (ref 0–1.5)
BILIRUB SERPL-MCNC: 0.2 MG/DL (ref 0–1.2)
BUN SERPL-MCNC: 10 MG/DL (ref 6–20)
BUN/CREAT SERPL: 13.9 (ref 7–25)
CALCIUM SERPL-MCNC: 9.5 MG/DL (ref 8.6–10.5)
CHLORIDE SERPL-SCNC: 103 MMOL/L (ref 98–107)
CO2 SERPL-SCNC: 26.8 MMOL/L (ref 22–29)
CREAT SERPL-MCNC: 0.72 MG/DL (ref 0.57–1)
EOSINOPHIL # BLD AUTO: 0.13 10*3/MM3 (ref 0–0.4)
EOSINOPHIL NFR BLD AUTO: 2.1 % (ref 0.3–6.2)
ERYTHROCYTE [DISTWIDTH] IN BLOOD BY AUTOMATED COUNT: 12.3 % (ref 12.3–15.4)
ESTRADIOL SERPL-MCNC: 65.1 PG/ML
GLOBULIN SER CALC-MCNC: 2.2 GM/DL
GLUCOSE SERPL-MCNC: 84 MG/DL (ref 65–99)
HCT VFR BLD AUTO: 43.4 % (ref 34–46.6)
HGB BLD-MCNC: 13.8 G/DL (ref 12–15.9)
IMM GRANULOCYTES # BLD AUTO: 0.02 10*3/MM3 (ref 0–0.05)
IMM GRANULOCYTES NFR BLD AUTO: 0.3 % (ref 0–0.5)
IRON SERPL-MCNC: 33 MCG/DL (ref 37–145)
LYMPHOCYTES # BLD AUTO: 1.44 10*3/MM3 (ref 0.7–3.1)
LYMPHOCYTES NFR BLD AUTO: 23 % (ref 19.6–45.3)
MAGNESIUM SERPL-MCNC: 2.2 MG/DL (ref 1.6–2.6)
MCH RBC QN AUTO: 29.6 PG (ref 26.6–33)
MCHC RBC AUTO-ENTMCNC: 31.8 G/DL (ref 31.5–35.7)
MCV RBC AUTO: 93.1 FL (ref 79–97)
MONOCYTES # BLD AUTO: 0.4 10*3/MM3 (ref 0.1–0.9)
MONOCYTES NFR BLD AUTO: 6.4 % (ref 5–12)
NEUTROPHILS # BLD AUTO: 4.19 10*3/MM3 (ref 1.7–7)
NEUTROPHILS NFR BLD AUTO: 67.1 % (ref 42.7–76)
NRBC BLD AUTO-RTO: 0 /100 WBC (ref 0–0.2)
PLATELET # BLD AUTO: 334 10*3/MM3 (ref 140–450)
POTASSIUM SERPL-SCNC: 4.8 MMOL/L (ref 3.5–5.2)
PROGEST SERPL-MCNC: 0.2 NG/ML
PROT SERPL-MCNC: 6.6 G/DL (ref 6–8.5)
RBC # BLD AUTO: 4.66 10*6/MM3 (ref 3.77–5.28)
SODIUM SERPL-SCNC: 142 MMOL/L (ref 136–145)
TESTOST SERPL-MCNC: 9 NG/DL (ref 4–50)
TSH SERPL DL<=0.005 MIU/L-ACNC: 0.96 UIU/ML (ref 0.27–4.2)
WBC # BLD AUTO: 6.25 10*3/MM3 (ref 3.4–10.8)

## 2022-03-07 ENCOUNTER — OFFICE VISIT (OUTPATIENT)
Dept: FAMILY MEDICINE CLINIC | Facility: CLINIC | Age: 42
End: 2022-03-07

## 2022-03-07 ENCOUNTER — E-VISIT (OUTPATIENT)
Dept: ADMINISTRATIVE | Facility: OTHER | Age: 42
End: 2022-03-07

## 2022-03-07 VITALS
HEIGHT: 69 IN | BODY MASS INDEX: 26.16 KG/M2 | HEART RATE: 57 BPM | DIASTOLIC BLOOD PRESSURE: 85 MMHG | TEMPERATURE: 97.6 F | SYSTOLIC BLOOD PRESSURE: 130 MMHG | OXYGEN SATURATION: 100 % | WEIGHT: 176.6 LBS

## 2022-03-07 DIAGNOSIS — N89.8 VAGINAL DISCHARGE: Primary | ICD-10-CM

## 2022-03-07 DIAGNOSIS — R30.0 DYSURIA: ICD-10-CM

## 2022-03-07 LAB
BILIRUB BLD-MCNC: NEGATIVE MG/DL
CLARITY, POC: CLEAR
COLOR UR: YELLOW
EXPIRATION DATE: NORMAL
GLUCOSE UR STRIP-MCNC: NEGATIVE MG/DL
KETONES UR QL: NEGATIVE
LEUKOCYTE EST, POC: NEGATIVE
Lab: NORMAL
NITRITE UR-MCNC: NEGATIVE MG/ML
PH UR: 6 [PH] (ref 5–8)
PROT UR STRIP-MCNC: NEGATIVE MG/DL
RBC # UR STRIP: NEGATIVE /UL
SP GR UR: 1.01 (ref 1–1.03)
UROBILINOGEN UR QL: NORMAL

## 2022-03-07 PROCEDURE — 99213 OFFICE O/P EST LOW 20 MIN: CPT | Performed by: FAMILY MEDICINE

## 2022-03-07 PROCEDURE — 81003 URINALYSIS AUTO W/O SCOPE: CPT | Performed by: FAMILY MEDICINE

## 2022-03-07 NOTE — E-VISIT ESCALATED
Chief Complaint: Yeast infection   Patient was shown the following escalation message:   Some conditions need a visit with a healthcare provider   Green vaginal discharge isn't common with a yeast infection or bacterial vaginosis. Because this suggests a different health problem, you should speak with a provider to get care.   ----------   Patient Interview Transcript:   Which of these symptoms are bothering you? Scroll to see all options. Select all that apply.    Itching around my vagina    Itching in my vagina    Burning around my vagina    Burning in my vagina    Vaginal discharge that looks different than usual   Not selected:    Fishy-smelling vaginal discharge    Pain during sex    Burning with urination   How long have you had these symptoms? Select one.    1 to 3 days   Not selected:    Less than 24 hours    4 to 7 days    More than 7 days   How would you describe your vaginal discharge? Select one.    None of the above   Not selected:    Thick and clumpy, like cottage cheese    Thin    Frothy or foamy   What color is your vaginal discharge? Select one.    Green   Not selected:    White    Gray or off-white    Yellow    None of the above   ----------   Medical history   Medical history data does not currently exist for this patient.

## 2022-03-07 NOTE — PROGRESS NOTES
Subjective   Leigh Negron is a 42 y.o. female.     History of Present Illness   Vaginitis: Patient complains of an abnormal vaginal discharge for 2 days. Vaginal symptoms include burning, discharge described as white, creamy and green and urinary symptoms of dysuria.Vulvar symptoms include none.STI Risk: Possible STD exposure. Menstrual pattern: She had been bleeding generally regular. Had 2 cycles in Jan, skipped Feb, had cycle early March. Contraception: barrier    Using menstrual cup.    Currently monogamous no relationship.      The following portions of the patient's history were reviewed and updated as appropriate: allergies, current medications, past family history, past medical history, past social history, past surgical history and problem list.    Review of Systems   Constitutional: Negative for chills and fever.   HENT: Negative for sore throat.    Eyes: Negative for pain, discharge and redness.   Respiratory: Negative for cough.    Cardiovascular: Negative for chest pain.   Gastrointestinal: Positive for abdominal pain. Negative for constipation, diarrhea, nausea and vomiting.   Genitourinary: Positive for frequency, pelvic pain and vaginal discharge. Negative for dysuria, flank pain, hematuria and urgency.   Musculoskeletal: Positive for back pain.   Skin: Negative for rash.   Neurological: Positive for headaches.   Hematological: Negative for adenopathy.   Psychiatric/Behavioral: Negative for confusion.       Objective    Vitals:    03/07/22 1507   BP: 130/85   Pulse: 57   Temp: 97.6 °F (36.4 °C)   SpO2: 100%     Body mass index is 26.08 kg/m².      03/07/22  1507   Weight: 80.1 kg (176 lb 9.6 oz)       Physical Exam  Vitals and nursing note reviewed.   Constitutional:       General: She is not in acute distress.     Appearance: She is well-developed and well-groomed. She is not ill-appearing.   Cardiovascular:      Rate and Rhythm: Normal rate and regular rhythm.      Pulses: Normal pulses.       Heart sounds: Normal heart sounds.   Pulmonary:      Effort: Pulmonary effort is normal.      Breath sounds: Normal breath sounds.   Abdominal:      General: Bowel sounds are normal. There is no distension.      Palpations: Abdomen is soft. There is no hepatomegaly, splenomegaly or mass.      Tenderness: There is no abdominal tenderness.   Skin:     General: Skin is warm and dry.      Coloration: Skin is not jaundiced or pale.      Findings: No rash.   Neurological:      Mental Status: She is alert and oriented to person, place, and time.      Gait: Gait is intact.   Psychiatric:         Behavior: Behavior normal. Behavior is cooperative.         Cognition and Memory: Cognition normal.       Brief Urine Lab Results  (Last result in the past 365 days)      Color   Clarity   Blood   Leuk Est   Nitrite   Protein   CREAT   Urine HCG        03/07/22 1624 Yellow   Clear   Negative   Negative   Negative   Negative                 Assessment/Plan   Diagnoses and all orders for this visit:    1. Vaginal discharge (Primary)  -     NuSwab VG+ - Swab, Vagina  -     POC Urinalysis Dipstick, Automated    2. Dysuria       Urinalysis normal.  New swab pending.  She is not acutely ill, not in significant pain she is amenable to holding off on antibiotics until new swab results known as she desires targeted treatment.    Encouraged to schedule routine annual physical as she is overdue.    I will contact patient regarding test results and provide instructions regarding any necessary changes in plan of care.  Patient was encouraged to keep me informed of any acute changes, lack of improvement, or any new concerning symptoms.  Pt is aware of reasons to seek emergent care.  Patient voiced understanding of all instructions and denied further questions.    Please note that portions of this note may have been completed with a voice recognition program. Efforts were made to edit the dictations, but occasionally words are  mistranscribed.

## 2022-03-09 ENCOUNTER — TELEPHONE (OUTPATIENT)
Dept: FAMILY MEDICINE CLINIC | Facility: CLINIC | Age: 42
End: 2022-03-09

## 2022-03-09 NOTE — TELEPHONE ENCOUNTER
Caller: Leigh Negron    Relationship: Self    Best call back number: [phone]    Caller requesting test results: SELF    What test was performed: VAGINAL SWAB     When was the test performed: 3/7/22    Where was the test performed: IN OFFICE    Additional notes: PLEASE CALL WITH RESULTS- HAS BEEN WATCHING [employer] BUT HAS NOT SEEN RESULTS YET.

## 2022-03-11 LAB
A VAGINAE DNA VAG QL NAA+PROBE: ABNORMAL SCORE
BVAB2 DNA VAG QL NAA+PROBE: ABNORMAL SCORE
C ALBICANS DNA VAG QL NAA+PROBE: POSITIVE
C GLABRATA DNA VAG QL NAA+PROBE: NEGATIVE
C TRACH DNA VAG QL NAA+PROBE: NEGATIVE
MEGA1 DNA VAG QL NAA+PROBE: ABNORMAL SCORE
N GONORRHOEA DNA VAG QL NAA+PROBE: NEGATIVE
T VAGINALIS DNA VAG QL NAA+PROBE: NEGATIVE

## 2022-03-11 RX ORDER — FLUCONAZOLE 150 MG/1
TABLET ORAL
Qty: 2 TABLET | Refills: 0 | Status: SHIPPED | OUTPATIENT
Start: 2022-03-11 | End: 2022-03-16

## 2022-03-16 ENCOUNTER — OFFICE VISIT (OUTPATIENT)
Dept: FAMILY MEDICINE CLINIC | Facility: CLINIC | Age: 42
End: 2022-03-16

## 2022-03-16 VITALS
TEMPERATURE: 96.9 F | OXYGEN SATURATION: 98 % | HEIGHT: 69 IN | DIASTOLIC BLOOD PRESSURE: 72 MMHG | HEART RATE: 72 BPM | WEIGHT: 177 LBS | BODY MASS INDEX: 26.22 KG/M2 | SYSTOLIC BLOOD PRESSURE: 120 MMHG

## 2022-03-16 DIAGNOSIS — Z13.220 SCREENING FOR LIPID DISORDERS: ICD-10-CM

## 2022-03-16 DIAGNOSIS — Z12.4 ENCOUNTER FOR PAPANICOLAOU SMEAR FOR CERVICAL CANCER SCREENING: ICD-10-CM

## 2022-03-16 DIAGNOSIS — Z12.31 ENCOUNTER FOR SCREENING MAMMOGRAM FOR MALIGNANT NEOPLASM OF BREAST: ICD-10-CM

## 2022-03-16 DIAGNOSIS — Z01.419 WELL WOMAN EXAM WITH ROUTINE GYNECOLOGICAL EXAM: Primary | ICD-10-CM

## 2022-03-16 DIAGNOSIS — Z30.432 ENCOUNTER FOR IUD REMOVAL: ICD-10-CM

## 2022-03-16 DIAGNOSIS — Z13.1 SCREENING FOR DIABETES MELLITUS: ICD-10-CM

## 2022-03-16 DIAGNOSIS — M54.59 MECHANICAL LOW BACK PAIN: ICD-10-CM

## 2022-03-16 DIAGNOSIS — Z11.51 SCREENING FOR HPV (HUMAN PAPILLOMAVIRUS): ICD-10-CM

## 2022-03-16 DIAGNOSIS — Z30.430 ENCOUNTER FOR INSERTION OF INTRAUTERINE CONTRACEPTIVE DEVICE (IUD): ICD-10-CM

## 2022-03-16 PROCEDURE — 58301 REMOVE INTRAUTERINE DEVICE: CPT | Performed by: FAMILY MEDICINE

## 2022-03-16 PROCEDURE — 99396 PREV VISIT EST AGE 40-64: CPT | Performed by: FAMILY MEDICINE

## 2022-03-16 RX ORDER — CYCLOBENZAPRINE HCL 5 MG
5 TABLET ORAL NIGHTLY PRN
Qty: 30 TABLET | Refills: 0 | Status: SHIPPED | OUTPATIENT
Start: 2022-03-16

## 2022-03-16 RX ORDER — VALACYCLOVIR HYDROCHLORIDE 500 MG/1
500 TABLET, FILM COATED ORAL DAILY
Qty: 30 TABLET | Refills: 5 | Status: SHIPPED | OUTPATIENT
Start: 2022-03-16

## 2022-03-16 NOTE — PROGRESS NOTES
Subjective   Leigh Negron is a 42 y.o. female.     History of Present Illness  Mrs. Jara presents today for annual checkup. LNMP approx 3 weeks ago.       status post  2009  IUD parguard placed 2010. Wishes to have that taken out today.  Last Pap smear 2018 normal with negative high-risk HPV testing.  Distant history of abnormal Pap smear in .      She is a former smoker.  Smoked approximately 1 pack/day for 2 years. Quit in the year . Currently using E cig     Endorses moderate alcohol intake.  Getting irregular exercise. She is wearing her seatbelt appropriately.  Has a firearm in home.  She is up-to-date on vaccinations.  Last dental checkup 6 months ago.     She is sexually active.  Has had 1 sexual partner in the past several months. Endorses 40? sexual partners in her lifetime. Previous STD screening negative. Recent GC/Chlamydia screen and NuSwab neg other than for candida. On valtrex for recurrent gen herpes. Denies genital lesion, vaginal discharge or pelvic pain.     Requests refill of flexeril for intermittent LBP.    Has not yet had baseline mammogram.    Pt's previous HPI reviewed and updated as indicated.     The following portions of the patient's history were reviewed and updated as appropriate: allergies, current medications, past family history, past medical history, past social history, past surgical history and problem list.    Review of Systems   Constitutional: Negative for fatigue, fever and unexpected weight change.   HENT: Negative for congestion, mouth sores, rhinorrhea, sore throat and trouble swallowing.    Eyes: Negative for visual disturbance.   Respiratory: Negative for cough, shortness of breath and wheezing.    Cardiovascular: Negative for chest pain, palpitations and leg swelling.   Gastrointestinal: Negative for abdominal pain, blood in stool, constipation, diarrhea and vomiting.   Endocrine: Negative for polydipsia and polyuria.   Genitourinary:  Negative for dyspareunia, dysuria, frequency, genital sores, hematuria, menstrual problem, pelvic pain, urgency and vaginal discharge.   Musculoskeletal: Positive for arthralgias and myalgias. Negative for back pain.   Skin: Negative for rash and wound.   Neurological: Positive for headaches. Negative for dizziness, tremors, syncope and weakness.   Hematological: Negative for adenopathy. Does not bruise/bleed easily.   Psychiatric/Behavioral: Negative for confusion, dysphoric mood and sleep disturbance. The patient is not nervous/anxious.    Pt's previous ROS reviewed and updated as indicated.       Objective    Vitals:    03/16/22 1047   BP: 120/72   Pulse: 72   Temp: 96.9 °F (36.1 °C)   SpO2: 98%     Body mass index is 26.14 kg/m².      03/16/22  1047   Weight: 80.3 kg (177 lb)       Physical Exam  Exam conducted with a chaperone present.   HENT:      Head: Normocephalic and atraumatic.   Eyes:      General: No scleral icterus.     Conjunctiva/sclera: Conjunctivae normal.   Neck:      Thyroid: No thyroid mass.   Cardiovascular:      Rate and Rhythm: Normal rate and regular rhythm.      Pulses: Normal pulses.      Heart sounds: Normal heart sounds.   Pulmonary:      Effort: Pulmonary effort is normal.      Breath sounds: Normal breath sounds.   Chest:      Chest wall: No deformity.   Breasts: Breasts are symmetrical.      Right: No inverted nipple, mass, nipple discharge, skin change, tenderness or axillary adenopathy.      Left: No inverted nipple, mass, nipple discharge, skin change, tenderness or axillary adenopathy.       Abdominal:      General: Bowel sounds are normal. There is no distension.      Palpations: Abdomen is soft. There is no hepatomegaly, splenomegaly or mass.      Tenderness: There is no abdominal tenderness.   Genitourinary:     Exam position: Supine.      Pubic Area: No rash.       Labia:         Right: No rash, tenderness, lesion or injury.         Left: No rash, tenderness, lesion or injury.        Vagina: Normal. No vaginal discharge or bleeding.      Cervix: No cervical motion tenderness, discharge or friability.      Uterus: Not enlarged and not tender.       Adnexa:         Right: No mass or tenderness.          Left: No mass or tenderness.        Comments: Thin prep pap obtained. IUD string visible.  Musculoskeletal:      Right lower leg: No edema.      Left lower leg: No edema.   Lymphadenopathy:      Cervical: No cervical adenopathy.      Upper Body:      Right upper body: No axillary or pectoral adenopathy.      Left upper body: No axillary or pectoral adenopathy.   Skin:     General: Skin is warm and dry.      Coloration: Skin is not jaundiced or pale.      Findings: No bruising or rash.   Neurological:      Mental Status: She is alert and oriented to person, place, and time.      Gait: Gait is intact.   Psychiatric:         Mood and Affect: Mood and affect normal.         Speech: Speech normal.         Behavior: Behavior is hyperactive (mildly). Behavior is cooperative.         Cognition and Memory: Cognition normal.     Pt's previous physical exam reviewed and updated as indicated.    Lab Results   Component Value Date    WBC 6.25 02/09/2022    HGB 13.8 02/09/2022    HCT 43.4 02/09/2022    MCV 93.1 02/09/2022     02/09/2022       Lab Results   Component Value Date    GLUCOSE 84 02/09/2022    BUN 10 02/09/2022    CREATININE 0.72 02/09/2022    EGFRIFNONA 89 02/09/2022    EGFRIFAFRI 108 02/09/2022    BCR 13.9 02/09/2022    K 4.8 02/09/2022    CO2 26.8 02/09/2022    CALCIUM 9.5 02/09/2022    PROTENTOTREF 6.6 02/09/2022    ALBUMIN 4.40 02/09/2022    LABIL2 2.0 02/09/2022    AST 13 02/09/2022    ALT 10 02/09/2022       Lab Results   Component Value Date    TSH 0.958 02/09/2022       Assessment/Plan   Diagnoses and all orders for this visit:    1. Well woman exam with routine gynecological exam (Primary)    2. Screening for diabetes mellitus  -     Hemoglobin A1c    3. Screening for lipid  disorders  -     Lipid Panel    4. Encounter for IUD removal    5. Mechanical low back pain  -     cyclobenzaprine (FLEXERIL) 5 MG tablet; Take 1 tablet by mouth At Night As Needed for Muscle Spasms.  Dispense: 30 tablet; Refill: 0    6. Encounter for Papanicolaou smear for cervical cancer screening  -     Liquid-based Pap Smear, Screening    7. Screening for HPV (human papillomavirus)  -     Liquid-based Pap Smear, Screening    8. Encounter for insertion of intrauterine contraceptive device (IUD)  -     Ambulatory Referral to Gynecology    9. Encounter for screening mammogram for malignant neoplasm of breast  -     Mammo Screening Digital Tomosynthesis Bilateral With CAD; Future    Other orders  -     valACYclovir (VALTREX) 500 MG tablet; Take 1 tablet by mouth Daily.  Dispense: 30 tablet; Refill: 5       Age appropriate preventive care reviewed including cancer screenings, safety measures, mental health concerns, supplements, prevention of CV disease and DM, etc. Handout provided.    She requests removal of paraguard IUD placed 2010. Requests referral to gynecology for placement of new IUD.    Pt advised to eat a heart healthy diet and get regular aerobic exercise. Nutrition and activity goals reviewed including: mainly water to drink, limit white flour/processed sugar, higher lean protein, high fiber carbs, regular meals, working toward 150 mins cardio per week, resistance training 2x/week.    Continue daily valtrex for mgnt of recurrent gen herpes.    F/u 1 year for annual exam, sooner as needed/instructed.  I will contact patient regarding test results and provide instructions regarding any necessary changes in plan of care.  Patient was encouraged to keep me informed of any acute changes or any new concerning symptoms.  Pt is aware of reasons to seek emergent care.  Patient voiced understanding of all instructions and denied further questions.      PROCEDURE NOTE  Procedure: IUD removal  Informed consent  obtained.  Pt placed in supine position. Vaginal speculum placed.  IUD string visualized at cervical os.  Ringed forceps used to grasp base of IUD string.  Gentle in-line traction applied.   IUD removed intact without difficulty.  Minimal bleeding at cervical os.  Pt tolerated well. C/o mild cramping.  No apparent complications.  Postprocedure care reviewed.  Pt voiced understanding and denied further questions.      Please note that portions of this note may have been completed with a voice recognition program. Efforts were made to edit the dictations, but occasionally words are mistranscribed.

## 2022-03-17 LAB
CHOLEST SERPL-MCNC: 200 MG/DL (ref 0–200)
HBA1C MFR BLD: 5 % (ref 4.8–5.6)
HDLC SERPL-MCNC: 66 MG/DL (ref 40–60)
LDLC SERPL CALC-MCNC: 118 MG/DL (ref 0–100)
TRIGL SERPL-MCNC: 91 MG/DL (ref 0–150)
VLDLC SERPL CALC-MCNC: 16 MG/DL (ref 5–40)

## 2022-03-18 PROBLEM — A60.00 RECURRENT GENITAL HERPES: Status: ACTIVE | Noted: 2022-03-18

## 2022-04-22 ENCOUNTER — OFFICE VISIT (OUTPATIENT)
Dept: OBSTETRICS AND GYNECOLOGY | Facility: CLINIC | Age: 42
End: 2022-04-22

## 2022-04-22 VITALS — BODY MASS INDEX: 26.4 KG/M2 | SYSTOLIC BLOOD PRESSURE: 112 MMHG | WEIGHT: 178.8 LBS | DIASTOLIC BLOOD PRESSURE: 84 MMHG

## 2022-04-22 DIAGNOSIS — Z30.014 ENCOUNTER FOR INITIAL PRESCRIPTION OF INTRAUTERINE CONTRACEPTIVE DEVICE (IUD): Primary | ICD-10-CM

## 2022-04-22 PROCEDURE — 99202 OFFICE O/P NEW SF 15 MIN: CPT | Performed by: OBSTETRICS & GYNECOLOGY

## 2022-04-22 RX ORDER — COPPER 313.4 MG/1
1 INTRAUTERINE DEVICE INTRAUTERINE ONCE
Qty: 1 EACH | Refills: 0 | Status: SHIPPED | OUTPATIENT
Start: 2022-04-22 | End: 2022-04-22 | Stop reason: SDUPTHER

## 2022-04-22 RX ORDER — COPPER 313.4 MG/1
1 INTRAUTERINE DEVICE INTRAUTERINE ONCE
Qty: 1 INTRA UTERINE DEVICE | Refills: 0 | Status: SHIPPED | OUTPATIENT
Start: 2022-04-22 | End: 2022-04-22

## 2022-04-22 NOTE — PROGRESS NOTES
Subjective   Chief Complaint   Patient presents with   • Contraception     New Patient here to discuss getting an Mirena     Crystal Eveline Negron is a 42 y.o. year old  (C/S x 1) .  Patient's last menstrual period was 04/15/2022.  She presents to be seen because of contraception.  PAragurad removed last month -- 12 years. Normal pap at that time. Not interested in anything hormonal    OTHER COMPLAINTS:  Nothing else    The following portions of the patient's history were reviewed and updated as appropriate:  She  has a past medical history of ADHD (attention deficit hyperactivity disorder) (10/2002), Allergic (2002), Anemia, Anxiety (10/2006), Depression (2018), Exposure to sexually transmitted disease (STD), History of anemia, History of body piercing, History of ECG (2016), History of hypertension, History of kidney infection, History of migraine, Hyperlipidemia, Hypertension (), Low back pain (2016), PMS (premenstrual syndrome), Prehypertension, Recurrent genital herpes, Scoliosis (2008), and Visual impairment (2002).  She does not have any pertinent problems on file.  She  has a past surgical history that includes  section; [place] tooth extraction; Forearm fracture surgery; Umbilical hernia repair; Breast surgery (2016); Cosmetic surgery (2016); and Back surgery.  Her family history includes Arthritis in her father, paternal uncle, and another family member; Hyperlipidemia in her father and mother; Hypertension in her father; Mental illness in her father; Migraines in her father; Obesity in her father and mother; Osteoporosis in her mother; Stroke in her father.  She  reports that she has been smoking electronic cigarette. She started smoking about a year ago. She has a 0.25 pack-year smoking history. She has never used smokeless tobacco. She reports current alcohol use of about 6.0 standard drinks of alcohol per week. She reports that she does not use drugs.  Current  Outpatient Medications   Medication Sig Dispense Refill   • acetaminophen (Tylenol 8 Hour) 650 MG 8 hr tablet Take 1 tablet by mouth Every 8 (Eight) Hours As Needed for Mild Pain . 12 tablet 0   • cyclobenzaprine (FLEXERIL) 5 MG tablet Take 1 tablet by mouth At Night As Needed for Muscle Spasms. 30 tablet 0   • fluticasone (FLONASE) 50 MCG/ACT nasal spray      • lisdexamfetamine (VYVANSE) 50 MG capsule Take  by mouth daily.     • valACYclovir (VALTREX) 500 MG tablet Take 1 tablet by mouth Daily. 30 tablet 5   • amphetamine-dextroamphetamine (ADDERALL) 10 MG tablet Take  by mouth daily.       No current facility-administered medications for this visit.     Current Outpatient Medications on File Prior to Visit   Medication Sig   • acetaminophen (Tylenol 8 Hour) 650 MG 8 hr tablet Take 1 tablet by mouth Every 8 (Eight) Hours As Needed for Mild Pain .   • cyclobenzaprine (FLEXERIL) 5 MG tablet Take 1 tablet by mouth At Night As Needed for Muscle Spasms.   • fluticasone (FLONASE) 50 MCG/ACT nasal spray    • lisdexamfetamine (VYVANSE) 50 MG capsule Take  by mouth daily.   • valACYclovir (VALTREX) 500 MG tablet Take 1 tablet by mouth Daily.   • amphetamine-dextroamphetamine (ADDERALL) 10 MG tablet Take  by mouth daily.     No current facility-administered medications on file prior to visit.     She is allergic to augmentin [amoxicillin-pot clavulanate] and bactrim [sulfamethoxazole-trimethoprim].    Social History    Tobacco Use      Smoking status: Current Every Day Smoker        Packs/day: 0.50        Years: 0.50        Pack years: .25        Types: Electronic Cigarette        Start date: 4/15/2021        Quit date: 2000        Years since quittin.6      Smokeless tobacco: Never Used      Tobacco comment: Not sure when i quit exactly. I never felt particularly addicted so i just quit.    Review of Systems  Consitutional POS: nothing reported    NEG: anorexia or night sweats   Gastointestinal POS: nothing  reported    NEG: bloating, change in bowel habits, melena or reflux symptoms   Genitourinary POS: nothing reported    NEG: dysuria or hematuria   Integument POS: nothing reported    NEG: moles that are changing in size, shape, color or rashes   Breast POS: nothing reported    NEG: persistent breast lump, skin dimpling or nipple discharge         Respiratory: negative  Cardiovascular: negative          Objective   /84   Wt 81.1 kg (178 lb 12.8 oz)   LMP 04/15/2022   Breastfeeding No   BMI 26.40 kg/m²     General:  well developed; well nourished  no acute distress   Skin:  Not performed.   Thyroid: not examined   Lungs:  breathing is unlabored   Heart:  Not performed.   Breasts:  Not performed.   Abdomen: Not performed.   Pelvis: Not performed.     Psychiatric: Alert and oriented ×3, mood and affect appropriate  HEENT: Atraumatic, normocephalic, normal scleral icterus  Extremities: 2+ pulses bilaterally, no edema      Lab Review   SCANNED - PAP SMEAR (03/16/2022)      Imaging   No data reviewed        Assessment   1. Contraceptive counseling: Patient does not desire any hormonal management and did well with ParaGard.     Plan   1. ParaGard IUD ordered.  Patient to call with next menses for insertion.  2.     No orders of the defined types were placed in this encounter.         This note was electronically signed.      April 22, 2022

## 2022-04-28 ENCOUNTER — PATIENT ROUNDING (BHMG ONLY) (OUTPATIENT)
Dept: OBSTETRICS AND GYNECOLOGY | Facility: CLINIC | Age: 42
End: 2022-04-28

## 2022-04-28 NOTE — PROGRESS NOTES
April 28, 2022    Hello, may I speak with Leigh Negron?    My name is Shoshana Cabrera    I am  with [facility]  [address], [address]  [facility] [address] [phone].    Before we get started may I verify your date of birth? 1980    I am calling to officially welcome you to our practice and ask about your recent visit. Is this a good time to talk? Yes    Tell me about your visit with us. What things went well?  My visit went great!     We're always looking for ways to make our patients' experiences even better. Do you have recommendations on ways we may improve?  No    Overall were you satisfied with your first visit to our practice? Yes       I appreciate you taking the time to speak with me today. Is there anything else I can do for you? No    Thank you, and have a great day.

## 2022-05-09 ENCOUNTER — OFFICE VISIT (OUTPATIENT)
Dept: OBSTETRICS AND GYNECOLOGY | Facility: CLINIC | Age: 42
End: 2022-05-09

## 2022-05-09 VITALS — WEIGHT: 177.6 LBS | SYSTOLIC BLOOD PRESSURE: 110 MMHG | BODY MASS INDEX: 26.23 KG/M2 | DIASTOLIC BLOOD PRESSURE: 80 MMHG

## 2022-05-09 DIAGNOSIS — Z30.430 ENCOUNTER FOR IUD INSERTION: ICD-10-CM

## 2022-05-09 DIAGNOSIS — Z30.49 ENCOUNTER FOR SURVEILLANCE OF OTHER CONTRACEPTIVE: Primary | ICD-10-CM

## 2022-05-09 LAB
B-HCG UR QL: NEGATIVE
EXPIRATION DATE: NORMAL
INTERNAL NEGATIVE CONTROL: NEGATIVE
INTERNAL POSITIVE CONTROL: POSITIVE
Lab: NORMAL

## 2022-05-09 PROCEDURE — 81025 URINE PREGNANCY TEST: CPT | Performed by: OBSTETRICS & GYNECOLOGY

## 2022-05-09 PROCEDURE — 58300 INSERT INTRAUTERINE DEVICE: CPT | Performed by: OBSTETRICS & GYNECOLOGY

## 2022-05-09 RX ORDER — COPPER 313.4 MG/1
INTRAUTERINE DEVICE INTRAUTERINE ONCE
Status: COMPLETED | OUTPATIENT
Start: 2022-05-09 | End: 2022-05-09

## 2022-05-09 RX ADMIN — COPPER: 313.4 INTRAUTERINE DEVICE INTRAUTERINE at 15:22

## 2022-05-09 NOTE — PROGRESS NOTES
IUD Insertion    Patient's last menstrual period was 05/08/2022.    Date of procedure:  5/9/2022    Risks and benefits discussed? yes  All questions answered? yes  Consents given by The patient  Written consent obtained? yes    Local anesthesia used:  no    Procedure documentation:    After verifying the patient had a low probability of being pregnant and met the criteria for insertion, a sterile speculum has placed and the cervix was cleansed with an antiseptic solution.  Vaginal discharge was scant.  The anterior lip of the cervix was grasped with a tenaculum and the uterine cavity was gently sounded. There was no difficulty passing the sound through the cervix.  Cervical dilation did not need to be performed prior to placing the IUD.  The uterus was anteverted and sounded to 8 cms.  The ParaGard was then prepared per the manufacturers instructions.    The Paraguard was advanced through the cervical canal until the upper edge of the flange was flush with the external cervix.  The insertion sebastian was held in place while the insertion tube was withdrawn.  I waited 10-15 seconds for the arms of the IUS to fully open and the devise to seat in the cavity.  The sebastian was removed first followed by the insertion tube.. The string was cut 2 cms in length.  Bleeding from the cervix was scant.    She tolerated the procedure without any difficulty.    Post procedure instructions: Call if any fever or excessive bleeding or pain.    Follow up needed:  6 weeks to confirm correct placement    This note was electronically signed.    Steven Avila MD

## 2022-05-11 ENCOUNTER — HOSPITAL ENCOUNTER (OUTPATIENT)
Dept: MAMMOGRAPHY | Facility: HOSPITAL | Age: 42
Discharge: HOME | End: 2022-05-11
Admitting: FAMILY MEDICINE

## 2022-05-11 DIAGNOSIS — Z12.31 ENCOUNTER FOR SCREENING MAMMOGRAM FOR MALIGNANT NEOPLASM OF BREAST: ICD-10-CM

## 2022-05-11 PROCEDURE — 77063 BREAST TOMOSYNTHESIS BI: CPT

## 2022-05-11 PROCEDURE — 77067 SCR MAMMO BI INCL CAD: CPT

## 2022-06-14 ENCOUNTER — OFFICE VISIT (OUTPATIENT)
Dept: OBSTETRICS AND GYNECOLOGY | Facility: CLINIC | Age: 42
End: 2022-06-14

## 2022-06-14 VITALS — WEIGHT: 180.6 LBS | DIASTOLIC BLOOD PRESSURE: 82 MMHG | BODY MASS INDEX: 26.67 KG/M2 | SYSTOLIC BLOOD PRESSURE: 110 MMHG

## 2022-06-14 DIAGNOSIS — Z30.431 IUD CHECK UP: Primary | ICD-10-CM

## 2022-06-14 PROCEDURE — 99212 OFFICE O/P EST SF 10 MIN: CPT | Performed by: OBSTETRICS & GYNECOLOGY

## 2022-06-14 NOTE — PROGRESS NOTES
Subjective   Chief Complaint   Patient presents with   • Follow-up     5 Week IUD check     Leigh Negron is a 42 y.o. year old  presenting to be seen for follow-up of her recently placed ParaGard.  Since the time of insertion flow is typically normal.  She has been sexually active.  [place] has not been painful for her.   [place] has not been painful for her partner.    OTHER COMPLAINTS:  Nothing else       Objective   /82   Wt 81.9 kg (180 lb 9.6 oz)   Breastfeeding No   BMI 26.67 kg/m²     Imaging   PAraguard IUD in place, strings visible       Assessment   1. IUD check up     Plan   1. Doing well, strings visible  2. Follow up for annual exam     No orders of the defined types were placed in this encounter.         Total time spent today with Leigh  was 10-19 minutes (level 2).  Greater than 50% of the time was spent coordinating care, answering her questions and counseling regarding pathophysiology of her presenting problem along with plans for any diagnositc work-up and treatment.    This note was electronically signed.      2022

## 2023-04-25 ENCOUNTER — HOSPITAL ENCOUNTER (EMERGENCY)
Facility: HOSPITAL | Age: 43
Discharge: HOME | End: 2023-04-25
Attending: FAMILY MEDICINE
Payer: COMMERCIAL

## 2023-04-25 ENCOUNTER — APPOINTMENT (OUTPATIENT)
Dept: CT IMAGING | Facility: HOSPITAL | Age: 43
End: 2023-04-25
Payer: COMMERCIAL

## 2023-04-25 VITALS
WEIGHT: 185 LBS | OXYGEN SATURATION: 100 % | HEIGHT: 69 IN | DIASTOLIC BLOOD PRESSURE: 94 MMHG | SYSTOLIC BLOOD PRESSURE: 125 MMHG | TEMPERATURE: 100 F | HEART RATE: 89 BPM | BODY MASS INDEX: 27.4 KG/M2 | RESPIRATION RATE: 20 BRPM

## 2023-04-25 DIAGNOSIS — K57.92 DIVERTICULITIS: Primary | ICD-10-CM

## 2023-04-25 LAB
ALBUMIN SERPL-MCNC: 4 G/DL (ref 3.5–5.2)
ALBUMIN/GLOB SERPL: 1.5 G/DL
ALP SERPL-CCNC: 57 U/L (ref 39–117)
ALT SERPL W P-5'-P-CCNC: 13 U/L (ref 1–33)
ANION GAP SERPL CALCULATED.3IONS-SCNC: 11 MMOL/L (ref 5–15)
AST SERPL-CCNC: 13 U/L (ref 1–32)
B-HCG UR QL: NEGATIVE
BACTERIA UR QL AUTO: NORMAL /HPF
BASOPHILS # BLD AUTO: 0.05 10*3/MM3 (ref 0–0.2)
BASOPHILS NFR BLD AUTO: 0.4 % (ref 0–1.5)
BILIRUB SERPL-MCNC: 0.4 MG/DL (ref 0–1.2)
BILIRUB UR QL STRIP: NEGATIVE
BUN SERPL-MCNC: 8 MG/DL (ref 6–20)
BUN/CREAT SERPL: 11.3 (ref 7–25)
CALCIUM SPEC-SCNC: 8.4 MG/DL (ref 8.6–10.5)
CHLORIDE SERPL-SCNC: 103 MMOL/L (ref 98–107)
CLARITY UR: CLEAR
CO2 SERPL-SCNC: 21 MMOL/L (ref 22–29)
COLOR UR: YELLOW
CREAT SERPL-MCNC: 0.71 MG/DL (ref 0.57–1)
DEPRECATED RDW RBC AUTO: 39.3 FL (ref 37–54)
EGFRCR SERPLBLD CKD-EPI 2021: 109 ML/MIN/1.73
EOSINOPHIL # BLD AUTO: 0.04 10*3/MM3 (ref 0–0.4)
EOSINOPHIL NFR BLD AUTO: 0.3 % (ref 0.3–6.2)
ERYTHROCYTE [DISTWIDTH] IN BLOOD BY AUTOMATED COUNT: 12.8 % (ref 12.3–15.4)
GLOBULIN UR ELPH-MCNC: 2.7 GM/DL
GLUCOSE SERPL-MCNC: 110 MG/DL (ref 65–99)
GLUCOSE UR STRIP-MCNC: NEGATIVE MG/DL
HCT VFR BLD AUTO: 38.6 % (ref 34–46.6)
HGB BLD-MCNC: 12.8 G/DL (ref 12–15.9)
HGB UR QL STRIP.AUTO: ABNORMAL
HYALINE CASTS UR QL AUTO: NORMAL /LPF
IMM GRANULOCYTES # BLD AUTO: 0.02 10*3/MM3 (ref 0–0.05)
IMM GRANULOCYTES NFR BLD AUTO: 0.2 % (ref 0–0.5)
KETONES UR QL STRIP: NEGATIVE
LEUKOCYTE ESTERASE UR QL STRIP.AUTO: ABNORMAL
LIPASE SERPL-CCNC: 14 U/L (ref 13–60)
LYMPHOCYTES # BLD AUTO: 0.84 10*3/MM3 (ref 0.7–3.1)
LYMPHOCYTES NFR BLD AUTO: 6.9 % (ref 19.6–45.3)
MCH RBC QN AUTO: 28 PG (ref 26.6–33)
MCHC RBC AUTO-ENTMCNC: 33.2 G/DL (ref 31.5–35.7)
MCV RBC AUTO: 84.5 FL (ref 79–97)
MONOCYTES # BLD AUTO: 0.78 10*3/MM3 (ref 0.1–0.9)
MONOCYTES NFR BLD AUTO: 6.4 % (ref 5–12)
NEUTROPHILS NFR BLD AUTO: 10.39 10*3/MM3 (ref 1.7–7)
NEUTROPHILS NFR BLD AUTO: 85.8 % (ref 42.7–76)
NITRITE UR QL STRIP: NEGATIVE
NRBC BLD AUTO-RTO: 0 /100 WBC (ref 0–0.2)
PH UR STRIP.AUTO: 5.5 [PH] (ref 5–8)
PLATELET # BLD AUTO: 224 10*3/MM3 (ref 140–450)
PMV BLD AUTO: 11.3 FL (ref 6–12)
POTASSIUM SERPL-SCNC: 3.7 MMOL/L (ref 3.5–5.2)
PROT SERPL-MCNC: 6.7 G/DL (ref 6–8.5)
PROT UR QL STRIP: NEGATIVE
RBC # BLD AUTO: 4.57 10*6/MM3 (ref 3.77–5.28)
RBC # UR STRIP: NORMAL /HPF
REF LAB TEST METHOD: NORMAL
SODIUM SERPL-SCNC: 135 MMOL/L (ref 136–145)
SP GR UR STRIP: 1.01 (ref 1–1.03)
SQUAMOUS #/AREA URNS HPF: NORMAL /HPF
UROBILINOGEN UR QL STRIP: ABNORMAL
WBC # UR STRIP: NORMAL /HPF
WBC NRBC COR # BLD: 12.12 10*3/MM3 (ref 3.4–10.8)

## 2023-04-25 PROCEDURE — 81025 URINE PREGNANCY TEST: CPT | Performed by: PHYSICIAN ASSISTANT

## 2023-04-25 PROCEDURE — 25010000002 KETOROLAC TROMETHAMINE PER 15 MG: Performed by: PHYSICIAN ASSISTANT

## 2023-04-25 PROCEDURE — 96374 THER/PROPH/DIAG INJ IV PUSH: CPT

## 2023-04-25 PROCEDURE — 74177 CT ABD & PELVIS W/CONTRAST: CPT

## 2023-04-25 PROCEDURE — 85025 COMPLETE CBC W/AUTO DIFF WBC: CPT | Performed by: PHYSICIAN ASSISTANT

## 2023-04-25 PROCEDURE — 83690 ASSAY OF LIPASE: CPT | Performed by: PHYSICIAN ASSISTANT

## 2023-04-25 PROCEDURE — 81001 URINALYSIS AUTO W/SCOPE: CPT | Performed by: PHYSICIAN ASSISTANT

## 2023-04-25 PROCEDURE — 80053 COMPREHEN METABOLIC PANEL: CPT | Performed by: PHYSICIAN ASSISTANT

## 2023-04-25 PROCEDURE — 99284 EMERGENCY DEPT VISIT MOD MDM: CPT

## 2023-04-25 PROCEDURE — 25510000001 IOPAMIDOL 61 % SOLUTION: Performed by: EMERGENCY MEDICINE

## 2023-04-25 PROCEDURE — 36415 COLL VENOUS BLD VENIPUNCTURE: CPT

## 2023-04-25 RX ORDER — SODIUM CHLORIDE 0.9 % (FLUSH) 0.9 %
10 SYRINGE (ML) INJECTION AS NEEDED
Status: DISCONTINUED | OUTPATIENT
Start: 2023-04-25 | End: 2023-04-25 | Stop reason: HOSPADM

## 2023-04-25 RX ORDER — DICYCLOMINE HYDROCHLORIDE 10 MG/1
20 CAPSULE ORAL ONCE
Status: COMPLETED | OUTPATIENT
Start: 2023-04-25 | End: 2023-04-25

## 2023-04-25 RX ORDER — METRONIDAZOLE 500 MG/1
500 TABLET ORAL ONCE
Status: COMPLETED | OUTPATIENT
Start: 2023-04-25 | End: 2023-04-25

## 2023-04-25 RX ORDER — ACETAMINOPHEN 500 MG
1000 TABLET ORAL ONCE
Status: COMPLETED | OUTPATIENT
Start: 2023-04-25 | End: 2023-04-25

## 2023-04-25 RX ORDER — KETOROLAC TROMETHAMINE 30 MG/ML
30 INJECTION, SOLUTION INTRAMUSCULAR; INTRAVENOUS ONCE
Status: COMPLETED | OUTPATIENT
Start: 2023-04-25 | End: 2023-04-25

## 2023-04-25 RX ORDER — DICYCLOMINE HCL 20 MG
20 TABLET ORAL EVERY 6 HOURS PRN
Qty: 20 TABLET | Refills: 0 | Status: SHIPPED | OUTPATIENT
Start: 2023-04-25

## 2023-04-25 RX ORDER — CIPROFLOXACIN 500 MG/1
500 TABLET, FILM COATED ORAL ONCE
Status: COMPLETED | OUTPATIENT
Start: 2023-04-25 | End: 2023-04-25

## 2023-04-25 RX ORDER — METRONIDAZOLE 500 MG/1
500 TABLET ORAL 3 TIMES DAILY
Qty: 30 TABLET | Refills: 0 | Status: SHIPPED | OUTPATIENT
Start: 2023-04-25 | End: 2023-05-05

## 2023-04-25 RX ORDER — ONDANSETRON 4 MG/1
4 TABLET, ORALLY DISINTEGRATING ORAL EVERY 6 HOURS PRN
Qty: 20 TABLET | Refills: 0 | Status: SHIPPED | OUTPATIENT
Start: 2023-04-25

## 2023-04-25 RX ORDER — CIPROFLOXACIN 500 MG/1
500 TABLET, FILM COATED ORAL 2 TIMES DAILY
Qty: 20 TABLET | Refills: 0 | Status: SHIPPED | OUTPATIENT
Start: 2023-04-25 | End: 2023-05-05

## 2023-04-25 RX ADMIN — KETOROLAC TROMETHAMINE 30 MG: 30 INJECTION, SOLUTION INTRAMUSCULAR; INTRAVENOUS at 19:24

## 2023-04-25 RX ADMIN — DICYCLOMINE HYDROCHLORIDE 20 MG: 10 CAPSULE ORAL at 21:39

## 2023-04-25 RX ADMIN — CIPROFLOXACIN 500 MG: 500 TABLET, FILM COATED ORAL at 21:39

## 2023-04-25 RX ADMIN — SODIUM CHLORIDE 1000 ML: 9 INJECTION, SOLUTION INTRAVENOUS at 19:24

## 2023-04-25 RX ADMIN — IOPAMIDOL 100 ML: 612 INJECTION, SOLUTION INTRAVENOUS at 20:03

## 2023-04-25 RX ADMIN — ACETAMINOPHEN 1000 MG: 500 TABLET, FILM COATED ORAL at 19:24

## 2023-04-25 RX ADMIN — METRONIDAZOLE 500 MG: 500 TABLET ORAL at 21:39

## 2023-04-25 NOTE — Clinical Note
[facility] EMERGENCY DEPARTMENT  [address] [address]  Phone: [phone]    Leigh Negron was seen and treated in our emergency department on 4/25/2023.  She may return to work on 04/27/2023.         Thank you for choosing [facility].    Beau Zhao MD

## 2023-04-25 NOTE — ED PROVIDER NOTES
Subjective  History of Present Illness:    Chief Complaint:   Chief Complaint   Patient presents with   • Abdominal Pain      History of Present Illness: Leigh Negron is a 43 y.o. female who presents to the emergency department complaining of abdominal pain began yesterday morning, some nausea, no vomiting, normal BM Sunday, no bm yesterday, concerned she was constipated so took 2 dulcolax last pm, some soft stool today, small amount of stool today. Now just lower abd pain. Noted fever here 100. C/o body aches and chills. Urinary frequency and urgency but no dysuria.   Onset: yesterday am  Duration: ongoing  Exacerbating / Alleviating factors: None  Associated symptoms: None      Nurses Notes reviewed and agree, including vitals, allergies, social history and prior medical history.     Review of Systems   Constitutional: Positive for fever.   HENT: Negative.    Eyes: Negative.    Respiratory: Negative.    Cardiovascular: Negative.    Gastrointestinal: Positive for abdominal pain, constipation and nausea.   Genitourinary: Negative.    Musculoskeletal: Negative.    Skin: Negative.    Neurological: Negative.    Psychiatric/Behavioral: Negative.        Past Medical History:   Diagnosis Date   • ADHD (attention deficit hyperactivity disorder) 10/2002   • Allergic 03/2002    Had prick test done several years ago. Didn't explain sympto   • Anemia    • Anxiety 10/2006    I have a stressful job   • Depression 06/2018   • Exposure to sexually transmitted disease (STD)    • History of anemia    • History of body piercing    • History of ECG 08/26/2016   • History of hypertension    • History of kidney infection    • History of migraine    • Hyperlipidemia    • Hypertension 2006   • Low back pain 09/2016    MRI showed bulging discs at L4 & L5. 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   •  "Visual impairment 2002    I wear glasses       Allergies:    Augmentin [amoxicillin-pot clavulanate] and Bactrim [sulfamethoxazole-trimethoprim]      Past Surgical History:   Procedure Laterality Date   • AUGMENTATION MAMMAPLASTY     • BACK SURGERY     • BREAST SURGERY  2016    Breast augmentation   •  SECTION     • COSMETIC SURGERY  2016    Breast augmentation   • FOREARM FRACTURE SURGERY     • UMBILICAL HERNIA REPAIR     • WISDOM TOOTH EXTRACTION           Social History     Socioeconomic History   • Marital status:    Tobacco Use   • Smoking status: Every Day     Packs/day: 0.50     Years: 0.50     Pack years: 0.25     Types: Electronic Cigarette, Cigarettes     Start date: 4/15/2021     Last attempt to quit: 2000     Years since quittin.6   • Smokeless tobacco: Never   • Tobacco comments:     Not sure when i quit exactly. I never felt particularly addicted so i just quit.   Vaping Use   • Vaping Use: Every day   • Substances: Nicotine   Substance and Sexual Activity   • Alcohol use: Yes     Alcohol/week: 6.0 standard drinks     Types: 2 Glasses of wine, 2 Cans of beer, 2 Shots of liquor per week     Comment: Occasional alcohol use   • Drug use: No   • Sexual activity: Yes     Partners: Male     Birth control/protection: I.U.D., Condom     Comment: I need to have my IUD replaced         Family History   Problem Relation Age of Onset   • Hyperlipidemia Mother    • Obesity Mother    • Osteoporosis Mother    • Stroke Father    • Arthritis Father    • Hyperlipidemia Father    • Hypertension Father    • Mental illness Father    • Migraines Father    • Obesity Father    • Breast cancer Paternal Grandmother    • Arthritis Paternal Uncle         Rheumatoid arthritis   • Arthritis Other        Objective  Physical Exam:  BP (!) 146/107 (BP Location: Left arm, Patient Position: Sitting)   Pulse (!) 123   Temp 100 °F (37.8 °C) (Oral)   Resp 18   Ht 175.3 cm (69\")   Wt 83.9 kg (185 " lb)   SpO2 97%   BMI 27.32 kg/m²      Physical Exam  Vitals and nursing note reviewed.   Constitutional:       General: She is not in acute distress.     Appearance: She is well-developed and normal weight. She is not ill-appearing, toxic-appearing or diaphoretic.   HENT:      Head: Normocephalic and atraumatic.   Eyes:      Extraocular Movements: Extraocular movements intact.   Cardiovascular:      Rate and Rhythm: Tachycardia present.   Pulmonary:      Effort: Pulmonary effort is normal.   Abdominal:      General: Abdomen is flat.      Palpations: Abdomen is soft.      Tenderness: There is abdominal tenderness in the left lower quadrant.   Skin:     General: Skin is warm and dry.   Neurological:      General: No focal deficit present.      Mental Status: She is alert.   Psychiatric:         Mood and Affect: Mood normal.         Behavior: Behavior normal.           Procedures    ED Course:         Lab Results (last 24 hours)     Procedure Component Value Units Date/Time    Pregnancy, Urine - Urine, Clean Catch [[id number]]  (Normal) Collected: 04/25/23 1918    Specimen: Urine, Clean Catch Updated: 04/25/23 1938     HCG, Urine QL Negative    Urinalysis With Microscopic If Indicated (No Culture) - Urine, Clean Catch [[id number]]  (Abnormal) Collected: 04/25/23 1918    Specimen: Urine, Clean Catch Updated: 04/25/23 1939     Color, UA Yellow     Appearance, UA Clear     pH, UA 5.5     Specific Gravity, UA 1.012     Glucose, UA Negative     Ketones, UA Negative     Bilirubin, UA Negative     Blood, UA Trace     Protein, UA Negative     Leuk Esterase, UA Trace     Nitrite, UA Negative     Urobilinogen, UA 0.2 E.U./dL    Urinalysis, Microscopic Only - Urine, Clean Catch [[id number]] Collected: 04/25/23 1918    Specimen: Urine, Clean Catch Updated: 04/25/23 2000     RBC, UA None Seen /HPF      WBC, UA None Seen /HPF      Bacteria, UA None Seen /HPF      Squamous Epithelial Cells, UA None Seen /HPF      Hyaline Casts, UA  None Seen /LPF      Methodology Manual Light Microscopy    CBC & Differential [[id number]]  (Abnormal) Collected: 04/25/23 1922    Specimen: Blood Updated: 04/25/23 1932    Narrative:      The following orders were created for panel order CBC & Differential.  Procedure                               Abnormality         Status                     ---------                               -----------         ------                     CBC Auto Differential[[id number]]        Abnormal            Final result                 Please view results for these tests on the individual orders.    Comprehensive Metabolic Panel [[id number]]  (Abnormal) Collected: 04/25/23 1922    Specimen: Blood Updated: 04/25/23 1943     Glucose 110 mg/dL      BUN 8 mg/dL      Creatinine 0.71 mg/dL      Sodium 135 mmol/L      Potassium 3.7 mmol/L      Chloride 103 mmol/L      CO2 21.0 mmol/L      Calcium 8.4 mg/dL      Total Protein 6.7 g/dL      Albumin 4.0 g/dL      ALT (SGPT) 13 U/L      AST (SGOT) 13 U/L      Alkaline Phosphatase 57 U/L      Total Bilirubin 0.4 mg/dL      Globulin 2.7 gm/dL      A/G Ratio 1.5 g/dL      BUN/Creatinine Ratio 11.3     Anion Gap 11.0 mmol/L      eGFR 109.0 mL/min/1.73     Narrative:      GFR Normal >60  Chronic Kidney Disease <60  Kidney Failure <15      Lipase [[id number]]  (Normal) Collected: 04/25/23 1922    Specimen: Blood Updated: 04/25/23 1943     Lipase 14 U/L     CBC Auto Differential [[id number]]  (Abnormal) Collected: 04/25/23 1922    Specimen: Blood Updated: 04/25/23 1932     WBC 12.12 10*3/mm3      RBC 4.57 10*6/mm3      Hemoglobin 12.8 g/dL      Hematocrit 38.6 %      MCV 84.5 fL      MCH 28.0 pg      MCHC 33.2 g/dL      RDW 12.8 %      RDW-SD 39.3 fl      MPV 11.3 fL      Platelets 224 10*3/mm3      Neutrophil % 85.8 %      Lymphocyte % 6.9 %      Monocyte % 6.4 %      Eosinophil % 0.3 %      Basophil % 0.4 %      Immature Grans % 0.2 %      Neutrophils, Absolute 10.39 10*3/mm3      Lymphocytes, Absolute  "0.84 10*3/mm3      Monocytes, Absolute 0.78 10*3/mm3      Eosinophils, Absolute 0.04 10*3/mm3      Basophils, Absolute 0.05 10*3/mm3      Immature Grans, Absolute 0.02 10*3/mm3      nRBC 0.0 /100 WBC            No radiology results from the last 24 hrs       [facility]    Leigh Negron is a 43 y.o. female who presents to the emergency department for evaluation of lower abdominal pain on the left, nausea, constipation, fever    Differential diagnosis includes diverticulitis, colitis, among other etiologies.    CBC, CMP, lipase, urinalysis ordered for further evaluation of the patient's presentation.    Chart review if available included outside testing, previous visits, prior labs, prior imaging, available notes from prior evaluations or visits with specialists, medication list, allergies, past medical history, past surgical history when applicable.    Patient was treated with Toradol, IV fluids, Tylenol, Zofran    Patient has \"severe uncomplicated diverticulitis of the proximal sigmoid colon\" she declined Accardi pain medication will treat with Bentyl Cipro Flagyl Zofran    Plan for disposition is discharged home.  Patient/family comfortable with and understanding of the plan.      Final diagnoses:   Diverticulitis        Kota Moon PA-C  04/25/23 2128    "

## 2023-05-04 ENCOUNTER — OFFICE VISIT (OUTPATIENT)
Dept: FAMILY MEDICINE CLINIC | Facility: CLINIC | Age: 43
End: 2023-05-04
Payer: COMMERCIAL

## 2023-05-04 VITALS
HEART RATE: 88 BPM | DIASTOLIC BLOOD PRESSURE: 86 MMHG | SYSTOLIC BLOOD PRESSURE: 142 MMHG | RESPIRATION RATE: 14 BRPM | OXYGEN SATURATION: 100 % | BODY MASS INDEX: 26.66 KG/M2 | TEMPERATURE: 97.1 F | WEIGHT: 180 LBS | HEIGHT: 69 IN

## 2023-05-04 DIAGNOSIS — Z11.3 SCREEN FOR STD (SEXUALLY TRANSMITTED DISEASE): ICD-10-CM

## 2023-05-04 DIAGNOSIS — N89.8 VAGINAL DISCHARGE: ICD-10-CM

## 2023-05-04 DIAGNOSIS — K57.92 DIVERTICULITIS: ICD-10-CM

## 2023-05-04 DIAGNOSIS — Z11.4 SCREENING FOR HIV (HUMAN IMMUNODEFICIENCY VIRUS): ICD-10-CM

## 2023-05-04 DIAGNOSIS — Z83.79 FAMILY HISTORY OF COLONIC DIVERTICULITIS: ICD-10-CM

## 2023-05-04 DIAGNOSIS — Z11.59 NEED FOR HEPATITIS C SCREENING TEST: ICD-10-CM

## 2023-05-04 DIAGNOSIS — R53.82 CHRONIC FATIGUE: ICD-10-CM

## 2023-05-04 DIAGNOSIS — Z00.00 WELL ADULT EXAM: Primary | ICD-10-CM

## 2023-05-04 RX ORDER — VALACYCLOVIR HYDROCHLORIDE 500 MG/1
500 TABLET, FILM COATED ORAL DAILY
Qty: 90 TABLET | Refills: 3 | Status: SHIPPED | OUTPATIENT
Start: 2023-05-04

## 2023-05-04 RX ORDER — HYDROCORTISONE ACETATE 25 MG/1
25 SUPPOSITORY RECTAL 2 TIMES DAILY PRN
Qty: 12 EACH | Refills: 0 | Status: SHIPPED | OUTPATIENT
Start: 2023-05-04

## 2023-05-04 NOTE — PROGRESS NOTES
"Subjective   Leigh Negron is a 43 y.o. female.     History of Present Illness  Mrs. Jara presents today for annual checkup.       status post  2009  IUD paraguard placed 2022  Last Pap smear  normal with negative high-risk HPV testing.  Distant history of abnormal Pap smear in .      She is a former smoker.  Smoked approximately 1 pack/day for 2 years. Quit in the year . Currently using E cig     Endorses moderate alcohol intake.  Getting irregular exercise. She is wearing her seatbelt appropriately.  Has a firearm in home.  She is up-to-date on vaccinations.  Last dental checkup 6 months ago.     She is sexually active.  Has had 1 sexual partner in the past few weeks. Endorses 40+ sexual partners in her lifetime. Previous STD screening negative. She requests GC/Chlamydia screen, NuSwab. On valtrex for recurrent gen herpes. Denies genital lesion, vaginal pain or pelvic pain. some vaginal discharge.     Baseline mammogram 2022 BiRADS 1    Has had lab eval by outlying physician, \"Metabolic stuff\". Results pending.     Seen in ER April this year for acute diverticulitis. Mother and father with h/o diverticulitis, requiring sigmoidectomy. Has taken abx as rx'd. Denies fever, blood in stool.    Pt's previous HPI reviewed and updated as indicated.     The following portions of the patient's history were reviewed and updated as appropriate: allergies, current medications, past family history, past medical history, past social history, past surgical history and problem list.    Review of Systems   Constitutional: Negative for fatigue, fever and unexpected weight change.   HENT: Negative for congestion, mouth sores, rhinorrhea, sore throat and trouble swallowing.    Eyes: Negative for visual disturbance.   Respiratory: Negative for cough, shortness of breath and wheezing.    Cardiovascular: Negative for chest pain, palpitations and leg swelling.   Gastrointestinal: Negative for " abdominal pain, blood in stool, constipation, diarrhea and vomiting.   Endocrine: Negative for polydipsia and polyuria.   Genitourinary: Positive for vaginal discharge. Negative for dyspareunia, dysuria, frequency, genital sores, hematuria, menstrual problem, pelvic pain and urgency.   Musculoskeletal: Positive for arthralgias and myalgias. Negative for back pain.   Skin: Negative for rash and wound.   Neurological: Positive for headaches. Negative for dizziness, tremors, syncope and weakness.   Hematological: Negative for adenopathy. Does not bruise/bleed easily.   Psychiatric/Behavioral: Positive for sleep disturbance. Negative for confusion and dysphoric mood. The patient is not nervous/anxious.    Pt's previous ROS reviewed and updated as indicated.     Objective    Vitals:    05/04/23 1113   BP: 142/86   Pulse: 88   Resp: 14   Temp: 97.1 °F (36.2 °C)   SpO2: 100%     Body mass index is 26.58 kg/m².      05/04/23  1113   Weight: 81.6 kg (180 lb)       Physical Exam  Vitals and nursing note reviewed.   Constitutional:       General: She is not in acute distress.     Appearance: She is well-developed and well-groomed. She is not ill-appearing.   HENT:      Head: Atraumatic.      Right Ear: Tympanic membrane, ear canal and external ear normal.      Left Ear: Tympanic membrane, ear canal and external ear normal.      Nose: Nose normal.      Mouth/Throat:      Mouth: Mucous membranes are moist. No oral lesions.      Pharynx: Oropharynx is clear.   Eyes:      General: No scleral icterus.     Conjunctiva/sclera: Conjunctivae normal.   Neck:      Thyroid: No thyroid mass.   Cardiovascular:      Rate and Rhythm: Normal rate and regular rhythm.      Pulses: Normal pulses.      Heart sounds: Normal heart sounds.   Pulmonary:      Effort: Pulmonary effort is normal.      Breath sounds: Normal breath sounds.   Abdominal:      General: Bowel sounds are normal. There is no distension.      Palpations: Abdomen is soft. There is  no hepatomegaly, splenomegaly or mass.      Tenderness: There is abdominal tenderness (mild) in the left lower quadrant. There is no guarding or rebound.   Musculoskeletal:      Right lower leg: No edema.      Left lower leg: No edema.   Lymphadenopathy:      Cervical: No cervical adenopathy.   Skin:     General: Skin is warm and dry.      Coloration: Skin is not jaundiced or pale.      Findings: No bruising or rash.   Neurological:      Mental Status: She is alert and oriented to person, place, and time.      Motor: No tremor.      Gait: Gait is intact.   Psychiatric:         Behavior: Behavior normal. Behavior is cooperative.         Cognition and Memory: Cognition normal.       CT Abdomen Pelvis With Contrast  Result Date: 4/25/2023  Severe uncomplicated diverticulitis of the proximal sigmoid colon.  Free fluid in the pelvis.  No abscess. Authenticated and Electronically Signed by Kuldip De La Fuente MD on 04/25/2023 11:15:39 PM    Lab Results   Component Value Date    WBC 12.12 (H) 04/25/2023    HGB 12.8 04/25/2023    HCT 38.6 04/25/2023    MCV 84.5 04/25/2023     04/25/2023       Lab Results   Component Value Date    GLUCOSE 110 (H) 04/25/2023    BUN 8 04/25/2023    CREATININE 0.71 04/25/2023    EGFRIFNONA 89 02/09/2022    EGFRIFAFRI 108 02/09/2022    BCR 11.3 04/25/2023    K 3.7 04/25/2023    CO2 21.0 (L) 04/25/2023    CALCIUM 8.4 (L) 04/25/2023    PROTENTOTREF 6.6 02/09/2022    ALBUMIN 4.0 04/25/2023    LABIL2 2.0 02/09/2022    AST 13 04/25/2023    ALT 13 04/25/2023       Lab Results   Component Value Date    CHLPL 200 03/16/2022    TRIG 91 03/16/2022    HDL 66 (H) 03/16/2022     (H) 03/16/2022    LDLDIRECT 134 10/28/2020       Lab Results   Component Value Date    HGBA1C 5.00 03/16/2022       Lab Results   Component Value Date    TSH 0.958 02/09/2022         Assessment & Plan   Diagnoses and all orders for this visit:    1. Well adult exam (Primary)    2. Chronic fatigue  -     Vitamin B12    3.  Screening for HIV (human immunodeficiency virus)  -     HIV-1 / O / 2 Ag / Antibody 4th Generation    4. Need for hepatitis C screening test  -     Hepatitis C Antibody    5. Screen for STD (sexually transmitted disease)  -     RPR  -     NuSwab VG+ - Swab, Vagina    6. Vaginal discharge  -     NuSwab VG+ - Swab, Vagina    7. Diverticulitis  -     Ambulatory Referral to General Surgery    8. Family history of colonic diverticulitis  -     Ambulatory Referral to General Surgery    Other orders  -     valACYclovir (VALTREX) 500 MG tablet; Take 1 tablet by mouth Daily.  Dispense: 90 tablet; Refill: 3  -     hydrocortisone (ANUSOL-HC) 25 MG suppository; Insert 1 suppository into the rectum 2 (Two) Times a Day As Needed for Hemorrhoids.  Dispense: 12 each; Refill: 0       Age appropriate preventive care reviewed including cancer screenings, safety measures, mental health concerns, supplements, prevention of CV disease and DM, etc. Handout provided.    STD screening due to high risk sexual behavior, h/o STD, etc.    Continue suppressive valtrex.    Fu with general surgery for diverticulitis.    Routine f/u 1 year for annual check up, f/u sooner as needed/instructed.  I will contact patient regarding test results and provide instructions regarding any necessary changes in plan of care.  Patient was encouraged to keep me informed of any acute changes, lack of improvement, or any new concerning symptoms.  Pt is aware of reasons to seek emergent care.  Patient voiced understanding of all instructions and denied further questions.    Please note that portions of this note may have been completed with a voice recognition program.

## 2023-05-05 LAB
HCV IGG SERPL QL IA: NON REACTIVE
HIV 1+2 AB+HIV1 P24 AG SERPL QL IA: NON REACTIVE
RPR SER QL: NON REACTIVE
VIT B12 SERPL-MCNC: 730 PG/ML (ref 211–946)

## 2023-05-07 LAB
A VAGINAE DNA VAG QL NAA+PROBE: NORMAL SCORE
BVAB2 DNA VAG QL NAA+PROBE: NORMAL SCORE
C ALBICANS DNA VAG QL NAA+PROBE: NEGATIVE
C GLABRATA DNA VAG QL NAA+PROBE: NEGATIVE
C TRACH DNA VAG QL NAA+PROBE: NEGATIVE
MEGA1 DNA VAG QL NAA+PROBE: NORMAL SCORE
N GONORRHOEA DNA VAG QL NAA+PROBE: NEGATIVE
T VAGINALIS DNA VAG QL NAA+PROBE: NEGATIVE

## 2023-05-15 NOTE — PROGRESS NOTES
Patient: Leigh Negron    YOB: 1980    Date: 05/16/2023    Primary Care Provider: Marlen Navarrete MD    Chief Complaint   Patient presents with   • Diverticulitis   • Hemorrhoids       SUBJECTIVE:    History of present illness: Patient was seen recently for diverticulitis.  Pain was mainly in the lower abdomen for that.  This has more or less resolved with only mild tenderness.  She has developed epigastric and right upper quadrant pain that she states is stabbing this morning.  Some nausea.  Patient admits to chronic intermittent constipation especially around her menses.  The following portions of the patient's history were reviewed and updated as appropriate: allergies, current medications, past family history, past medical history, past social history, past surgical history and problem list.      Review of Systems   Constitutional: Negative for activity change, chills, fever and unexpected weight change.   HENT: Negative for hearing loss, trouble swallowing and voice change.    Eyes: Negative for visual disturbance.   Respiratory: Negative for apnea, cough, chest tightness, shortness of breath and wheezing.    Cardiovascular: Negative for chest pain, palpitations and leg swelling.   Gastrointestinal: Positive for abdominal pain, constipation and nausea. Negative for abdominal distention, anal bleeding, blood in stool, diarrhea, rectal pain and vomiting.   Endocrine: Negative for cold intolerance and heat intolerance.   Genitourinary: Negative for difficulty urinating, dysuria and flank pain.   Musculoskeletal: Negative for back pain and gait problem.   Skin: Negative for color change, rash and wound.   Neurological: Negative for dizziness, syncope, speech difficulty, weakness, light-headedness, numbness and headaches.   Hematological: Negative for adenopathy. Does not bruise/bleed easily.   Psychiatric/Behavioral: Negative for confusion. The patient is not nervous/anxious.         Allergies:  Allergies   Allergen Reactions   • Augmentin [Amoxicillin-Pot Clavulanate]    • Bactrim [Sulfamethoxazole-Trimethoprim]        Medications:    Current Outpatient Medications:   •  acetaminophen (Tylenol 8 Hour) 650 MG 8 hr tablet, Take 1 tablet by mouth Every 8 (Eight) Hours As Needed for Mild Pain ., Disp: 12 tablet, Rfl: 0  •  amphetamine-dextroamphetamine (ADDERALL) 10 MG tablet, Take  by mouth daily., Disp: , Rfl:   •  cyclobenzaprine (FLEXERIL) 5 MG tablet, Take 1 tablet by mouth At Night As Needed for Muscle Spasms., Disp: 30 tablet, Rfl: 0  •  dicyclomine (BENTYL) 20 MG tablet, Take 1 tablet by mouth Every 6 (Six) Hours As Needed (abdominal cramps)., Disp: 20 tablet, Rfl: 0  •  fluticasone (FLONASE) 50 MCG/ACT nasal spray, , Disp: , Rfl:   •  lisdexamfetamine (VYVANSE) 50 MG capsule, Take  by mouth daily., Disp: , Rfl:   •  ondansetron ODT (ZOFRAN-ODT) 4 MG disintegrating tablet, Place 1 tablet on the tongue Every 6 (Six) Hours As Needed for Nausea or Vomiting., Disp: 20 tablet, Rfl: 0  •  valACYclovir (VALTREX) 500 MG tablet, Take 1 tablet by mouth Daily., Disp: 90 tablet, Rfl: 3  •  hydrocortisone (ANUSOL-HC) 25 MG suppository, Insert 1 suppository into the rectum 2 (Two) Times a Day As Needed for Hemorrhoids. (Patient not taking: Reported on 5/16/2023), Disp: 12 each, Rfl: 0  •  Paragard Intrauterine Copper intrauterine device IUD, 1 each by Intrauterine route 1 (One) Time for 1 dose., Disp: 1 Intra Uterine Device, Rfl: 0    History:  Past Medical History:   Diagnosis Date   • ADHD (attention deficit hyperactivity disorder) 10/2002   • Allergic 03/2002    Had prick test done several years ago. Didn't explain sympto   • Anemia    • Anxiety 10/2006    I have a stressful job   • Depression 06/2018   • Diverticulitis 2023   • Diverticulitis of colon 04/25/2023   • Diverticulosis 04/25/2023    Diagnosed at [facility].   • Exposure to sexually transmitted disease (STD)    • Headache    •  History of anemia    • History of body piercing    • History of ECG 2016   • History of hypertension    • History of kidney infection    • History of migraine    • Hyperlipidemia    • Hypertension    • Low back pain 2016    MRI showed bulging discs at L4 & L5. Treated by Chris Sipple   • Obesity 2019   • PMS (premenstrual syndrome)    • Prehypertension    • Recurrent genital herpes    • Scoliosis 2008    Muscular-induced scoliosis in upper back.   • Visual impairment 2002    I wear glasses       Past Surgical History:   Procedure Laterality Date   • AUGMENTATION MAMMAPLASTY     • BACK SURGERY     • BREAST AUGMENTATION  2016   • BREAST SURGERY  2016    Breast augmentation   •  SECTION     • COSMETIC SURGERY  2016    Breast augmentation   • FOREARM FRACTURE SURGERY     • SPINE SURGERY  2020    Discectomy L5/S1   • UMBILICAL HERNIA REPAIR     • WISDOM TOOTH EXTRACTION         Family History   Problem Relation Age of Onset   • Hyperlipidemia Mother    • Obesity Mother    • Osteoporosis Mother    • Arthritis Mother         Arthritis in foot   • Stroke Father    • Arthritis Father    • Hyperlipidemia Father    • Hypertension Father    • Mental illness Father    • Migraines Father    • Obesity Father    • Cancer Father         Squamous cell carcinoma   • Drug abuse Father         Lifetime marijuana use   • Breast cancer Paternal Grandmother    • Arthritis Paternal Uncle         Rheumatoid arthritis   • Arthritis Other        Social History     Tobacco Use   • Smoking status: Every Day     Types: Electronic Cigarette     Passive exposure: Past   • Smokeless tobacco: Never   • Tobacco comments:     Not sure when i quit exactly. I never felt particularly addicted so i just quit.   Vaping Use   • Vaping Use: Every day   • Substances: Nicotine   Substance Use Topics   • Alcohol use: Yes     Alcohol/week: 4.0 standard drinks     Types: 4 Shots of liquor per week     Comment:  "Occasional alcohol use   • Drug use: No        OBJECTIVE:    Vital Signs:   Vitals:    05/16/23 0936   BP: 120/68   Pulse: 82   Temp: 97.9 °F (36.6 °C)   SpO2: 99%   Weight: 81.2 kg (179 lb)   Height: 175.3 cm (69\")       Physical Exam:   General Appearance:    Alert, cooperative, in no acute distress   Head:    Normocephalic, without obvious abnormality, atraumatic   Eyes:            Normal.  No scleral icterus.  PERRLA    Lungs:     Clear to auscultation,respirations regular, even and                  unlabored    Heart:    Regular rhythm and normal rate, normal S1 and S2, no            murmur   Abdomen:    Soft but with tenderness in the right upper and epigastric area.  No significant lower abdominal tenderness   Extremities:   Moves all extremities well, no edema, no cyanosis, no             redness   Skin:   No bleeding, bruising or rash   Neurologic:   Normal without gross deficits.   Psychiatric: No evidence of depression or anxiety        Results Review:   I reviewed the patient's new clinical results.  CT including films were reviewed and I agree with the interpretation    Review of Systems was reviewed and confirmed as accurate as documented by the MA.    ASSESSMENT/PLAN:    1. Diverticulitis    2. Right upper quadrant abdominal pain        Pain in the right upper quadrant currently resolved and no evidence of gallstones on ultrasound.  If this recurs she should follow-up with me.    Resolved diverticulitis.  I do recommend a colonoscopy to rule out other etiologies.  She understands the reason for this.  I explained the procedure to the patient as well as the risks of bleeding and perforation and they understand the ramifications of these potential complications and they wish to proceed.  We will wait another several weeks prior to proceeding.        Electronically signed by Sanjay Merida MD  05/16/23          "

## 2023-05-16 ENCOUNTER — OFFICE VISIT (OUTPATIENT)
Dept: SURGERY | Facility: CLINIC | Age: 43
End: 2023-05-16
Payer: COMMERCIAL

## 2023-05-16 VITALS
WEIGHT: 179 LBS | HEIGHT: 69 IN | DIASTOLIC BLOOD PRESSURE: 68 MMHG | SYSTOLIC BLOOD PRESSURE: 120 MMHG | OXYGEN SATURATION: 99 % | BODY MASS INDEX: 26.51 KG/M2 | HEART RATE: 82 BPM | TEMPERATURE: 97.9 F

## 2023-05-16 DIAGNOSIS — K57.92 DIVERTICULITIS: Primary | ICD-10-CM

## 2023-05-16 DIAGNOSIS — R10.11 RIGHT UPPER QUADRANT ABDOMINAL PAIN: ICD-10-CM

## 2023-05-16 PROCEDURE — 99204 OFFICE O/P NEW MOD 45 MIN: CPT | Performed by: SURGERY

## 2023-05-16 RX ORDER — POLYETHYLENE GLYCOL 3350 17 G/17G
238 POWDER, FOR SOLUTION ORAL ONCE
Qty: 17 PACKET | Refills: 0 | Status: SHIPPED | OUTPATIENT
Start: 2023-05-16 | End: 2023-05-16

## 2023-05-16 RX ORDER — BISACODYL 5 MG/1
5 TABLET, DELAYED RELEASE ORAL TAKE AS DIRECTED
Qty: 4 TABLET | Refills: 0 | Status: SHIPPED | OUTPATIENT
Start: 2023-05-16 | End: 2024-05-15

## 2023-06-16 ENCOUNTER — TELEPHONE (OUTPATIENT)
Dept: SURGERY | Facility: CLINIC | Age: 43
End: 2023-06-16
Payer: COMMERCIAL

## 2023-10-23 ENCOUNTER — OFFICE VISIT (OUTPATIENT)
Dept: FAMILY MEDICINE CLINIC | Facility: CLINIC | Age: 43
End: 2023-10-23
Payer: COMMERCIAL

## 2023-10-23 VITALS
HEART RATE: 81 BPM | TEMPERATURE: 98.1 F | DIASTOLIC BLOOD PRESSURE: 96 MMHG | WEIGHT: 185.8 LBS | BODY MASS INDEX: 27.52 KG/M2 | OXYGEN SATURATION: 99 % | SYSTOLIC BLOOD PRESSURE: 142 MMHG | HEIGHT: 69 IN

## 2023-10-23 DIAGNOSIS — Z23 NEED FOR INFLUENZA VACCINATION: ICD-10-CM

## 2023-10-23 DIAGNOSIS — Z97.5 IUD (INTRAUTERINE DEVICE) IN PLACE: ICD-10-CM

## 2023-10-23 DIAGNOSIS — N93.8 DUB (DYSFUNCTIONAL UTERINE BLEEDING): Primary | ICD-10-CM

## 2023-10-23 NOTE — PROGRESS NOTES
"Subjective   Leigh Negron is a 43 y.o. female.     History of Present Illness  Menstrual Problem (Patient states this issue began approximately 2 months ago. She states her  menstrual cycle includes very long periods of bleeding with short breaks in between. She states she is experiencing more than one \"period\" monthly. She states she is only experiencing approximately 1 week in between bleeding episodes. Patient states when she begins bleeding this lasts around 3 weeks at a time. )    Pt provided history reviewed as above. Has had constant bleeding x 2 months, occ heavy.    Some mild pelvic pain. No particular tx tried.    Saw Dr. Avila 6/2022    The following portions of the patient's history were reviewed and updated as appropriate: allergies, current medications, past family history, past medical history, past social history, past surgical history, and problem list.    Review of Systems   Constitutional:  Negative for fatigue and fever.   HENT:  Negative for congestion, mouth sores, rhinorrhea, sore throat and trouble swallowing.    Eyes:  Negative for visual disturbance.   Respiratory:  Negative for cough, shortness of breath and wheezing.    Cardiovascular:  Negative for chest pain, palpitations and leg swelling.   Gastrointestinal:  Positive for abdominal pain. Negative for blood in stool, constipation, diarrhea and vomiting.   Endocrine: Negative for polydipsia and polyuria.   Genitourinary:  Positive for menstrual problem and pelvic pain. Negative for dyspareunia, dysuria, frequency, genital sores, hematuria and urgency.   Musculoskeletal:  Positive for arthralgias and myalgias. Negative for back pain.   Skin:  Negative for rash and wound.   Neurological:  Positive for headaches. Negative for dizziness, tremors, syncope and weakness.   Hematological:  Negative for adenopathy. Does not bruise/bleed easily.   Psychiatric/Behavioral:  Positive for sleep disturbance. Negative for confusion and " dysphoric mood. The patient is not nervous/anxious.    Pt's previous ROS reviewed and updated as indicated.       Objective   Vitals:    10/23/23 0940   BP: 142/96   Pulse: 81   Temp: 98.1 °F (36.7 °C)   SpO2: 99%     Body mass index is 27.44 kg/m².      10/23/23  0940   Weight: 84.3 kg (185 lb 12.8 oz)       Physical Exam  Vitals and nursing note reviewed.   Constitutional:       General: She is not in acute distress.     Appearance: She is well-developed, well-groomed and overweight. She is not ill-appearing.   Cardiovascular:      Rate and Rhythm: Normal rate and regular rhythm.      Pulses: Normal pulses.      Heart sounds: Normal heart sounds.   Pulmonary:      Effort: Pulmonary effort is normal.      Breath sounds: Normal breath sounds.   Abdominal:      General: Bowel sounds are normal. There is no distension.      Palpations: Abdomen is soft. There is no mass.      Tenderness: There is abdominal tenderness (mild) in the right lower quadrant. There is no left CVA tenderness, guarding or rebound.   Skin:     General: Skin is warm and dry.      Coloration: Skin is not jaundiced or pale.   Neurological:      Mental Status: She is alert and oriented to person, place, and time.   Psychiatric:         Behavior: Behavior is cooperative.         Assessment & Plan   Diagnoses and all orders for this visit:    1. DUB (dysfunctional uterine bleeding) (Primary)  -     CBC & Differential  -     Iron Profile  -     Ferritin  -     Ambulatory Referral to Gynecology    2. IUD (intrauterine device) in place  -     Ambulatory Referral to Gynecology    3. Need for influenza vaccination    Other orders  -     Fluzone (or Fluarix & Flulaval for VFC) >6 Mos ([id number])       I will contact patient regarding test results and provide instructions regarding any necessary changes in plan of care.  Patient was encouraged to keep me informed of any acute changes, lack of improvement, or any new concerning symptoms.  Pt is aware of  reasons to seek emergent care.  Patient voiced understanding of all instructions and denied further questions.    Please note that portions of this note may have been completed with a voice recognition program.

## 2023-10-24 LAB
BASOPHILS # BLD AUTO: 0.06 10*3/MM3 (ref 0–0.2)
BASOPHILS NFR BLD AUTO: 1.3 % (ref 0–1.5)
EOSINOPHIL # BLD AUTO: 0.09 10*3/MM3 (ref 0–0.4)
EOSINOPHIL NFR BLD AUTO: 2 % (ref 0.3–6.2)
ERYTHROCYTE [DISTWIDTH] IN BLOOD BY AUTOMATED COUNT: 12.5 % (ref 12.3–15.4)
FERRITIN SERPL-MCNC: 16.5 NG/ML (ref 13–150)
HCT VFR BLD AUTO: 42.5 % (ref 34–46.6)
HGB BLD-MCNC: 14 G/DL (ref 12–15.9)
IMM GRANULOCYTES # BLD AUTO: 0.01 10*3/MM3 (ref 0–0.05)
IMM GRANULOCYTES NFR BLD AUTO: 0.2 % (ref 0–0.5)
IRON SATN MFR SERPL: 11 % (ref 20–50)
IRON SERPL-MCNC: 49 MCG/DL (ref 37–145)
LYMPHOCYTES # BLD AUTO: 1.55 10*3/MM3 (ref 0.7–3.1)
LYMPHOCYTES NFR BLD AUTO: 34.4 % (ref 19.6–45.3)
MCH RBC QN AUTO: 29.4 PG (ref 26.6–33)
MCHC RBC AUTO-ENTMCNC: 32.9 G/DL (ref 31.5–35.7)
MCV RBC AUTO: 89.3 FL (ref 79–97)
MONOCYTES # BLD AUTO: 0.36 10*3/MM3 (ref 0.1–0.9)
MONOCYTES NFR BLD AUTO: 8 % (ref 5–12)
NEUTROPHILS # BLD AUTO: 2.43 10*3/MM3 (ref 1.7–7)
NEUTROPHILS NFR BLD AUTO: 54.1 % (ref 42.7–76)
NRBC BLD AUTO-RTO: 0 /100 WBC (ref 0–0.2)
PLATELET # BLD AUTO: 250 10*3/MM3 (ref 140–450)
RBC # BLD AUTO: 4.76 10*6/MM3 (ref 3.77–5.28)
TIBC SERPL-MCNC: 466 MCG/DL
UIBC SERPL-MCNC: 417 MCG/DL (ref 112–346)
WBC # BLD AUTO: 4.5 10*3/MM3 (ref 3.4–10.8)

## 2023-11-03 ENCOUNTER — OFFICE VISIT (OUTPATIENT)
Dept: OBSTETRICS AND GYNECOLOGY | Facility: CLINIC | Age: 43
End: 2023-11-03
Payer: COMMERCIAL

## 2023-11-03 VITALS
WEIGHT: 179 LBS | BODY MASS INDEX: 26.51 KG/M2 | HEIGHT: 69 IN | SYSTOLIC BLOOD PRESSURE: 112 MMHG | DIASTOLIC BLOOD PRESSURE: 72 MMHG

## 2023-11-03 DIAGNOSIS — N92.1 MENORRHAGIA WITH IRREGULAR CYCLE: ICD-10-CM

## 2023-11-03 DIAGNOSIS — Z30.2 ENCOUNTER FOR STERILIZATION: Primary | ICD-10-CM

## 2023-11-03 RX ORDER — SODIUM CHLORIDE 0.9 % (FLUSH) 0.9 %
10 SYRINGE (ML) INJECTION AS NEEDED
OUTPATIENT
Start: 2023-11-03

## 2023-11-03 RX ORDER — SODIUM CHLORIDE 0.9 % (FLUSH) 0.9 %
3 SYRINGE (ML) INJECTION EVERY 12 HOURS SCHEDULED
OUTPATIENT
Start: 2023-11-03

## 2023-11-03 RX ORDER — SODIUM CHLORIDE 9 MG/ML
40 INJECTION, SOLUTION INTRAVENOUS AS NEEDED
OUTPATIENT
Start: 2023-11-03

## 2023-11-03 RX ORDER — SODIUM CHLORIDE 9 MG/ML
40 INJECTION, SOLUTION INTRAVENOUS AS NEEDED
Status: CANCELLED | OUTPATIENT
Start: 2023-11-03

## 2023-11-03 RX ORDER — SODIUM CHLORIDE 0.9 % (FLUSH) 0.9 %
10 SYRINGE (ML) INJECTION AS NEEDED
Status: CANCELLED | OUTPATIENT
Start: 2023-11-03

## 2023-11-03 RX ORDER — MEDROXYPROGESTERONE ACETATE 10 MG/1
10 TABLET ORAL DAILY
Qty: 30 TABLET | Refills: 0 | Status: SHIPPED | OUTPATIENT
Start: 2023-11-03

## 2023-11-03 RX ORDER — SODIUM CHLORIDE 0.9 % (FLUSH) 0.9 %
3 SYRINGE (ML) INJECTION EVERY 12 HOURS SCHEDULED
Status: CANCELLED | OUTPATIENT
Start: 2023-11-03

## 2023-11-03 NOTE — PROGRESS NOTES
Subjective   Chief Complaint   Patient presents with    Follow-up     DUB  Menses lasting longer and happening more often.      Leigh Negron is a 43 y.o. year old .  No LMP recorded. Patient has had an implant.  She presents to be seen because of menometrorrhagia with ParaGard that was placed year and a half ago.  Patient is interested in sterilization as well as think ablation secondary to this forementioned issues..     OTHER COMPLAINTS:  Nothing else    The following portions of the patient's history were reviewed and updated as appropriate:She  has a past medical history of ADHD (attention deficit hyperactivity disorder) (10/2002), Allergic (2002), Anemia, Anxiety (10/2006), Depression (2018), Diverticulitis (), Diverticulitis of colon (2023), Diverticulosis (2023), Exposure to sexually transmitted disease (STD), Headache, History of anemia, History of body piercing, History of ECG (2016), History of hypertension, History of kidney infection, History of migraine, Hyperlipidemia, Hypertension (), Low back pain (2016), Migraine (10/05/2023), Obesity (2019), PMS (premenstrual syndrome), PONV (postoperative nausea and vomiting) (), Prehypertension, Recurrent genital herpes, Scoliosis (2008), Urinary tract infection (), Varicella (), and Visual impairment (2002).  She does not have any pertinent problems on file.  She  has a past surgical history that includes  section; [place] tooth extraction; Forearm fracture surgery; Umbilical hernia repair; Breast surgery (2016); Cosmetic surgery (2016); Back surgery; Augmentation mammaplasty (); Spine surgery (2020); and Breast Augmentation (2016).  Her family history includes Arthritis in her father, mother, paternal uncle, and another family member; Breast cancer in her paternal grandmother; Cancer in her father; Diabetes in her mother; Drug abuse in her father; Hyperlipidemia in her  father and mother; Hypertension in her father; Mental illness in her father; Migraines in her father; Obesity in her father and mother; Osteoporosis in her mother; Stroke in her father.  She  reports that she has been smoking electronic cigarette. She has been exposed to tobacco smoke. She has never used smokeless tobacco. She reports current alcohol use of about 1.0 standard drink of alcohol per week. She reports that she does not use drugs.  Current Outpatient Medications   Medication Sig Dispense Refill    acetaminophen (Tylenol 8 Hour) 650 MG 8 hr tablet Take 1 tablet by mouth Every 8 (Eight) Hours As Needed for Mild Pain . 12 tablet 0    amphetamine-dextroamphetamine (ADDERALL) 10 MG tablet Take  by mouth daily.      cyclobenzaprine (FLEXERIL) 5 MG tablet Take 1 tablet by mouth At Night As Needed for Muscle Spasms. 30 tablet 0    dicyclomine (BENTYL) 20 MG tablet Take 1 tablet by mouth Every 6 (Six) Hours As Needed (abdominal cramps). 20 tablet 0    fluticasone (FLONASE) 50 MCG/ACT nasal spray       lisdexamfetamine (VYVANSE) 50 MG capsule Take  by mouth daily.      valACYclovir (VALTREX) 500 MG tablet Take 1 tablet by mouth Daily. 90 tablet 3    Paragard Intrauterine Copper intrauterine device IUD 1 each by Intrauterine route 1 (One) Time for 1 dose. 1 Intra Uterine Device 0     No current facility-administered medications for this visit.     Current Outpatient Medications on File Prior to Visit   Medication Sig    acetaminophen (Tylenol 8 Hour) 650 MG 8 hr tablet Take 1 tablet by mouth Every 8 (Eight) Hours As Needed for Mild Pain .    amphetamine-dextroamphetamine (ADDERALL) 10 MG tablet Take  by mouth daily.    cyclobenzaprine (FLEXERIL) 5 MG tablet Take 1 tablet by mouth At Night As Needed for Muscle Spasms.    dicyclomine (BENTYL) 20 MG tablet Take 1 tablet by mouth Every 6 (Six) Hours As Needed (abdominal cramps).    fluticasone (FLONASE) 50 MCG/ACT nasal spray     lisdexamfetamine (VYVANSE) 50 MG  "capsule Take  by mouth daily.    valACYclovir (VALTREX) 500 MG tablet Take 1 tablet by mouth Daily.    Paragard Intrauterine Copper intrauterine device IUD 1 each by Intrauterine route 1 (One) Time for 1 dose.     No current facility-administered medications on file prior to visit.     She is allergic to augmentin [amoxicillin-pot clavulanate] and bactrim [sulfamethoxazole-trimethoprim].    Social History    Tobacco Use      Smoking status: Every Day        Types: Electronic Cigarette        Passive exposure: Past      Smokeless tobacco: Never      Tobacco comments: Not sure when i quit exactly. I never felt particularly addicted so i just quit.    Review of Systems  Consitutional POS: nothing reported    NEG: anorexia or night sweats   Gastointestinal POS: nothing reported    NEG: bloating, change in bowel habits, melena, or reflux symptoms   Genitourinary POS: nothing reported    NEG: dysuria or hematuria   Integument POS: nothing reported    NEG: moles that are changing in size, shape, color or rashes   Breast POS: nothing reported    NEG: persistent breast lump, skin dimpling, or nipple discharge         Respiratory: negative  Cardiovascular: negative  GYN:   See HPI          Objective   /72   Ht 175.3 cm (69\")   Wt 81.2 kg (179 lb)   BMI 26.43 kg/m²     General:  well developed; well nourished  no acute distress   Skin:  No suspicious lesions seen   Thyroid: normal to inspection and palpation   Lungs:  breathing is unlabored  clear to auscultation bilaterally   Heart:  regular rate and rhythm, S1, S2 normal, no murmur, click, rub or gallop   Breasts:  Not performed.   Abdomen: soft, non-tender; no masses  no umbilical or inguinal hernias are present  no hepato-splenomegaly   Pelvis: Not performed.     Psychiatric: Alert and oriented ×3, mood and affect appropriate  HEENT: Atraumatic, normocephalic, normal scleral icterus  Extremities: 2+ pulses bilaterally, no edema      Lab Review   No data " reviewed    Imaging   CT Abdomen Pelvis With Contrast (04/25/2023 20:03)        Assessment   Menometrorrhagia  ParaGard surveillance  Desired permanent sterilization     Plan   Hysteroscopy, dilatation curettage, serene ablation, laparoscopic bilateral tubal ligation  R/B A  TVS before    No orders of the defined types were placed in this encounter.         This note was electronically signed.      November 3, 2023

## 2023-11-03 NOTE — H&P (VIEW-ONLY)
Subjective   Chief Complaint   Patient presents with    Follow-up     DUB  Menses lasting longer and happening more often.      Leigh Negron is a 43 y.o. year old .  No LMP recorded. Patient has had an implant.  She presents to be seen because of menometrorrhagia with ParaGard that was placed year and a half ago.  Patient is interested in sterilization as well as think ablation secondary to this forementioned issues..     OTHER COMPLAINTS:  Nothing else    The following portions of the patient's history were reviewed and updated as appropriate:She  has a past medical history of ADHD (attention deficit hyperactivity disorder) (10/2002), Allergic (2002), Anemia, Anxiety (10/2006), Depression (2018), Diverticulitis (), Diverticulitis of colon (2023), Diverticulosis (2023), Exposure to sexually transmitted disease (STD), Headache, History of anemia, History of body piercing, History of ECG (2016), History of hypertension, History of kidney infection, History of migraine, Hyperlipidemia, Hypertension (), Low back pain (2016), Migraine (10/05/2023), Obesity (2019), PMS (premenstrual syndrome), PONV (postoperative nausea and vomiting) (), Prehypertension, Recurrent genital herpes, Scoliosis (2008), Urinary tract infection (), Varicella (), and Visual impairment (2002).  She does not have any pertinent problems on file.  She  has a past surgical history that includes  section; [place] tooth extraction; Forearm fracture surgery; Umbilical hernia repair; Breast surgery (2016); Cosmetic surgery (2016); Back surgery; Augmentation mammaplasty (); Spine surgery (2020); and Breast Augmentation (2016).  Her family history includes Arthritis in her father, mother, paternal uncle, and another family member; Breast cancer in her paternal grandmother; Cancer in her father; Diabetes in her mother; Drug abuse in her father; Hyperlipidemia in her  father and mother; Hypertension in her father; Mental illness in her father; Migraines in her father; Obesity in her father and mother; Osteoporosis in her mother; Stroke in her father.  She  reports that she has been smoking electronic cigarette. She has been exposed to tobacco smoke. She has never used smokeless tobacco. She reports current alcohol use of about 1.0 standard drink of alcohol per week. She reports that she does not use drugs.  Current Outpatient Medications   Medication Sig Dispense Refill    acetaminophen (Tylenol 8 Hour) 650 MG 8 hr tablet Take 1 tablet by mouth Every 8 (Eight) Hours As Needed for Mild Pain . 12 tablet 0    amphetamine-dextroamphetamine (ADDERALL) 10 MG tablet Take  by mouth daily.      cyclobenzaprine (FLEXERIL) 5 MG tablet Take 1 tablet by mouth At Night As Needed for Muscle Spasms. 30 tablet 0    dicyclomine (BENTYL) 20 MG tablet Take 1 tablet by mouth Every 6 (Six) Hours As Needed (abdominal cramps). 20 tablet 0    fluticasone (FLONASE) 50 MCG/ACT nasal spray       lisdexamfetamine (VYVANSE) 50 MG capsule Take  by mouth daily.      valACYclovir (VALTREX) 500 MG tablet Take 1 tablet by mouth Daily. 90 tablet 3    Paragard Intrauterine Copper intrauterine device IUD 1 each by Intrauterine route 1 (One) Time for 1 dose. 1 Intra Uterine Device 0     No current facility-administered medications for this visit.     Current Outpatient Medications on File Prior to Visit   Medication Sig    acetaminophen (Tylenol 8 Hour) 650 MG 8 hr tablet Take 1 tablet by mouth Every 8 (Eight) Hours As Needed for Mild Pain .    amphetamine-dextroamphetamine (ADDERALL) 10 MG tablet Take  by mouth daily.    cyclobenzaprine (FLEXERIL) 5 MG tablet Take 1 tablet by mouth At Night As Needed for Muscle Spasms.    dicyclomine (BENTYL) 20 MG tablet Take 1 tablet by mouth Every 6 (Six) Hours As Needed (abdominal cramps).    fluticasone (FLONASE) 50 MCG/ACT nasal spray     lisdexamfetamine (VYVANSE) 50 MG  "capsule Take  by mouth daily.    valACYclovir (VALTREX) 500 MG tablet Take 1 tablet by mouth Daily.    Paragard Intrauterine Copper intrauterine device IUD 1 each by Intrauterine route 1 (One) Time for 1 dose.     No current facility-administered medications on file prior to visit.     She is allergic to augmentin [amoxicillin-pot clavulanate] and bactrim [sulfamethoxazole-trimethoprim].    Social History    Tobacco Use      Smoking status: Every Day        Types: Electronic Cigarette        Passive exposure: Past      Smokeless tobacco: Never      Tobacco comments: Not sure when i quit exactly. I never felt particularly addicted so i just quit.    Review of Systems  Consitutional POS: nothing reported    NEG: anorexia or night sweats   Gastointestinal POS: nothing reported    NEG: bloating, change in bowel habits, melena, or reflux symptoms   Genitourinary POS: nothing reported    NEG: dysuria or hematuria   Integument POS: nothing reported    NEG: moles that are changing in size, shape, color or rashes   Breast POS: nothing reported    NEG: persistent breast lump, skin dimpling, or nipple discharge         Respiratory: negative  Cardiovascular: negative  GYN:   See HPI          Objective   /72   Ht 175.3 cm (69\")   Wt 81.2 kg (179 lb)   BMI 26.43 kg/m²     General:  well developed; well nourished  no acute distress   Skin:  No suspicious lesions seen   Thyroid: normal to inspection and palpation   Lungs:  breathing is unlabored  clear to auscultation bilaterally   Heart:  regular rate and rhythm, S1, S2 normal, no murmur, click, rub or gallop   Breasts:  Not performed.   Abdomen: soft, non-tender; no masses  no umbilical or inguinal hernias are present  no hepato-splenomegaly   Pelvis: Not performed.     Psychiatric: Alert and oriented ×3, mood and affect appropriate  HEENT: Atraumatic, normocephalic, normal scleral icterus  Extremities: 2+ pulses bilaterally, no edema      Lab Review   No data " reviewed    Imaging   CT Abdomen Pelvis With Contrast (04/25/2023 20:03)        Assessment   Menometrorrhagia  ParaGard surveillance  Desired permanent sterilization     Plan   Hysteroscopy, dilatation curettage, serene ablation, laparoscopic bilateral tubal ligation  R/B A  TVS before    No orders of the defined types were placed in this encounter.         This note was electronically signed.      November 3, 2023

## 2023-11-08 ENCOUNTER — TELEPHONE (OUTPATIENT)
Dept: PREADMISSION TESTING | Facility: HOSPITAL | Age: 43
End: 2023-11-08

## 2023-11-09 ENCOUNTER — PRE-ADMISSION TESTING (OUTPATIENT)
Dept: PREADMISSION TESTING | Facility: HOSPITAL | Age: 43
End: 2023-11-09
Payer: COMMERCIAL

## 2023-11-09 ENCOUNTER — TELEPHONE (OUTPATIENT)
Dept: OBSTETRICS AND GYNECOLOGY | Facility: CLINIC | Age: 43
End: 2023-11-09
Payer: COMMERCIAL

## 2023-11-09 DIAGNOSIS — Z30.2 ENCOUNTER FOR STERILIZATION: ICD-10-CM

## 2023-11-09 DIAGNOSIS — N92.1 MENORRHAGIA WITH IRREGULAR CYCLE: ICD-10-CM

## 2023-11-09 LAB
BILIRUB UR QL STRIP: NEGATIVE
CLARITY UR: ABNORMAL
COLOR UR: YELLOW
GLUCOSE UR STRIP-MCNC: NEGATIVE MG/DL
HGB UR QL STRIP.AUTO: NEGATIVE
KETONES UR QL STRIP: NEGATIVE
LEUKOCYTE ESTERASE UR QL STRIP.AUTO: NEGATIVE
NITRITE UR QL STRIP: NEGATIVE
PH UR STRIP.AUTO: 8 [PH] (ref 5–8)
PROT UR QL STRIP: NEGATIVE
SP GR UR STRIP: 1.02 (ref 1–1.03)
UROBILINOGEN UR QL STRIP: ABNORMAL

## 2023-11-09 PROCEDURE — 81003 URINALYSIS AUTO W/O SCOPE: CPT

## 2023-11-09 PROCEDURE — 85025 COMPLETE CBC W/AUTO DIFF WBC: CPT | Performed by: OBSTETRICS & GYNECOLOGY

## 2023-11-09 RX ORDER — IBUPROFEN 800 MG/1
800 TABLET ORAL EVERY 8 HOURS PRN
COMMUNITY

## 2023-11-09 NOTE — DISCHARGE INSTRUCTIONS
Introduction to anesthesia video viewed by pt in PAT.      PAT PASS GIVEN/REVIEWED WITH PT.  VERBALIZED UNDERSTANDING OF THE FOLLOWING:  DO NOT EAT, DRINK, SMOKE, USE SMOKELESS TOBACCO OR CHEW GUM AFTER MIDNIGHT THE NIGHT BEFORE SURGERY.  THIS ALSO INCLUDES HARD CANDIES AND MINTS.    DO NOT SHAVE THE AREA TO BE OPERATED ON AT LEAST 48 HOURS PRIOR TO THE PROCEDURE.  DO NOT WEAR MAKE UP OR NAIL POLISH.  DO NOT LEAVE IN ANY PIERCING OR WEAR JEWELRY THE DAY OF SURGERY.      DO NOT USE ADHESIVES IF YOU WEAR DENTURES.    DO NOT WEAR EYE CONTACTS; BRING IN YOUR GLASSES.    ONLY TAKE MEDICATION THE MORNING OF YOUR PROCEDURE IF INSTRUCTED BY YOUR SURGEON WITH ENOUGH WATER TO SWALLOW THE MEDICATION.  IF YOUR SURGEON DID NOT SPECIFY WHICH MEDICATIONS TO TAKE, YOU WILL NEED TO CALL THEIR OFFICE FOR FURTHER INSTRUCTIONS AND DO AS THEY INSTRUCT.    LEAVE ANYTHING YOU CONSIDER VALUABLE AT HOME.    YOU WILL NEED TO ARRANGE FOR SOMEONE TO DRIVE YOU HOME AFTER SURGERY.  IT IS RECOMMENDED THAT YOU DO NOT DRIVE, WORK, DRINK ALCOHOL OR MAKE MAJOR DECISIONS FOR AT LEAST 24 HOURS AFTER YOUR PROCEDURE IS COMPLETE.      THE DAY OF YOUR PROCEDURE, BRING IN THE FOLLOWING IF APPLICABLE:   PICTURE ID AND INSURANCE/MEDICARE OR MEDICAID CARDS/ANY CO-PAY THAT MAY BE DUE   COPY OF ADVANCED DIRECTIVE/LIVING WILL/POWER OR    CPAP/BIPAP/INHALERS   SKIN PREP SHEET   YOUR PREADMISSION TESTING PASS (IF NOT A PHONE HISTORY)     Chlorhexidine wipes along with instruction/verification sheet given to pt.  Instructed pt to date, time, and initial the verification sheet once skin prep has been  completed, and to return [facility] the day of the procedure.  Pt. Verbalizes understanding.      Medication instructions given to pt by RN per anesthesia protocol.  Pt referred back to surgeon for further instructions if he/she is on any blood thinners.

## 2023-11-09 NOTE — TELEPHONE ENCOUNTER
----- Message from Marlen Nation RN sent at 11/9/2023 12:58 PM EST -----  Hi I just wanted to inform you Leigh is currently not feeling well. She was in PAT today with sinus congestion and fatigue. Stated her daughter tested positive for COVID approximately one week ago. Encouraged to go to urgent care or PCP if symptoms worsen or do not improve. Instructed to call Dr. Avila's office next week if she is still symptomatic.

## 2023-11-09 NOTE — NURSING NOTE
Pt in PAT for upcoming procedure scheduled for 11/20/23 with Dr. Avila. States daughter tested positive for COVID approximately one week ago and she began having sinus congestion and fatigue on 11/5/23. Pt encouraged to be evaluated by PCP/urgent care if symptoms worsen or fail to improve in next 24-48 hours. Instructed to inform Dr. Avila's office next week if still symptomatic. Sent message via [employer] to Dr. Avila's office.

## 2023-11-20 ENCOUNTER — HOSPITAL ENCOUNTER (OUTPATIENT)
Facility: HOSPITAL | Age: 43
Setting detail: HOSPITAL OUTPATIENT SURGERY
Discharge: HOME | End: 2023-11-20
Attending: OBSTETRICS & GYNECOLOGY | Admitting: OBSTETRICS & GYNECOLOGY
Payer: COMMERCIAL

## 2023-11-20 ENCOUNTER — ANESTHESIA EVENT (OUTPATIENT)
Dept: PERIOP | Facility: HOSPITAL | Age: 43
End: 2023-11-20
Payer: COMMERCIAL

## 2023-11-20 ENCOUNTER — ANESTHESIA (OUTPATIENT)
Dept: PERIOP | Facility: HOSPITAL | Age: 43
End: 2023-11-20
Payer: COMMERCIAL

## 2023-11-20 VITALS
DIASTOLIC BLOOD PRESSURE: 90 MMHG | HEART RATE: 79 BPM | TEMPERATURE: 97.9 F | RESPIRATION RATE: 12 BRPM | SYSTOLIC BLOOD PRESSURE: 127 MMHG | OXYGEN SATURATION: 96 %

## 2023-11-20 DIAGNOSIS — N92.1 MENORRHAGIA WITH IRREGULAR CYCLE: ICD-10-CM

## 2023-11-20 DIAGNOSIS — Z30.2 ENCOUNTER FOR STERILIZATION: ICD-10-CM

## 2023-11-20 PROCEDURE — 58563 HYSTEROSCOPY ABLATION: CPT | Performed by: OBSTETRICS & GYNECOLOGY

## 2023-11-20 PROCEDURE — 25010000002 METOCLOPRAMIDE PER 10 MG: Performed by: NURSE ANESTHETIST, CERTIFIED REGISTERED

## 2023-11-20 PROCEDURE — 25010000002 HYDROMORPHONE 1 MG/ML SOLUTION: Performed by: NURSE ANESTHETIST, CERTIFIED REGISTERED

## 2023-11-20 PROCEDURE — 25010000002 ONDANSETRON PER 1 MG: Performed by: NURSE ANESTHETIST, CERTIFIED REGISTERED

## 2023-11-20 PROCEDURE — 25010000002 HYDRALAZINE PER 20 MG: Performed by: NURSE ANESTHETIST, CERTIFIED REGISTERED

## 2023-11-20 PROCEDURE — 25010000002 MIDAZOLAM PER 1MG: Performed by: NURSE ANESTHETIST, CERTIFIED REGISTERED

## 2023-11-20 PROCEDURE — 58671 LAPAROSCOPY TUBAL BLOCK: CPT | Performed by: OBSTETRICS & GYNECOLOGY

## 2023-11-20 PROCEDURE — 25010000002 DEXAMETHASONE PER 1 MG: Performed by: NURSE ANESTHETIST, CERTIFIED REGISTERED

## 2023-11-20 PROCEDURE — 25010000002 LABETALOL 5 MG/ML SOLUTION: Performed by: NURSE ANESTHETIST, CERTIFIED REGISTERED

## 2023-11-20 PROCEDURE — 25010000002 PROPOFOL 10 MG/ML EMULSION: Performed by: NURSE ANESTHETIST, CERTIFIED REGISTERED

## 2023-11-20 PROCEDURE — 58301 REMOVE INTRAUTERINE DEVICE: CPT | Performed by: OBSTETRICS & GYNECOLOGY

## 2023-11-20 PROCEDURE — 25010000002 FENTANYL CITRATE (PF) 100 MCG/2ML SOLUTION: Performed by: NURSE ANESTHETIST, CERTIFIED REGISTERED

## 2023-11-20 PROCEDURE — 25010000002 SUGAMMADEX 500 MG/5ML SOLUTION: Performed by: NURSE ANESTHETIST, CERTIFIED REGISTERED

## 2023-11-20 PROCEDURE — 25810000003 LACTATED RINGERS PER 1000 ML: Performed by: NURSE ANESTHETIST, CERTIFIED REGISTERED

## 2023-11-20 PROCEDURE — 25810000003 LACTATED RINGERS PER 1000 ML: Performed by: OBSTETRICS & GYNECOLOGY

## 2023-11-20 PROCEDURE — 81025 URINE PREGNANCY TEST: CPT | Performed by: OBSTETRICS & GYNECOLOGY

## 2023-11-20 DEVICE — CLIP FALLOP FILSHIE TI PR STRL BX/20: Type: IMPLANTABLE DEVICE | Site: FALLOPIAN TUBE | Status: FUNCTIONAL

## 2023-11-20 RX ORDER — MIDAZOLAM HYDROCHLORIDE 2 MG/2ML
INJECTION, SOLUTION INTRAMUSCULAR; INTRAVENOUS AS NEEDED
Status: DISCONTINUED | OUTPATIENT
Start: 2023-11-20 | End: 2023-11-20 | Stop reason: SURG

## 2023-11-20 RX ORDER — ONDANSETRON 2 MG/ML
INJECTION INTRAMUSCULAR; INTRAVENOUS AS NEEDED
Status: DISCONTINUED | OUTPATIENT
Start: 2023-11-20 | End: 2023-11-20 | Stop reason: SURG

## 2023-11-20 RX ORDER — OXYCODONE HYDROCHLORIDE AND ACETAMINOPHEN 5; 325 MG/1; MG/1
1 TABLET ORAL EVERY 6 HOURS PRN
Qty: 6 TABLET | Refills: 0 | Status: SHIPPED | OUTPATIENT
Start: 2023-11-20

## 2023-11-20 RX ORDER — ONDANSETRON 2 MG/ML
4 INJECTION INTRAMUSCULAR; INTRAVENOUS ONCE
Status: DISCONTINUED | OUTPATIENT
Start: 2023-11-20 | End: 2023-11-20 | Stop reason: HOSPADM

## 2023-11-20 RX ORDER — IPRATROPIUM BROMIDE AND ALBUTEROL SULFATE 2.5; .5 MG/3ML; MG/3ML
3 SOLUTION RESPIRATORY (INHALATION) ONCE
Status: DISCONTINUED | OUTPATIENT
Start: 2023-11-20 | End: 2023-11-20 | Stop reason: HOSPADM

## 2023-11-20 RX ORDER — DEXAMETHASONE SODIUM PHOSPHATE 4 MG/ML
INJECTION, SOLUTION INTRA-ARTICULAR; INTRALESIONAL; INTRAMUSCULAR; INTRAVENOUS; SOFT TISSUE AS NEEDED
Status: DISCONTINUED | OUTPATIENT
Start: 2023-11-20 | End: 2023-11-20 | Stop reason: SURG

## 2023-11-20 RX ORDER — MAGNESIUM HYDROXIDE 1200 MG/15ML
LIQUID ORAL AS NEEDED
Status: DISCONTINUED | OUTPATIENT
Start: 2023-11-20 | End: 2023-11-20 | Stop reason: HOSPADM

## 2023-11-20 RX ORDER — PROPOFOL 10 MG/ML
VIAL (ML) INTRAVENOUS AS NEEDED
Status: DISCONTINUED | OUTPATIENT
Start: 2023-11-20 | End: 2023-11-20 | Stop reason: SURG

## 2023-11-20 RX ORDER — LABETALOL HYDROCHLORIDE 5 MG/ML
10 INJECTION, SOLUTION INTRAVENOUS ONCE
Status: COMPLETED | OUTPATIENT
Start: 2023-11-20 | End: 2023-11-20

## 2023-11-20 RX ORDER — SODIUM CHLORIDE, SODIUM LACTATE, POTASSIUM CHLORIDE, CALCIUM CHLORIDE 600; 310; 30; 20 MG/100ML; MG/100ML; MG/100ML; MG/100ML
INJECTION, SOLUTION INTRAVENOUS CONTINUOUS PRN
Status: DISCONTINUED | OUTPATIENT
Start: 2023-11-20 | End: 2023-11-20 | Stop reason: SURG

## 2023-11-20 RX ORDER — IBUPROFEN 600 MG/1
600 TABLET ORAL EVERY 6 HOURS PRN
Qty: 30 TABLET | Refills: 1 | Status: SHIPPED | OUTPATIENT
Start: 2023-11-20

## 2023-11-20 RX ORDER — SODIUM CHLORIDE, SODIUM LACTATE, POTASSIUM CHLORIDE, CALCIUM CHLORIDE 600; 310; 30; 20 MG/100ML; MG/100ML; MG/100ML; MG/100ML
1000 INJECTION, SOLUTION INTRAVENOUS CONTINUOUS
Status: DISCONTINUED | OUTPATIENT
Start: 2023-11-20 | End: 2023-11-20 | Stop reason: HOSPADM

## 2023-11-20 RX ORDER — LORAZEPAM 2 MG/ML
1 INJECTION INTRAMUSCULAR ONCE
Status: DISCONTINUED | OUTPATIENT
Start: 2023-11-20 | End: 2023-11-20 | Stop reason: HOSPADM

## 2023-11-20 RX ORDER — SCOLOPAMINE TRANSDERMAL SYSTEM 1 MG/1
1 PATCH, EXTENDED RELEASE TRANSDERMAL ONCE
Status: DISCONTINUED | OUTPATIENT
Start: 2023-11-20 | End: 2023-11-20 | Stop reason: HOSPADM

## 2023-11-20 RX ORDER — KETAMINE HCL IN NACL, ISO-OSM 100MG/10ML
SYRINGE (ML) INJECTION AS NEEDED
Status: DISCONTINUED | OUTPATIENT
Start: 2023-11-20 | End: 2023-11-20 | Stop reason: SURG

## 2023-11-20 RX ORDER — SODIUM CHLORIDE 0.9 % (FLUSH) 0.9 %
3 SYRINGE (ML) INJECTION EVERY 12 HOURS SCHEDULED
Status: DISCONTINUED | OUTPATIENT
Start: 2023-11-20 | End: 2023-11-20 | Stop reason: HOSPADM

## 2023-11-20 RX ORDER — SODIUM CHLORIDE 0.9 % (FLUSH) 0.9 %
10 SYRINGE (ML) INJECTION AS NEEDED
Status: DISCONTINUED | OUTPATIENT
Start: 2023-11-20 | End: 2023-11-20 | Stop reason: HOSPADM

## 2023-11-20 RX ORDER — LABETALOL HYDROCHLORIDE 5 MG/ML
INJECTION, SOLUTION INTRAVENOUS AS NEEDED
Status: DISCONTINUED | OUTPATIENT
Start: 2023-11-20 | End: 2023-11-20 | Stop reason: SURG

## 2023-11-20 RX ORDER — ROCURONIUM BROMIDE 10 MG/ML
INJECTION, SOLUTION INTRAVENOUS AS NEEDED
Status: DISCONTINUED | OUTPATIENT
Start: 2023-11-20 | End: 2023-11-20 | Stop reason: SURG

## 2023-11-20 RX ORDER — ENALAPRILAT 1.25 MG/ML
1.25 INJECTION INTRAVENOUS ONCE
Status: COMPLETED | OUTPATIENT
Start: 2023-11-20 | End: 2023-11-20

## 2023-11-20 RX ORDER — ENALAPRILAT 1.25 MG/ML
INJECTION INTRAVENOUS AS NEEDED
Status: DISCONTINUED | OUTPATIENT
Start: 2023-11-20 | End: 2023-11-20 | Stop reason: SURG

## 2023-11-20 RX ORDER — HYDRALAZINE HYDROCHLORIDE 20 MG/ML
5 INJECTION INTRAMUSCULAR; INTRAVENOUS ONCE
Status: COMPLETED | OUTPATIENT
Start: 2023-11-20 | End: 2023-11-20

## 2023-11-20 RX ORDER — LIDOCAINE HYDROCHLORIDE 20 MG/ML
INJECTION, SOLUTION EPIDURAL; INFILTRATION; INTRACAUDAL; PERINEURAL AS NEEDED
Status: DISCONTINUED | OUTPATIENT
Start: 2023-11-20 | End: 2023-11-20 | Stop reason: SURG

## 2023-11-20 RX ORDER — FENTANYL CITRATE 50 UG/ML
INJECTION, SOLUTION INTRAMUSCULAR; INTRAVENOUS AS NEEDED
Status: DISCONTINUED | OUTPATIENT
Start: 2023-11-20 | End: 2023-11-20 | Stop reason: SURG

## 2023-11-20 RX ORDER — SODIUM CHLORIDE 9 MG/ML
40 INJECTION, SOLUTION INTRAVENOUS AS NEEDED
Status: DISCONTINUED | OUTPATIENT
Start: 2023-11-20 | End: 2023-11-20 | Stop reason: HOSPADM

## 2023-11-20 RX ORDER — METOCLOPRAMIDE HYDROCHLORIDE 5 MG/ML
INJECTION INTRAMUSCULAR; INTRAVENOUS AS NEEDED
Status: DISCONTINUED | OUTPATIENT
Start: 2023-11-20 | End: 2023-11-20 | Stop reason: SURG

## 2023-11-20 RX ADMIN — ROCURONIUM BROMIDE 10 MG: 10 INJECTION, SOLUTION INTRAVENOUS at 07:30

## 2023-11-20 RX ADMIN — PROPOFOL 150 MG: 10 INJECTION, EMULSION INTRAVENOUS at 07:32

## 2023-11-20 RX ADMIN — HYDROMORPHONE HYDROCHLORIDE 0.5 MG: 1 INJECTION, SOLUTION INTRAMUSCULAR; INTRAVENOUS; SUBCUTANEOUS at 08:38

## 2023-11-20 RX ADMIN — FENTANYL CITRATE 50 MCG: 50 INJECTION INTRAMUSCULAR; INTRAVENOUS at 07:29

## 2023-11-20 RX ADMIN — HYDROMORPHONE HYDROCHLORIDE 0.5 MG: 1 INJECTION, SOLUTION INTRAMUSCULAR; INTRAVENOUS; SUBCUTANEOUS at 08:56

## 2023-11-20 RX ADMIN — ONDANSETRON 4 MG: 2 INJECTION INTRAMUSCULAR; INTRAVENOUS at 07:50

## 2023-11-20 RX ADMIN — Medication 25 MG: at 07:33

## 2023-11-20 RX ADMIN — ENALAPRILAT 1.25 MCG: 1.25 INJECTION, SOLUTION INTRAVENOUS at 07:33

## 2023-11-20 RX ADMIN — HYDROMORPHONE HYDROCHLORIDE 0.5 MG: 1 INJECTION, SOLUTION INTRAMUSCULAR; INTRAVENOUS; SUBCUTANEOUS at 09:32

## 2023-11-20 RX ADMIN — MIDAZOLAM HYDROCHLORIDE 2 MG: 1 INJECTION, SOLUTION INTRAMUSCULAR; INTRAVENOUS at 07:13

## 2023-11-20 RX ADMIN — ENALAPRILAT 1.25 MG: 1.25 INJECTION INTRAVENOUS at 09:37

## 2023-11-20 RX ADMIN — SODIUM CHLORIDE, POTASSIUM CHLORIDE, SODIUM LACTATE AND CALCIUM CHLORIDE: 600; 310; 30; 20 INJECTION, SOLUTION INTRAVENOUS at 07:50

## 2023-11-20 RX ADMIN — SODIUM CHLORIDE, POTASSIUM CHLORIDE, SODIUM LACTATE AND CALCIUM CHLORIDE: 600; 310; 30; 20 INJECTION, SOLUTION INTRAVENOUS at 07:08

## 2023-11-20 RX ADMIN — SCOLOPAMINE TRANSDERMAL SYSTEM 1 PATCH: 1 PATCH, EXTENDED RELEASE TRANSDERMAL at 07:06

## 2023-11-20 RX ADMIN — LABETALOL HYDROCHLORIDE 10 MG: 5 INJECTION, SOLUTION INTRAVENOUS at 07:06

## 2023-11-20 RX ADMIN — SUGAMMADEX 500 MG: 100 INJECTION, SOLUTION INTRAVENOUS at 08:01

## 2023-11-20 RX ADMIN — HYDRALAZINE HYDROCHLORIDE 5 MG: 20 INJECTION, SOLUTION INTRAMUSCULAR; INTRAVENOUS at 09:12

## 2023-11-20 RX ADMIN — GLYCOPYRROLATE 0.2 MCG: 0.2 INJECTION, SOLUTION INTRAMUSCULAR; INTRAVITREAL at 07:18

## 2023-11-20 RX ADMIN — SODIUM CHLORIDE, POTASSIUM CHLORIDE, SODIUM LACTATE AND CALCIUM CHLORIDE 1000 ML: 600; 310; 30; 20 INJECTION, SOLUTION INTRAVENOUS at 07:05

## 2023-11-20 RX ADMIN — METOCLOPRAMIDE 5 MG: 5 INJECTION, SOLUTION INTRAMUSCULAR; INTRAVENOUS at 07:18

## 2023-11-20 RX ADMIN — Medication 25 MG: at 07:41

## 2023-11-20 RX ADMIN — FENTANYL CITRATE 50 MCG: 50 INJECTION INTRAMUSCULAR; INTRAVENOUS at 07:35

## 2023-11-20 RX ADMIN — HYDROMORPHONE HYDROCHLORIDE 0.5 MG: 1 INJECTION, SOLUTION INTRAMUSCULAR; INTRAVENOUS; SUBCUTANEOUS at 08:22

## 2023-11-20 RX ADMIN — LABETALOL HYDROCHLORIDE 10 MG: 5 INJECTION, SOLUTION INTRAVENOUS at 07:17

## 2023-11-20 RX ADMIN — HYDRALAZINE HYDROCHLORIDE 5 MG: 20 INJECTION, SOLUTION INTRAMUSCULAR; INTRAVENOUS at 08:35

## 2023-11-20 RX ADMIN — DEXAMETHASONE SODIUM PHOSPHATE 8 MG: 4 INJECTION, SOLUTION INTRA-ARTICULAR; INTRALESIONAL; INTRAMUSCULAR; INTRAVENOUS; SOFT TISSUE at 07:49

## 2023-11-20 RX ADMIN — LIDOCAINE HYDROCHLORIDE 40 MG: 20 INJECTION, SOLUTION EPIDURAL; INFILTRATION; INTRACAUDAL; PERINEURAL at 07:31

## 2023-11-20 RX ADMIN — ROCURONIUM BROMIDE 40 MG: 10 INJECTION, SOLUTION INTRAVENOUS at 07:32

## 2023-11-20 NOTE — DISCHARGE INSTRUCTIONS
No pushing, pulling, tugging,  heavy lifting, or strenuous activity.  No major decision making, driving, or drinking alcoholic beverages for 24 hours. ( due to the medications you have  received)  Always use good hand hygiene/washing techniques.  NO driving while taking pain medications.    * if you have an incision:  Check your incision area every day for signs of infection.   Check for:  * more redness, swelling, or pain  *more fluid or blood  *warmth  *pus or bad smell.    To assist you in voiding:  Drink plenty of fluids  Listen to running water while attempting to void.    If you are unable to urinate and you have an uncomfortable urge to void or it has been   6 hours since you were discharged, return to the Emergency Room.    May splint incision site when moving, coughing, sneezing, laughing, and increasing activity as comfort measure.      Pelvic rest is best described as not putting anything in your vagina. This includes tampons, douching, tub bathing or sexual activity.

## 2023-11-20 NOTE — OP NOTE
Damon Negron  : 1980  MRN: [id number]  CSN: [id number]  Date: 2023    Operative Report    TUBAL FALLOPE FILSHIE CLIPPING LAPAROSCOPIC/hysteroscopy, dilatation curettage, Cerene ablation with removal of ParaGard IUD    Pre-op Diagnosis:  Encounter for sterilization [Z30.2]  Menorrhagia with irregular cycle [N92.1]   Post-op Diagnosis:  Post-Op Diagnosis Codes:     * Encounter for sterilization [Z30.2]     * Menorrhagia with irregular cycle [N92.1]   Procedure: Procedure(s):  TUBAL FALLOPE FILSHIE CLIPPING LAPAROSCOPIC, hysteroscopy, dilatation curettage, removal of ParaGard IUD,cerene ablation   Surgeon: TOSHA Avila M.D.   Assist: staff   Anesthesia: General   Estimated Blood Loss: <% mls   ABx: none   Specimens:  Endometrial curettings   Findings: Uterus normal.  Posteriorly prominence associate with fibroid noted on ultrasound noted.  No endometriosis.  No adhesions of merit.  Very minor anterior adhesions from prior  in the anterior cul-de-sac.  Normal ovaries.  Normal upper GI viscera.  Normal uterine cavity.  Uterus sounded to 10 cm.  Mildly retroverted.  Overall cavity length 5 cm   Complications: none   Indications: Menometrorrhagia desired permit sterilization.  Risks benefits and alternatives discussed all questions answered.     Description of Procedure:  After the appropriate time out and after adequate dosing of her anesthesia, the patient was prepped and draped in the usual sterile fashion.  A landeros catheter had been placed in the bladder for drainage during the procedure.  She was placed in the dorsal lithotomy position using Albert stirrups.  A weighted speculum was placed in the vagina.  The anterior lip of the cervix was grasped with a single tooth tenaculum.  An acorn cannula was placed in the cervix and used for manipulation during the procedure.  A 2 cm infraumbilical skin incision was made with a knife.  A 10 mm trocar was inserted under direct  visualization using the Optiview without any complications.  The abdomen was insufflated with CO2 making sure to keep the pressure less than 15 mmHg.  The patient was placed in the Trendelenburg position.  An ancillary 8 mm trocar was placed right lower quadrant after identification of the inferior gastric vessels and under direct visualization without any complications.  The pelvis was explored with the above findings noted.  The left fallopian tube was identified by its fimbriated end.  The Filshie clip was placed perpendicular to the infundibular portion of the tube x2 in close approximation without incident.  The same procedure was performed on the contralateral tube.  Repeat inspection revealed clip placement was intact.  The right lower quadrant trocar was removed.  The abdomen was released of CO2.  Inspection revealed adequate hemostasis and no fluid in the surgical field.  The umbilical port was removed as well.  The skin incisions were noted to be hemostatic with minimal bovie electrocautery.  The skin was closed with 4-0 nylon in an interrupted fashion.  Attention was turned vaginally.  Weighted speculum placed.  Anterior cervical lip grasped with single-tooth tenaculum.  ParaGard IUD retrieved removed intact without adherence.  Uterus sounded to 10 cm mildly retroverted.  Progressive dilatation with Hegar dilators carried out.  Sharp circumferential curettage was carried out after hysteroscopic examination under normal saline distention media.  3 device taken of its packaging.  Placed endometrial canal.  Retracted 2.5 cm from fundus.  Safety checks employed x2 successfully passed.  Employed for usual time and cooldown.  Device then retrieved intact without complication.  The cervical tenaculum was removed and the cervix was noted to be hemostatic.  The patient tolerated the procedure well.  There were no complications.  All instruments and sponge counts were correct at the end of the procedure.  She was  taken to postoperative recovery room in stable condition.      TOSHA Avila M.D.  11/20/2023  08:06 EST

## 2023-11-20 NOTE — SIGNIFICANT NOTE
1100- Dr. Avila at bedside. Spoke with pt. Bp diastolic at 97,  Instructed to discharge to home, encouraged pt to follow up with primary MD for evaluation of elevated BP. Pt. Verbalizes understanding.

## 2023-11-20 NOTE — SIGNIFICANT NOTE
1155- discharged to home, accompanied to waiting veh. Per RN. All belongings with pt, IS, purse, written materials,  and clothing.

## 2023-11-20 NOTE — ANESTHESIA PROCEDURE NOTES
Airway  Urgency: elective    Date/Time: 11/20/2023 7:17 AM  Airway not difficult    General Information and Staff    Patient location during procedure: OR  CRNA/CAA: Steven Monterroso CRNA    Indications and Patient Condition  Indications for airway management: airway protection    Preoxygenated: yes  Mask difficulty assessment: 1 - vent by mask    Final Airway Details  Final airway type: endotracheal airway      Successful airway: ETT  Cuffed: yes   Successful intubation technique: direct laryngoscopy  Facilitating devices/methods: intubating stylet  Endotracheal tube insertion site: oral  Blade: Rupert  Blade size: 3  ETT size (mm): 7.5  Cormack-Lehane Classification: grade IIa - partial view of glottis  Placement verified by: chest auscultation, capnometry and palpation of cuff   Inital cuff pressure (cm H2O): 0  Cuff volume (mL): 5  Measured from: lips  ETT/EBT  to lips (cm): 21  Number of attempts at approach: 1  Assessment: lips, teeth, and gum same as pre-op and atraumatic intubation    Additional Comments  Intubated by dvnt, cuff up with mov, bbs and expansion =, + etco, taped at lips, tolerated induction and intubation without adverse rx.

## 2023-11-20 NOTE — ANESTHESIA POSTPROCEDURE EVALUATION
Patient: Leigh Negron    Procedure Summary       Date: 11/20/23 Room / Location: [facility] OR 1 /  [facility]    Anesthesia Start: 0717 Anesthesia Stop:     Procedure: TUBAL FALLOPE FILSHIE CLIPPING LAPAROSCOPIC, hysteroscopy, dilatation curettage, removal of ParaGard IUD,cerene ablation (Abdomen) Diagnosis:       Encounter for sterilization      Menorrhagia with irregular cycle      (Encounter for sterilization [Z30.2])      (Menorrhagia with irregular cycle [N92.1])    Surgeons: Steven Avila MD Provider: Steven Monterroso CRNA    Anesthesia Type: general ASA Status: 3            Anesthesia Type: general    Vitals  No vitals data found for the desired time range.          Post Anesthesia Care and Evaluation    Patient location during evaluation: PACU  Patient participation: complete - patient participated  Level of consciousness: awake  Pain score: 0  Pain management: adequate    Airway patency: patent  Anesthetic complications: No anesthetic complications  PONV Status: none  Cardiovascular status: acceptable  Respiratory status: acceptable, face mask and spontaneous ventilation  Hydration status: acceptable    Comments: See R.N. note for postop vital signs.vsss resp spont, reflexes intact, responsive, report given to pacu nurse

## 2023-11-22 LAB — REF LAB TEST METHOD: NORMAL

## 2023-11-28 ENCOUNTER — OFFICE VISIT (OUTPATIENT)
Dept: OBSTETRICS AND GYNECOLOGY | Facility: CLINIC | Age: 43
End: 2023-11-28
Payer: COMMERCIAL

## 2023-11-28 VITALS
DIASTOLIC BLOOD PRESSURE: 96 MMHG | WEIGHT: 179.8 LBS | BODY MASS INDEX: 26.63 KG/M2 | HEIGHT: 69 IN | SYSTOLIC BLOOD PRESSURE: 144 MMHG

## 2023-11-28 DIAGNOSIS — Z09 POSTOP CHECK: Primary | ICD-10-CM

## 2023-11-28 PROCEDURE — 99024 POSTOP FOLLOW-UP VISIT: CPT | Performed by: OBSTETRICS & GYNECOLOGY

## 2023-11-28 NOTE — PROGRESS NOTES
Subjective   Chief Complaint   Patient presents with    Post-op     8 days post op tubal     Leigh Negron is a 43 y.o. year old .  Patient's last menstrual period was 2023 (approximate).  She presents to be seen because of post op check, BTL and Cerene.     OTHER COMPLAINTS:  Nothing else    The following portions of the patient's history were reviewed and updated as appropriate:She  has a past medical history of ADHD (attention deficit hyperactivity disorder) (10/2002), Allergic (2002), Anemia, Anxiety (10/2006), Depression (2018), Diverticulitis (), Diverticulitis of colon (2023), Diverticulosis (2023), Exposure to sexually transmitted disease (STD), Headache, History of anemia, History of body piercing, History of ECG (2016), History of hypertension, History of kidney infection, History of migraine, Hyperlipidemia, Hypertension (), Low back pain (2016), Migraine (10/05/2023), Obesity (2019), PMS (premenstrual syndrome), PONV (postoperative nausea and vomiting) (), Prehypertension, Recurrent genital herpes, Scoliosis (2008), Urinary tract infection (), Varicella (), and Visual impairment (2002).  She does not have any pertinent problems on file.  She  has a past surgical history that includes  section; [place] tooth extraction; Forearm fracture surgery; Umbilical hernia repair; Breast surgery (2016); Cosmetic surgery (2016); Spine surgery (2020); Breast Augmentation (2016); and Tubal Sterilization (N/A, 2023).  Her family history includes Arthritis in her father, mother, paternal uncle, and another family member; Breast cancer in her paternal grandmother; Cancer in her father; Diabetes in her mother; Drug abuse in her father; Hyperlipidemia in her father and mother; Hypertension in her father; Mental illness in her father; Migraines in her father; Obesity in her father and mother; Osteoporosis in her mother; Stroke in  her father.  She  reports that she has been smoking electronic cigarette. She has been exposed to tobacco smoke. She has never used smokeless tobacco. She reports current alcohol use of about 1.0 standard drink of alcohol per week. She reports that she does not use drugs.  Current Outpatient Medications   Medication Sig Dispense Refill    acetaminophen (Tylenol 8 Hour) 650 MG 8 hr tablet Take 1 tablet by mouth Every 8 (Eight) Hours As Needed for Mild Pain . 12 tablet 0    amphetamine-dextroamphetamine (ADDERALL) 10 MG tablet Take 1 tablet by mouth Daily. Takes daily when not taking Vyvanse.      cyclobenzaprine (FLEXERIL) 5 MG tablet Take 1 tablet by mouth At Night As Needed for Muscle Spasms. 30 tablet 0    dicyclomine (BENTYL) 20 MG tablet Take 1 tablet by mouth Every 6 (Six) Hours As Needed (abdominal cramps). 20 tablet 0    fluticasone (FLONASE) 50 MCG/ACT nasal spray       ibuprofen (ADVIL,MOTRIN) 600 MG tablet Take 1 tablet by mouth Every 6 (Six) Hours As Needed for Mild Pain or Moderate Pain. 30 tablet 1    lisdexamfetamine (VYVANSE) 50 MG capsule Take 1 capsule by mouth Daily Takes daily on the days she works.      valACYclovir (VALTREX) 500 MG tablet Take 1 tablet by mouth Daily. (Patient taking differently: Take 1 tablet by mouth As Needed.) 90 tablet 3    oxyCODONE-acetaminophen (PERCOCET) 5-325 MG per tablet Take 1 tablet by mouth Every 6 (Six) Hours As Needed for Severe Pain 6 tablet 0     No current facility-administered medications for this visit.     Current Outpatient Medications on File Prior to Visit   Medication Sig    acetaminophen (Tylenol 8 Hour) 650 MG 8 hr tablet Take 1 tablet by mouth Every 8 (Eight) Hours As Needed for Mild Pain .    amphetamine-dextroamphetamine (ADDERALL) 10 MG tablet Take 1 tablet by mouth Daily. Takes daily when not taking Vyvanse.    cyclobenzaprine (FLEXERIL) 5 MG tablet Take 1 tablet by mouth At Night As Needed for Muscle Spasms.    dicyclomine (BENTYL) 20 MG tablet  "Take 1 tablet by mouth Every 6 (Six) Hours As Needed (abdominal cramps).    fluticasone (FLONASE) 50 MCG/ACT nasal spray     ibuprofen (ADVIL,MOTRIN) 600 MG tablet Take 1 tablet by mouth Every 6 (Six) Hours As Needed for Mild Pain or Moderate Pain.    lisdexamfetamine (VYVANSE) 50 MG capsule Take 1 capsule by mouth Daily Takes daily on the days she works.    valACYclovir (VALTREX) 500 MG tablet Take 1 tablet by mouth Daily. (Patient taking differently: Take 1 tablet by mouth As Needed.)    oxyCODONE-acetaminophen (PERCOCET) 5-325 MG per tablet Take 1 tablet by mouth Every 6 (Six) Hours As Needed for Severe Pain     No current facility-administered medications on file prior to visit.     She is allergic to augmentin [amoxicillin-pot clavulanate] and bactrim [sulfamethoxazole-trimethoprim].    Social History    Tobacco Use      Smoking status: Every Day        Types: Electronic Cigarette        Passive exposure: Past      Smokeless tobacco: Never      Tobacco comments: Not sure when i quit exactly. I never felt particularly addicted so i just quit.    Review of Systems  Consitutional POS: nothing reported    NEG: anorexia or night sweats   Gastointestinal POS: nothing reported    NEG: bloating, change in bowel habits, melena, or reflux symptoms   Genitourinary POS: nothing reported    NEG: dysuria or hematuria   Integument POS: nothing reported    NEG: moles that are changing in size, shape, color or rashes   Breast POS: nothing reported    NEG: persistent breast lump, skin dimpling, or nipple discharge         Respiratory: negative  Cardiovascular: negative  GYN:  negative          Objective   /96   Ht 175.3 cm (69\")   Wt 81.6 kg (179 lb 12.8 oz)   LMP 11/16/2023 (Approximate) Comment: currently.  BMI 26.55 kg/m²     General:  well developed; well nourished  no acute distress   Skin:  Not performed.   Thyroid: not examined   Lungs:  breathing is unlabored   Heart:  Not performed.   Breasts:  Not performed. "   Abdomen: soft, non-tender; no masses  no umbilical or inguinal hernias are present  no hepato-splenomegaly  incision is clean, dry, intact, and without drainage   Pelvis: Not performed.     Psychiatric: Alert and oriented ×3, mood and affect appropriate  HEENT: Atraumatic, normocephalic, normal scleral icterus  Extremities: 2+ pulses bilaterally, no edema      Lab Review   UA and pathology benign-polyp    Imaging   No data reviewed        Assessment   8 days status post laparoscopic bilateral tubal ligation and hysteroscopy D&C with serene ablation     Plan   Overall patient doing well without complaints.  Continue pelvic rest for 4 weeks total.  Follow-up annually      No orders of the defined types were placed in this encounter.         This note was electronically signed.      November 28, 2023      Answers submitted by the patient for this visit:  Primary Reason for Visit (Submitted on 11/27/2023)  What is the primary reason for your visit?: Physical

## 2023-12-22 ENCOUNTER — HOSPITAL ENCOUNTER (INPATIENT)
Facility: HOSPITAL | Age: 43
LOS: 1 days | Discharge: HOME | DRG: 321 | End: 2023-12-24
Attending: EMERGENCY MEDICINE | Admitting: INTERNAL MEDICINE
Payer: COMMERCIAL

## 2023-12-22 ENCOUNTER — APPOINTMENT (OUTPATIENT)
Dept: GENERAL RADIOLOGY | Facility: HOSPITAL | Age: 43
DRG: 321 | End: 2023-12-22
Payer: COMMERCIAL

## 2023-12-22 ENCOUNTER — APPOINTMENT (OUTPATIENT)
Dept: CT IMAGING | Facility: HOSPITAL | Age: 43
DRG: 321 | End: 2023-12-22
Payer: COMMERCIAL

## 2023-12-22 DIAGNOSIS — I21.4 NSTEMI (NON-ST ELEVATED MYOCARDIAL INFARCTION): ICD-10-CM

## 2023-12-22 DIAGNOSIS — R79.89 ELEVATED TROPONIN: ICD-10-CM

## 2023-12-22 DIAGNOSIS — U07.1 COVID-19: Primary | ICD-10-CM

## 2023-12-22 PROBLEM — Z72.89 CURRENT EVERY DAY VAPING: Status: ACTIVE | Noted: 2023-12-22

## 2023-12-22 PROBLEM — I10 ESSENTIAL HYPERTENSION: Status: ACTIVE | Noted: 2023-12-22

## 2023-12-22 PROBLEM — R07.2 PRECORDIAL PAIN: Status: ACTIVE | Noted: 2023-12-22

## 2023-12-22 LAB
ALBUMIN SERPL-MCNC: 4.4 G/DL (ref 3.5–5.2)
ALBUMIN/GLOB SERPL: 1.8 G/DL
ALP SERPL-CCNC: 54 U/L (ref 39–117)
ALT SERPL W P-5'-P-CCNC: 26 U/L (ref 1–33)
AMPHET+METHAMPHET UR QL: POSITIVE
AMPHETAMINES UR QL: NEGATIVE
ANION GAP SERPL CALCULATED.3IONS-SCNC: 13.1 MMOL/L (ref 5–15)
AST SERPL-CCNC: 41 U/L (ref 1–32)
B-HCG UR QL: NEGATIVE
BARBITURATES UR QL SCN: NEGATIVE
BASOPHILS # BLD AUTO: 0.08 10*3/MM3 (ref 0–0.2)
BASOPHILS NFR BLD AUTO: 1 % (ref 0–1.5)
BENZODIAZ UR QL SCN: NEGATIVE
BILIRUB SERPL-MCNC: 0.3 MG/DL (ref 0–1.2)
BUN SERPL-MCNC: 8 MG/DL (ref 6–20)
BUN/CREAT SERPL: 10.3 (ref 7–25)
BUPRENORPHINE SERPL-MCNC: NEGATIVE NG/ML
CALCIUM SPEC-SCNC: 9.2 MG/DL (ref 8.6–10.5)
CANNABINOIDS SERPL QL: NEGATIVE
CHLORIDE SERPL-SCNC: 103 MMOL/L (ref 98–107)
CO2 SERPL-SCNC: 23.9 MMOL/L (ref 22–29)
COCAINE UR QL: NEGATIVE
CREAT SERPL-MCNC: 0.78 MG/DL (ref 0.57–1)
CRP SERPL-MCNC: <0.3 MG/DL (ref 0–0.5)
DEPRECATED RDW RBC AUTO: 39.9 FL (ref 37–54)
EGFRCR SERPLBLD CKD-EPI 2021: 97.4 ML/MIN/1.73
EOSINOPHIL # BLD AUTO: 0.1 10*3/MM3 (ref 0–0.4)
EOSINOPHIL NFR BLD AUTO: 1.3 % (ref 0.3–6.2)
ERYTHROCYTE [DISTWIDTH] IN BLOOD BY AUTOMATED COUNT: 12.3 % (ref 12.3–15.4)
FENTANYL UR-MCNC: POSITIVE NG/ML
FLUAV SUBTYP SPEC NAA+PROBE: NOT DETECTED
FLUBV RNA ISLT QL NAA+PROBE: NOT DETECTED
GEN 5 2HR TROPONIN T REFLEX: 236 NG/L
GLOBULIN UR ELPH-MCNC: 2.5 GM/DL
GLUCOSE SERPL-MCNC: 115 MG/DL (ref 65–99)
HCT VFR BLD AUTO: 45.3 % (ref 34–46.6)
HGB BLD-MCNC: 15.1 G/DL (ref 12–15.9)
IMM GRANULOCYTES # BLD AUTO: 0.02 10*3/MM3 (ref 0–0.05)
IMM GRANULOCYTES NFR BLD AUTO: 0.3 % (ref 0–0.5)
LYMPHOCYTES # BLD AUTO: 1.62 10*3/MM3 (ref 0.7–3.1)
LYMPHOCYTES NFR BLD AUTO: 21.1 % (ref 19.6–45.3)
MCH RBC QN AUTO: 29.2 PG (ref 26.6–33)
MCHC RBC AUTO-ENTMCNC: 33.3 G/DL (ref 31.5–35.7)
MCV RBC AUTO: 87.6 FL (ref 79–97)
METHADONE UR QL SCN: NEGATIVE
MONOCYTES # BLD AUTO: 0.46 10*3/MM3 (ref 0.1–0.9)
MONOCYTES NFR BLD AUTO: 6 % (ref 5–12)
NEUTROPHILS NFR BLD AUTO: 5.39 10*3/MM3 (ref 1.7–7)
NEUTROPHILS NFR BLD AUTO: 70.3 % (ref 42.7–76)
NRBC BLD AUTO-RTO: 0 /100 WBC (ref 0–0.2)
OPIATES UR QL: NEGATIVE
OXYCODONE UR QL SCN: NEGATIVE
PCP UR QL SCN: NEGATIVE
PLATELET # BLD AUTO: 256 10*3/MM3 (ref 140–450)
PMV BLD AUTO: 11.5 FL (ref 6–12)
POTASSIUM SERPL-SCNC: 3.3 MMOL/L (ref 3.5–5.2)
PROT SERPL-MCNC: 6.9 G/DL (ref 6–8.5)
RBC # BLD AUTO: 5.17 10*6/MM3 (ref 3.77–5.28)
SARS-COV-2 AG RESP QL IA.RAPID: NORMAL
SARS-COV-2 RNA RESP QL NAA+PROBE: DETECTED
SODIUM SERPL-SCNC: 140 MMOL/L (ref 136–145)
TRICYCLICS UR QL SCN: NEGATIVE
TROPONIN T DELTA: -10 NG/L
TROPONIN T SERPL HS-MCNC: 246 NG/L
WBC NRBC COR # BLD AUTO: 7.67 10*3/MM3 (ref 3.4–10.8)

## 2023-12-22 PROCEDURE — 87426 SARSCOV CORONAVIRUS AG IA: CPT | Performed by: INTERNAL MEDICINE

## 2023-12-22 PROCEDURE — G0378 HOSPITAL OBSERVATION PER HR: HCPCS

## 2023-12-22 PROCEDURE — 86140 C-REACTIVE PROTEIN: CPT | Performed by: EMERGENCY MEDICINE

## 2023-12-22 PROCEDURE — 25510000001 IOPAMIDOL 61 % SOLUTION: Performed by: EMERGENCY MEDICINE

## 2023-12-22 PROCEDURE — 85025 COMPLETE CBC W/AUTO DIFF WBC: CPT | Performed by: EMERGENCY MEDICINE

## 2023-12-22 PROCEDURE — 36415 COLL VENOUS BLD VENIPUNCTURE: CPT

## 2023-12-22 PROCEDURE — 87636 SARSCOV2 & INF A&B AMP PRB: CPT

## 2023-12-22 PROCEDURE — 93005 ELECTROCARDIOGRAM TRACING: CPT | Performed by: EMERGENCY MEDICINE

## 2023-12-22 PROCEDURE — 25010000002 ONDANSETRON PER 1 MG: Performed by: INTERNAL MEDICINE

## 2023-12-22 PROCEDURE — 25010000002 HYDRALAZINE PER 20 MG: Performed by: INTERNAL MEDICINE

## 2023-12-22 PROCEDURE — 71275 CT ANGIOGRAPHY CHEST: CPT

## 2023-12-22 PROCEDURE — 81025 URINE PREGNANCY TEST: CPT | Performed by: INTERNAL MEDICINE

## 2023-12-22 PROCEDURE — 71045 X-RAY EXAM CHEST 1 VIEW: CPT

## 2023-12-22 PROCEDURE — 99223 1ST HOSP IP/OBS HIGH 75: CPT | Performed by: INTERNAL MEDICINE

## 2023-12-22 PROCEDURE — 84484 ASSAY OF TROPONIN QUANT: CPT | Performed by: EMERGENCY MEDICINE

## 2023-12-22 PROCEDURE — 25010000002 FENTANYL CITRATE (PF) 50 MCG/ML SOLUTION: Performed by: EMERGENCY MEDICINE

## 2023-12-22 PROCEDURE — 80053 COMPREHEN METABOLIC PANEL: CPT | Performed by: EMERGENCY MEDICINE

## 2023-12-22 PROCEDURE — 80307 DRUG TEST PRSMV CHEM ANLYZR: CPT | Performed by: INTERNAL MEDICINE

## 2023-12-22 PROCEDURE — 99285 EMERGENCY DEPT VISIT HI MDM: CPT

## 2023-12-22 RX ORDER — ACETAMINOPHEN 160 MG/5ML
650 SOLUTION ORAL EVERY 4 HOURS PRN
Status: DISCONTINUED | OUTPATIENT
Start: 2023-12-22 | End: 2023-12-24 | Stop reason: HOSPADM

## 2023-12-22 RX ORDER — NICOTINE 21 MG/24HR
1 PATCH, TRANSDERMAL 24 HOURS TRANSDERMAL EVERY 24 HOURS
Status: DISCONTINUED | OUTPATIENT
Start: 2023-12-22 | End: 2023-12-24 | Stop reason: HOSPADM

## 2023-12-22 RX ORDER — HYDROCODONE BITARTRATE AND ACETAMINOPHEN 7.5; 325 MG/1; MG/1
1 TABLET ORAL EVERY 6 HOURS PRN
Status: DISCONTINUED | OUTPATIENT
Start: 2023-12-22 | End: 2023-12-24 | Stop reason: HOSPADM

## 2023-12-22 RX ORDER — ASPIRIN 81 MG/1
324 TABLET, CHEWABLE ORAL ONCE
Status: COMPLETED | OUTPATIENT
Start: 2023-12-22 | End: 2023-12-22

## 2023-12-22 RX ORDER — METOPROLOL SUCCINATE 25 MG/1
25 TABLET, EXTENDED RELEASE ORAL
Status: DISCONTINUED | OUTPATIENT
Start: 2023-12-23 | End: 2023-12-24 | Stop reason: HOSPADM

## 2023-12-22 RX ORDER — SODIUM CHLORIDE 0.9 % (FLUSH) 0.9 %
10 SYRINGE (ML) INJECTION EVERY 12 HOURS SCHEDULED
Status: DISCONTINUED | OUTPATIENT
Start: 2023-12-22 | End: 2023-12-24 | Stop reason: HOSPADM

## 2023-12-22 RX ORDER — BISACODYL 5 MG/1
5 TABLET, DELAYED RELEASE ORAL DAILY PRN
Status: DISCONTINUED | OUTPATIENT
Start: 2023-12-22 | End: 2023-12-24 | Stop reason: HOSPADM

## 2023-12-22 RX ORDER — ONDANSETRON 2 MG/ML
4 INJECTION INTRAMUSCULAR; INTRAVENOUS EVERY 6 HOURS PRN
Status: DISCONTINUED | OUTPATIENT
Start: 2023-12-22 | End: 2023-12-24 | Stop reason: HOSPADM

## 2023-12-22 RX ORDER — SODIUM CHLORIDE 9 MG/ML
40 INJECTION, SOLUTION INTRAVENOUS AS NEEDED
Status: DISCONTINUED | OUTPATIENT
Start: 2023-12-22 | End: 2023-12-24 | Stop reason: HOSPADM

## 2023-12-22 RX ORDER — ASPIRIN 81 MG/1
324 TABLET, CHEWABLE ORAL ONCE
Status: DISCONTINUED | OUTPATIENT
Start: 2023-12-22 | End: 2023-12-22

## 2023-12-22 RX ORDER — BISACODYL 10 MG
10 SUPPOSITORY, RECTAL RECTAL DAILY PRN
Status: DISCONTINUED | OUTPATIENT
Start: 2023-12-22 | End: 2023-12-24 | Stop reason: HOSPADM

## 2023-12-22 RX ORDER — AMOXICILLIN 250 MG
2 CAPSULE ORAL 2 TIMES DAILY
Status: DISCONTINUED | OUTPATIENT
Start: 2023-12-22 | End: 2023-12-24 | Stop reason: HOSPADM

## 2023-12-22 RX ORDER — ALUMINA, MAGNESIA, AND SIMETHICONE 2400; 2400; 240 MG/30ML; MG/30ML; MG/30ML
15 SUSPENSION ORAL EVERY 6 HOURS PRN
Status: DISCONTINUED | OUTPATIENT
Start: 2023-12-22 | End: 2023-12-24 | Stop reason: HOSPADM

## 2023-12-22 RX ORDER — SODIUM CHLORIDE 0.9 % (FLUSH) 0.9 %
10 SYRINGE (ML) INJECTION AS NEEDED
Status: DISCONTINUED | OUTPATIENT
Start: 2023-12-22 | End: 2023-12-24 | Stop reason: HOSPADM

## 2023-12-22 RX ORDER — ACETAMINOPHEN 325 MG/1
650 TABLET ORAL EVERY 4 HOURS PRN
Status: DISCONTINUED | OUTPATIENT
Start: 2023-12-22 | End: 2023-12-24 | Stop reason: HOSPADM

## 2023-12-22 RX ORDER — HYDRALAZINE HYDROCHLORIDE 20 MG/ML
10 INJECTION INTRAMUSCULAR; INTRAVENOUS EVERY 4 HOURS PRN
Status: DISCONTINUED | OUTPATIENT
Start: 2023-12-22 | End: 2023-12-24 | Stop reason: HOSPADM

## 2023-12-22 RX ORDER — ASPIRIN 81 MG/1
81 TABLET ORAL DAILY
Status: DISCONTINUED | OUTPATIENT
Start: 2023-12-23 | End: 2023-12-23 | Stop reason: SDUPTHER

## 2023-12-22 RX ORDER — POLYETHYLENE GLYCOL 3350 17 G/17G
17 POWDER, FOR SOLUTION ORAL DAILY PRN
Status: DISCONTINUED | OUTPATIENT
Start: 2023-12-22 | End: 2023-12-24 | Stop reason: HOSPADM

## 2023-12-22 RX ORDER — FENTANYL CITRATE 50 UG/ML
50 INJECTION, SOLUTION INTRAMUSCULAR; INTRAVENOUS ONCE
Status: COMPLETED | OUTPATIENT
Start: 2023-12-22 | End: 2023-12-22

## 2023-12-22 RX ORDER — ACETAMINOPHEN 650 MG/1
650 SUPPOSITORY RECTAL EVERY 4 HOURS PRN
Status: DISCONTINUED | OUTPATIENT
Start: 2023-12-22 | End: 2023-12-24 | Stop reason: HOSPADM

## 2023-12-22 RX ADMIN — HYDRALAZINE HYDROCHLORIDE 10 MG: 20 INJECTION, SOLUTION INTRAMUSCULAR; INTRAVENOUS at 22:55

## 2023-12-22 RX ADMIN — IOPAMIDOL 100 ML: 612 INJECTION, SOLUTION INTRAVENOUS at 19:25

## 2023-12-22 RX ADMIN — ONDANSETRON 4 MG: 2 INJECTION INTRAMUSCULAR; INTRAVENOUS at 23:46

## 2023-12-22 RX ADMIN — HYDROCODONE BITARTRATE AND ACETAMINOPHEN 1 TABLET: 7.5; 325 TABLET ORAL at 22:44

## 2023-12-22 RX ADMIN — Medication 10 ML: at 22:44

## 2023-12-22 RX ADMIN — NITROGLYCERIN 1 INCH: 20 OINTMENT TOPICAL at 21:42

## 2023-12-22 RX ADMIN — DOCUSATE SODIUM 50MG AND SENNOSIDES 8.6MG 2 TABLET: 8.6; 5 TABLET, FILM COATED ORAL at 22:44

## 2023-12-22 RX ADMIN — ALUMINUM HYDROXIDE, MAGNESIUM HYDROXIDE, AND DIMETHICONE 15 ML: 400; 400; 40 SUSPENSION ORAL at 22:43

## 2023-12-22 RX ADMIN — Medication 1 PATCH: at 22:43

## 2023-12-22 RX ADMIN — FENTANYL CITRATE 50 MCG: 50 INJECTION, SOLUTION INTRAMUSCULAR; INTRAVENOUS at 20:33

## 2023-12-22 RX ADMIN — ASPIRIN 81 MG CHEWABLE TABLET 324 MG: 81 TABLET CHEWABLE at 18:56

## 2023-12-22 NOTE — Clinical Note
Hemostasis started on the right radial artery. R-Band was used in achieving hemostasis. Radial compression device applied to vessel. Hemostasis achieved successfully. Closure device additional comment: 14Ml's of air in TR Band

## 2023-12-22 NOTE — ED PROVIDER NOTES
"Subjective  History of Present Illness:    Chief Complaint: Burning sensation of the chest    History of Present Illness: 43-year-old female history of tobacco abuse, feels like her lungs are \"on fire \"reported body aches sore throat headache earache, daughter tested positive for a few weeks prior, she had thought she had presumed COVID as she felt bad for several days afterwards.  Symptoms proximately 12 hours today.    Nurses Notes reviewed and agree, including vitals, allergies, social history and prior medical history.     REVIEW OF SYSTEMS: All systems reviewed and not pertinent unless noted.  Review of Systems      Positive for: Burning sensation in the chest, per triage note reported body aches sore throat headache and hearing    Negative for: Hemoptysis syncope edema    Past Medical History:   Diagnosis Date    ADHD (attention deficit hyperactivity disorder) 10/2002    Allergic 03/2002    Had prick test done several years ago. Didn't explain sympto    Anemia     Anxiety 10/2006    I have a stressful job    Depression 06/2018    Diverticulitis 2023    Diverticulitis of colon 04/25/2023    Diverticulosis 04/25/2023    Diagnosed at [facility].    Exposure to sexually transmitted disease (STD)     Headache     History of anemia     History of body piercing     History of ECG 08/26/2016    History of hypertension     History of kidney infection     History of migraine     Hyperlipidemia     Hypertension 2006    no medication required    Low back pain 09/2016    MRI showed bulging discs at L4 & L5. Treated by Chris Sipple    Migraine 10/05/2023    Obesity 06/2019    PMS (premenstrual syndrome)     PONV (postoperative nausea and vomiting) 2003    Prehypertension     Recurrent genital herpes     Scoliosis 03/2008    Muscular-induced scoliosis in upper back.    Urinary tract infection 2004    Varicella 1986    Visual impairment 07/2002    I wear glasses       Allergies:    Augmentin [amoxicillin-pot " clavulanate] and Bactrim [sulfamethoxazole-trimethoprim]      Past Surgical History:   Procedure Laterality Date    BREAST AUGMENTATION  2016    BREAST SURGERY  2016    Breast augmentation     SECTION      COSMETIC SURGERY  2016    Breast augmentation    FOREARM FRACTURE SURGERY      SPINE SURGERY  2020    Discectomy L5/S1    TUBAL COAGULATION LAPAROSCOPIC N/A 2023    Procedure: TUBAL FALLOPE FILSHIE CLIPPING LAPAROSCOPIC, hysteroscopy, dilatation curettage, removal of ParaGard IUD,cerene ablation;  Surgeon: Steven Avila MD;  Location: [facility];  Service: Obstetrics/Gynecology;  Laterality: N/A;    UMBILICAL HERNIA REPAIR      with mesh    WISDOM TOOTH EXTRACTION           Social History     Socioeconomic History    Marital status:    Tobacco Use    Smoking status: Every Day     Types: Electronic Cigarette     Passive exposure: Past    Smokeless tobacco: Never    Tobacco comments:     Not sure when i quit exactly. I never felt particularly addicted so i just quit.   Vaping Use    Vaping Use: Every day    Substances: Nicotine   Substance and Sexual Activity    Alcohol use: Yes     Alcohol/week: 1.0 standard drink of alcohol     Types: 1 Shots of liquor per week     Comment: Occasional alcohol use    Drug use: No    Sexual activity: Not Currently     Partners: Male     Birth control/protection: I.U.D.     Comment: I'm interested in getting my tubes tied         Family History   Problem Relation Age of Onset    Hyperlipidemia Mother     Obesity Mother     Osteoporosis Mother     Arthritis Mother         Arthritis in foot    Diabetes Mother         She is currently pre diabetic    Stroke Father     Arthritis Father     Hyperlipidemia Father     Hypertension Father     Mental illness Father     Migraines Father     Obesity Father     Cancer Father         Squamous cell carcinoma    Drug abuse Father         Lifetime marijuana use    Breast cancer Paternal Grandmother      "Arthritis Paternal Uncle         Rheumatoid arthritis    Arthritis Other        Objective  Physical Exam:  BP (!) 186/126 (BP Location: Left arm, Patient Position: Sitting)   Pulse 82   Temp 97.9 °F (36.6 °C) (Oral)   Resp 16   Ht 175.3 cm (69\")   Wt 80.5 kg (177 lb 6.4 oz)   SpO2 100%   BMI 26.20 kg/m²      Physical Exam    CONSTITUTIONAL: Well developed, nontoxic healthy-appearing 43-year-old female,  in no acute distress.  VITAL SIGNS: per nursing, reviewed and noted  SKIN: exposed skin with no rashes, ulcerations or petechiae  EYES: Grossly EOMI, no icterus  ENT: Normal voice.  Moist mucous membranes   RESPIRATORY:  No increased work of breathing. No retractions.  Chest clear  CARDIOVASCULAR:   Extremities pink and warm.  Good cap refill to extremities.  Regular rate and rhythm no murmur  GI: Abdomen without distention   MUSCULOSKELETAL: Age-appropriate bulk and tone, moves all 4 extremities negative Homans' sign.  No calf asymmetry  NEUROLOGIC: Alert, oriented x 3. No gross deficits. GCS 15.   PSYCH: appropriate affect.  : no bladder tenderness or distention, no CVA tenderness    Procedures    ED Course:    Lab Results (last 24 hours)       Procedure Component Value Units Date/Time    COVID-19 and FLU A/B PCR, 1 HR TAT - Swab, Nasopharynx [[id number]]  (Abnormal) Collected: 12/22/23 1708    Specimen: Swab from Nasopharynx Updated: 12/22/23 1737     COVID19 Detected     Influenza A PCR Not Detected     Influenza B PCR Not Detected    Narrative:      Fact sheet for providers: https://www.fda.gov/media/955470/download    Fact sheet for patients: https://www.fda.gov/media/435003/download    Test performed by PCR.  Influenza A and Influenza B negative results should be considered presumptive in samples that have a positive SARS-CoV-2 result.    Competitive inhibition studies showed that SARS-CoV-2 virus, when present at concentrations above 3.6E+04 copies/mL, can inhibit the detection and amplification of " influenza A and influenza B virus RNA if present at or below 1.8E+02 copies/mL or 4.9E+02 copies/mL, respectively, and may lead to false negative influenza virus results. If co-infection with influenza A or influenza B virus is suspected in samples with a positive SARS-CoV-2 result, the sample should be re-tested with another FDA cleared, approved, or authorized influenza test, if influenza virus detection would change clinical management.    Single High Sensitivity Troponin T [[id number]]  (Abnormal) Collected: 12/22/23 1732    Specimen: Blood Updated: 12/22/23 1808     HS Troponin T 246 ng/L     Narrative:      High Sensitive Troponin T Reference Range:  <14.0 ng/L- Negative Female for AMI  <22.0 ng/L- Negative Male for AMI  >=14 - Abnormal Female indicating possible myocardial injury.  >=22 - Abnormal Male indicating possible myocardial injury.   Clinicians would have to utilize clinical acumen, EKG, Troponin, and serial changes to determine if it is an Acute Myocardial Infarction or myocardial injury due to an underlying chronic condition.         Comprehensive Metabolic Panel [[id number]]  (Abnormal) Collected: 12/22/23 1732    Specimen: Blood Updated: 12/22/23 1854     Glucose 115 mg/dL      BUN 8 mg/dL      Creatinine 0.78 mg/dL      Sodium 140 mmol/L      Potassium 3.3 mmol/L      Chloride 103 mmol/L      CO2 23.9 mmol/L      Calcium 9.2 mg/dL      Total Protein 6.9 g/dL      Albumin 4.4 g/dL      ALT (SGPT) 26 U/L      AST (SGOT) 41 U/L      Alkaline Phosphatase 54 U/L      Total Bilirubin 0.3 mg/dL      Globulin 2.5 gm/dL      A/G Ratio 1.8 g/dL      BUN/Creatinine Ratio 10.3     Anion Gap 13.1 mmol/L      eGFR 97.4 mL/min/1.73     Narrative:      GFR Normal >60  Chronic Kidney Disease <60  Kidney Failure <15      C-reactive Protein [[id number]]  (Normal) Collected: 12/22/23 1732    Specimen: Blood Updated: 12/22/23 1911     C-Reactive Protein <0.30 mg/dL     High Sensitivity Troponin T 2Hr [[id number]]   (Abnormal) Collected: 12/22/23 1852    Specimen: Blood Updated: 12/22/23 1930     HS Troponin T 236 ng/L      Troponin T Delta -10 ng/L     Narrative:      High Sensitive Troponin T Reference Range:  <14.0 ng/L- Negative Female for AMI  <22.0 ng/L- Negative Male for AMI  >=14 - Abnormal Female indicating possible myocardial injury.  >=22 - Abnormal Male indicating possible myocardial injury.   Clinicians would have to utilize clinical acumen, EKG, Troponin, and serial changes to determine if it is an Acute Myocardial Infarction or myocardial injury due to an underlying chronic condition.         CBC & Differential [[id number]]  (Normal) Collected: 12/22/23 1852    Specimen: Blood Updated: 12/22/23 1902    Narrative:      The following orders were created for panel order CBC & Differential.  Procedure                               Abnormality         Status                     ---------                               -----------         ------                     CBC Auto Differential[[id number]]        Normal              Final result                 Please view results for these tests on the individual orders.    CBC Auto Differential [[id number]]  (Normal) Collected: 12/22/23 1852    Specimen: Blood Updated: 12/22/23 1902     WBC 7.67 10*3/mm3      RBC 5.17 10*6/mm3      Hemoglobin 15.1 g/dL      Hematocrit 45.3 %      MCV 87.6 fL      MCH 29.2 pg      MCHC 33.3 g/dL      RDW 12.3 %      RDW-SD 39.9 fl      MPV 11.5 fL      Platelets 256 10*3/mm3      Neutrophil % 70.3 %      Lymphocyte % 21.1 %      Monocyte % 6.0 %      Eosinophil % 1.3 %      Basophil % 1.0 %      Immature Grans % 0.3 %      Neutrophils, Absolute 5.39 10*3/mm3      Lymphocytes, Absolute 1.62 10*3/mm3      Monocytes, Absolute 0.46 10*3/mm3      Eosinophils, Absolute 0.10 10*3/mm3      Basophils, Absolute 0.08 10*3/mm3      Immature Grans, Absolute 0.02 10*3/mm3      nRBC 0.0 /100 WBC              CT Angiogram Chest Pulmonary Embolism    Result Date:  "12/22/2023  FINAL REPORT TECHNIQUE: null CLINICAL HISTORY: chest pain, elevated troponin. covid positive COMPARISON: null FINDINGS: CT angiogram of the chest. Multiplanar MIPS were obtained. No comparison. Findings: No PE is identified. No dissection of the thoracic aorta is seen. Small right thyroid nodule. No pleural or pericardial effusion. Bilateral breast implants. No consolidation. The lungs are well aerated.     Impression: Impression: No PE is identified. Small right thyroid nodule consider correlation with ultrasound. Authenticated and Electronically Signed by Javier Ramos MD on 12/22/2023 08:09:46 PM      [facility]     Amount and/or Complexity of Data Reviewed  Clinical lab tests: reviewed  Tests in the radiology section of CPT®: reviewed  Tests in the medicine section of CPT®: reviewed  Independent visualization of images, tracings, or specimens: yes        ED Course as of 12/22/23 2029   Fri Dec 22, 2023   1735 EKG interpreted by me reveals sinus rhythm rate of 80 no ectopy no ischemic changes [PF]   1756 Chest x-ray interpreted by me shows no evidence of any cardiomegaly, effusion, infiltrate, or bony abnormality. [PF]      ED Course User Index  [PF] Buddy Persaud, DO       Medical Decision Making:    Initial impression of presenting illness: 43-year-old female history of tobacco abuse, feels like her lungs are \"on fire \"reported body aches sore throat headache earache, daughter tested positive for a few weeks prior, she had thought she had presumed COVID as she felt bad for several days afterwards.  Symptoms proximately 12 hours today.    DDX: includes but is not limited to: Myocarditis, pericarditis, pneumonia, viral syndrome, less likely ACS         Patient arrives hypertensive afebrile no tachycardia sats 100% room air with vitals interpreted by myself.     Pertinent features from physical exam: Benign exam.    Initial diagnostic plan: Troponin EKG chest x-ray initially flu and COVID.  Added CBC CMP CT " chest PE protocol after results returned    Results from initial plan were reviewed and interpreted by me revealing COVID-positive, troponin 246, CBC CMP CRP CT chest PE protocol serial troponin pending    Diagnostic information from other sources: Old record review    Interventions / Re-evaluation: Aspirin    Results/clinical rationale were discussed with patient and family member at the bedside    Consultations/Discussion of results with other physicians: Dr. Daly, cardiology, recommended admission  Discussed with Dr. Melendez hospitalist will plan for admission however requesting for workup.  Patient be signed out to oncoming physician for final disposition    CT scan reveals no acute intrathoracic abnormality, thyroid nodule noted.  Patient updated.    Disposition plan: Admission  -----      Final diagnoses:   COVID-19   Elevated troponin            Beau Zhao MD  12/22/23 2030

## 2023-12-22 NOTE — Clinical Note
A 6 fr sheath was  inserted using micropuncture technique with ultrasound guidance Sheath insertion not delayed.

## 2023-12-23 PROBLEM — I21.4 NSTEMI, INITIAL EPISODE OF CARE: Status: ACTIVE | Noted: 2023-12-23

## 2023-12-23 PROBLEM — U07.1 COVID-19: Status: ACTIVE | Noted: 2023-12-22

## 2023-12-23 LAB
ACT BLD: 271 SECONDS (ref 82–152)
ANION GAP SERPL CALCULATED.3IONS-SCNC: 8.4 MMOL/L (ref 5–15)
BUN SERPL-MCNC: 9 MG/DL (ref 6–20)
BUN/CREAT SERPL: 13.8 (ref 7–25)
CALCIUM SPEC-SCNC: 9.1 MG/DL (ref 8.6–10.5)
CHLORIDE SERPL-SCNC: 106 MMOL/L (ref 98–107)
CHOLEST SERPL-MCNC: 191 MG/DL (ref 0–200)
CO2 SERPL-SCNC: 24.6 MMOL/L (ref 22–29)
CREAT SERPL-MCNC: 0.65 MG/DL (ref 0.57–1)
DEPRECATED RDW RBC AUTO: 39.6 FL (ref 37–54)
EGFRCR SERPLBLD CKD-EPI 2021: 112.9 ML/MIN/1.73
ERYTHROCYTE [DISTWIDTH] IN BLOOD BY AUTOMATED COUNT: 12.5 % (ref 12.3–15.4)
GLUCOSE SERPL-MCNC: 122 MG/DL (ref 65–99)
HCT VFR BLD AUTO: 40.3 % (ref 34–46.6)
HDLC SERPL-MCNC: 63 MG/DL (ref 40–60)
HGB BLD-MCNC: 13.4 G/DL (ref 12–15.9)
LDLC SERPL CALC-MCNC: 108 MG/DL (ref 0–100)
LDLC/HDLC SERPL: 1.68 {RATIO}
MAGNESIUM SERPL-MCNC: 2.1 MG/DL (ref 1.6–2.6)
MCH RBC QN AUTO: 28.9 PG (ref 26.6–33)
MCHC RBC AUTO-ENTMCNC: 33.3 G/DL (ref 31.5–35.7)
MCV RBC AUTO: 86.9 FL (ref 79–97)
PLATELET # BLD AUTO: 224 10*3/MM3 (ref 140–450)
PMV BLD AUTO: 11.4 FL (ref 6–12)
POTASSIUM SERPL-SCNC: 3 MMOL/L (ref 3.5–5.2)
POTASSIUM SERPL-SCNC: 4 MMOL/L (ref 3.5–5.2)
RBC # BLD AUTO: 4.64 10*6/MM3 (ref 3.77–5.28)
SODIUM SERPL-SCNC: 139 MMOL/L (ref 136–145)
TRIGL SERPL-MCNC: 111 MG/DL (ref 0–150)
TROPONIN T SERPL HS-MCNC: 757 NG/L
TSH SERPL DL<=0.05 MIU/L-ACNC: 1.61 UIU/ML (ref 0.27–4.2)
VLDLC SERPL-MCNC: 20 MG/DL (ref 5–40)
WBC NRBC COR # BLD AUTO: 8.38 10*3/MM3 (ref 3.4–10.8)

## 2023-12-23 PROCEDURE — 92978 ENDOLUMINL IVUS OCT C 1ST: CPT | Performed by: INTERNAL MEDICINE

## 2023-12-23 PROCEDURE — 84443 ASSAY THYROID STIM HORMONE: CPT | Performed by: INTERNAL MEDICINE

## 2023-12-23 PROCEDURE — 85347 COAGULATION TIME ACTIVATED: CPT

## 2023-12-23 PROCEDURE — 99204 OFFICE O/P NEW MOD 45 MIN: CPT | Performed by: INTERNAL MEDICINE

## 2023-12-23 PROCEDURE — 80061 LIPID PANEL: CPT | Performed by: INTERNAL MEDICINE

## 2023-12-23 PROCEDURE — 92928 PRQ TCAT PLMT NTRAC ST 1 LES: CPT | Performed by: INTERNAL MEDICINE

## 2023-12-23 PROCEDURE — 25010000002 MIDAZOLAM PER 1MG: Performed by: INTERNAL MEDICINE

## 2023-12-23 PROCEDURE — 25510000001 IOPAMIDOL PER 1 ML: Performed by: INTERNAL MEDICINE

## 2023-12-23 PROCEDURE — G0378 HOSPITAL OBSERVATION PER HR: HCPCS

## 2023-12-23 PROCEDURE — C1769 GUIDE WIRE: HCPCS | Performed by: INTERNAL MEDICINE

## 2023-12-23 PROCEDURE — 85027 COMPLETE CBC AUTOMATED: CPT | Performed by: INTERNAL MEDICINE

## 2023-12-23 PROCEDURE — C9600 PERC DRUG-EL COR STENT SING: HCPCS | Performed by: INTERNAL MEDICINE

## 2023-12-23 PROCEDURE — 25010000002 HYDRALAZINE PER 20 MG: Performed by: INTERNAL MEDICINE

## 2023-12-23 PROCEDURE — 4A023N7 MEASUREMENT OF CARDIAC SAMPLING AND PRESSURE, LEFT HEART, PERCUTANEOUS APPROACH: ICD-10-PCS | Performed by: INTERNAL MEDICINE

## 2023-12-23 PROCEDURE — 25010000002 MORPHINE PER 10 MG: Performed by: INTERNAL MEDICINE

## 2023-12-23 PROCEDURE — B221Z2Z COMPUTERIZED TOMOGRAPHY (CT SCAN) OF MULTIPLE CORONARY ARTERIES USING INTRAVASCULAR OPTICAL COHERENCE: ICD-10-PCS | Performed by: INTERNAL MEDICINE

## 2023-12-23 PROCEDURE — 25010000002 FENTANYL CITRATE (PF) 50 MCG/ML SOLUTION: Performed by: INTERNAL MEDICINE

## 2023-12-23 PROCEDURE — C1887 CATHETER, GUIDING: HCPCS | Performed by: INTERNAL MEDICINE

## 2023-12-23 PROCEDURE — 99153 MOD SED SAME PHYS/QHP EA: CPT | Performed by: INTERNAL MEDICINE

## 2023-12-23 PROCEDURE — 84132 ASSAY OF SERUM POTASSIUM: CPT | Performed by: INTERNAL MEDICINE

## 2023-12-23 PROCEDURE — 99152 MOD SED SAME PHYS/QHP 5/>YRS: CPT | Performed by: INTERNAL MEDICINE

## 2023-12-23 PROCEDURE — C1894 INTRO/SHEATH, NON-LASER: HCPCS | Performed by: INTERNAL MEDICINE

## 2023-12-23 PROCEDURE — 93454 CORONARY ARTERY ANGIO S&I: CPT | Performed by: INTERNAL MEDICINE

## 2023-12-23 PROCEDURE — B2111ZZ FLUOROSCOPY OF MULTIPLE CORONARY ARTERIES USING LOW OSMOLAR CONTRAST: ICD-10-PCS | Performed by: INTERNAL MEDICINE

## 2023-12-23 PROCEDURE — 99232 SBSQ HOSP IP/OBS MODERATE 35: CPT | Performed by: FAMILY MEDICINE

## 2023-12-23 PROCEDURE — C1874 STENT, COATED/COV W/DEL SYS: HCPCS | Performed by: INTERNAL MEDICINE

## 2023-12-23 PROCEDURE — 027037Z DILATION OF CORONARY ARTERY, ONE ARTERY WITH FOUR OR MORE DRUG-ELUTING INTRALUMINAL DEVICES, PERCUTANEOUS APPROACH: ICD-10-PCS | Performed by: INTERNAL MEDICINE

## 2023-12-23 PROCEDURE — 25010000002 PROCHLORPERAZINE 10 MG/2ML SOLUTION: Performed by: INTERNAL MEDICINE

## 2023-12-23 PROCEDURE — 25010000002 LIDOCAINE 1 % SOLUTION: Performed by: INTERNAL MEDICINE

## 2023-12-23 PROCEDURE — 84484 ASSAY OF TROPONIN QUANT: CPT | Performed by: INTERNAL MEDICINE

## 2023-12-23 PROCEDURE — 25810000003 SODIUM CHLORIDE 0.9 % SOLUTION: Performed by: INTERNAL MEDICINE

## 2023-12-23 PROCEDURE — 25010000002 HEPARIN (PORCINE) PER 1000 UNITS: Performed by: INTERNAL MEDICINE

## 2023-12-23 PROCEDURE — C1753 CATH, INTRAVAS ULTRASOUND: HCPCS | Performed by: INTERNAL MEDICINE

## 2023-12-23 PROCEDURE — 83735 ASSAY OF MAGNESIUM: CPT | Performed by: FAMILY MEDICINE

## 2023-12-23 PROCEDURE — 80048 BASIC METABOLIC PNL TOTAL CA: CPT | Performed by: INTERNAL MEDICINE

## 2023-12-23 DEVICE — XIENCE SKYPOINT™ EVEROLIMUS ELUTING CORONARY STENT SYSTEM 3.50 MM X 08 MM / RAPID-EXCHANGE
Type: IMPLANTABLE DEVICE | Site: CORONARY | Status: FUNCTIONAL
Brand: XIENCE SKYPOINT™

## 2023-12-23 DEVICE — XIENCE SKYPOINT™ EVEROLIMUS ELUTING CORONARY STENT SYSTEM 3.50 MM X 18 MM / RAPID-EXCHANGE
Type: IMPLANTABLE DEVICE | Site: CORONARY | Status: FUNCTIONAL
Brand: XIENCE SKYPOINT™

## 2023-12-23 DEVICE — XIENCE SKYPOINT™ EVEROLIMUS ELUTING CORONARY STENT SYSTEM 3.50 MM X 12 MM / RAPID-EXCHANGE
Type: IMPLANTABLE DEVICE | Site: CORONARY | Status: FUNCTIONAL
Brand: XIENCE SKYPOINT™

## 2023-12-23 DEVICE — XIENCE SKYPOINT™ EVEROLIMUS ELUTING CORONARY STENT SYSTEM 2.75 MM X 38 MM / RAPID-EXCHANGE
Type: IMPLANTABLE DEVICE | Site: CORONARY | Status: FUNCTIONAL
Brand: XIENCE SKYPOINT™

## 2023-12-23 RX ORDER — ATORVASTATIN CALCIUM 80 MG/1
80 TABLET, FILM COATED ORAL NIGHTLY
Status: DISCONTINUED | OUTPATIENT
Start: 2023-12-23 | End: 2023-12-24 | Stop reason: HOSPADM

## 2023-12-23 RX ORDER — HEPARIN SODIUM 1000 [USP'U]/ML
INJECTION, SOLUTION INTRAVENOUS; SUBCUTANEOUS
Status: DISCONTINUED | OUTPATIENT
Start: 2023-12-23 | End: 2023-12-23 | Stop reason: HOSPADM

## 2023-12-23 RX ORDER — SODIUM CHLORIDE 9 MG/ML
100 INJECTION, SOLUTION INTRAVENOUS CONTINUOUS
Status: ACTIVE | OUTPATIENT
Start: 2023-12-23 | End: 2023-12-23

## 2023-12-23 RX ORDER — LIDOCAINE HYDROCHLORIDE 10 MG/ML
INJECTION, SOLUTION INFILTRATION; PERINEURAL
Status: DISCONTINUED | OUTPATIENT
Start: 2023-12-23 | End: 2023-12-23 | Stop reason: HOSPADM

## 2023-12-23 RX ORDER — ONDANSETRON 4 MG/1
4 TABLET, ORALLY DISINTEGRATING ORAL EVERY 6 HOURS PRN
Status: DISCONTINUED | OUTPATIENT
Start: 2023-12-23 | End: 2023-12-24 | Stop reason: HOSPADM

## 2023-12-23 RX ORDER — ACETAMINOPHEN 325 MG/1
650 TABLET ORAL EVERY 4 HOURS PRN
Status: DISCONTINUED | OUTPATIENT
Start: 2023-12-23 | End: 2023-12-24 | Stop reason: HOSPADM

## 2023-12-23 RX ORDER — ASPIRIN 325 MG
TABLET ORAL
Status: DISCONTINUED | OUTPATIENT
Start: 2023-12-23 | End: 2023-12-23 | Stop reason: HOSPADM

## 2023-12-23 RX ORDER — NALOXONE HCL 0.4 MG/ML
0.4 VIAL (ML) INJECTION
Status: DISCONTINUED | OUTPATIENT
Start: 2023-12-23 | End: 2023-12-24 | Stop reason: HOSPADM

## 2023-12-23 RX ORDER — MIDAZOLAM HYDROCHLORIDE 2 MG/2ML
INJECTION, SOLUTION INTRAMUSCULAR; INTRAVENOUS
Status: DISCONTINUED | OUTPATIENT
Start: 2023-12-23 | End: 2023-12-23 | Stop reason: HOSPADM

## 2023-12-23 RX ORDER — ALPRAZOLAM 0.25 MG/1
0.25 TABLET ORAL 3 TIMES DAILY PRN
Status: DISCONTINUED | OUTPATIENT
Start: 2023-12-23 | End: 2023-12-24 | Stop reason: HOSPADM

## 2023-12-23 RX ORDER — VERAPAMIL HYDROCHLORIDE 2.5 MG/ML
INJECTION, SOLUTION INTRAVENOUS
Status: DISCONTINUED | OUTPATIENT
Start: 2023-12-23 | End: 2023-12-23 | Stop reason: HOSPADM

## 2023-12-23 RX ORDER — TEMAZEPAM 15 MG/1
15 CAPSULE ORAL NIGHTLY PRN
Status: DISCONTINUED | OUTPATIENT
Start: 2023-12-23 | End: 2023-12-24 | Stop reason: HOSPADM

## 2023-12-23 RX ORDER — NITROGLYCERIN 0.4 MG/1
0.4 TABLET SUBLINGUAL
Status: DISCONTINUED | OUTPATIENT
Start: 2023-12-23 | End: 2023-12-24 | Stop reason: HOSPADM

## 2023-12-23 RX ORDER — CLOPIDOGREL BISULFATE 75 MG/1
75 TABLET ORAL DAILY
Status: DISCONTINUED | OUTPATIENT
Start: 2023-12-24 | End: 2023-12-24 | Stop reason: HOSPADM

## 2023-12-23 RX ORDER — ASPIRIN 325 MG
325 TABLET, DELAYED RELEASE (ENTERIC COATED) ORAL DAILY
Status: DISCONTINUED | OUTPATIENT
Start: 2023-12-24 | End: 2023-12-24 | Stop reason: HOSPADM

## 2023-12-23 RX ORDER — DIPHENHYDRAMINE HYDROCHLORIDE 50 MG/ML
25 INJECTION INTRAMUSCULAR; INTRAVENOUS EVERY 6 HOURS PRN
Status: DISCONTINUED | OUTPATIENT
Start: 2023-12-23 | End: 2023-12-24 | Stop reason: HOSPADM

## 2023-12-23 RX ORDER — HYDROCODONE BITARTRATE AND ACETAMINOPHEN 5; 325 MG/1; MG/1
1 TABLET ORAL EVERY 4 HOURS PRN
Status: DISCONTINUED | OUTPATIENT
Start: 2023-12-23 | End: 2023-12-24 | Stop reason: HOSPADM

## 2023-12-23 RX ORDER — POTASSIUM CHLORIDE 20 MEQ/1
40 TABLET, EXTENDED RELEASE ORAL EVERY 4 HOURS
Status: DISPENSED | OUTPATIENT
Start: 2023-12-23 | End: 2023-12-23

## 2023-12-23 RX ORDER — CLOPIDOGREL BISULFATE 75 MG/1
TABLET ORAL
Status: DISCONTINUED | OUTPATIENT
Start: 2023-12-23 | End: 2023-12-23 | Stop reason: HOSPADM

## 2023-12-23 RX ORDER — FENTANYL CITRATE 50 UG/ML
INJECTION, SOLUTION INTRAMUSCULAR; INTRAVENOUS
Status: DISCONTINUED | OUTPATIENT
Start: 2023-12-23 | End: 2023-12-23 | Stop reason: HOSPADM

## 2023-12-23 RX ORDER — PROCHLORPERAZINE EDISYLATE 5 MG/ML
10 INJECTION INTRAMUSCULAR; INTRAVENOUS EVERY 6 HOURS PRN
Status: DISCONTINUED | OUTPATIENT
Start: 2023-12-23 | End: 2023-12-24 | Stop reason: HOSPADM

## 2023-12-23 RX ORDER — LABETALOL HYDROCHLORIDE 5 MG/ML
INJECTION, SOLUTION INTRAVENOUS
Status: DISCONTINUED | OUTPATIENT
Start: 2023-12-23 | End: 2023-12-23 | Stop reason: HOSPADM

## 2023-12-23 RX ORDER — MORPHINE SULFATE 2 MG/ML
2 INJECTION, SOLUTION INTRAMUSCULAR; INTRAVENOUS EVERY 4 HOURS PRN
Status: DISCONTINUED | OUTPATIENT
Start: 2023-12-23 | End: 2023-12-24 | Stop reason: HOSPADM

## 2023-12-23 RX ORDER — ONDANSETRON 2 MG/ML
4 INJECTION INTRAMUSCULAR; INTRAVENOUS EVERY 6 HOURS PRN
Status: DISCONTINUED | OUTPATIENT
Start: 2023-12-23 | End: 2023-12-24 | Stop reason: HOSPADM

## 2023-12-23 RX ADMIN — MORPHINE SULFATE 2 MG: 2 INJECTION, SOLUTION INTRAMUSCULAR; INTRAVENOUS at 04:14

## 2023-12-23 RX ADMIN — METOPROLOL SUCCINATE 25 MG: 25 TABLET, EXTENDED RELEASE ORAL at 10:10

## 2023-12-23 RX ADMIN — HYDRALAZINE HYDROCHLORIDE 10 MG: 20 INJECTION, SOLUTION INTRAMUSCULAR; INTRAVENOUS at 11:38

## 2023-12-23 RX ADMIN — HYDROCODONE BITARTRATE AND ACETAMINOPHEN 1 TABLET: 7.5; 325 TABLET ORAL at 11:27

## 2023-12-23 RX ADMIN — Medication 10 ML: at 21:14

## 2023-12-23 RX ADMIN — ASPIRIN 81 MG: 81 TABLET, COATED ORAL at 10:10

## 2023-12-23 RX ADMIN — ATORVASTATIN CALCIUM 80 MG: 80 TABLET, FILM COATED ORAL at 21:14

## 2023-12-23 RX ADMIN — PROCHLORPERAZINE EDISYLATE 10 MG: 5 INJECTION INTRAMUSCULAR; INTRAVENOUS at 14:57

## 2023-12-23 RX ADMIN — POTASSIUM CHLORIDE 40 MEQ: 1500 TABLET, EXTENDED RELEASE ORAL at 10:10

## 2023-12-23 RX ADMIN — SODIUM CHLORIDE 100 ML/HR: 9 INJECTION, SOLUTION INTRAVENOUS at 14:51

## 2023-12-23 RX ADMIN — ACETAMINOPHEN 650 MG: 325 TABLET, FILM COATED ORAL at 03:05

## 2023-12-23 RX ADMIN — ACETAMINOPHEN 650 MG: 325 TABLET, FILM COATED ORAL at 14:57

## 2023-12-23 RX ADMIN — DOCUSATE SODIUM 50MG AND SENNOSIDES 8.6MG 2 TABLET: 8.6; 5 TABLET, FILM COATED ORAL at 21:14

## 2023-12-23 RX ADMIN — ACETAMINOPHEN 650 MG: 325 TABLET, FILM COATED ORAL at 21:13

## 2023-12-23 RX ADMIN — PROCHLORPERAZINE EDISYLATE 10 MG: 5 INJECTION INTRAMUSCULAR; INTRAVENOUS at 04:14

## 2023-12-23 NOTE — PLAN OF CARE
Goal Outcome Evaluation:           Progress: improving            Problem: Adult Inpatient Plan of Care  Goal: Plan of Care Review  12/23/2023 1859 by Annabel Olson RN  Outcome: Ongoing, Progressing  12/23/2023 1858 by Annabel Olson RN  Outcome: Ongoing, Progressing  Flowsheets  Taken 12/23/2023 1858 by Annabel Olson RN  Progress: improving  Taken 12/23/2023 0640 by Miley Cruz RN  Plan of Care Reviewed With: patient  Goal: Patient-Specific Goal (Individualized)  12/23/2023 1859 by Annabel Olson RN  Outcome: Ongoing, Progressing  12/23/2023 1858 by Annabel Olson RN  Outcome: Ongoing, Progressing  Goal: Absence of Hospital-Acquired Illness or Injury  12/23/2023 1859 by Annabel Olson RN  Outcome: Ongoing, Progressing  12/23/2023 1858 by Annabel Olson RN  Outcome: Ongoing, Progressing  Intervention: Identify and Manage Fall Risk  Description: Perform standard risk assessment on admission using a validated tool or comprehensive approach appropriate to the patient; reassess fall risk frequently, with change in status or transfer to another level of care.  Communicate fall injury risk to interprofessional healthcare team.  Determine need for increased observation, equipment and environmental modification, such as low bed, signage and supportive, nonskid footwear.  Adjust safety measures to individual developmental age, stage and identified risk factors.  Reinforce the importance of safety and physical activity with patient and family.  Perform regular intentional rounding to assess need for position change, pain assessment and personal needs, including assistance with toileting.  Recent Flowsheet Documentation  Taken 12/23/2023 1800 by Annabel Olson RN  Safety Promotion/Fall Prevention:   safety round/check completed   room organization consistent   nonskid shoes/slippers when out of bed   fall prevention program maintained   clutter free environment maintained    assistive device/personal items within reach   activity supervised  Taken 12/23/2023 1600 by Annabel Olson RN  Safety Promotion/Fall Prevention:   safety round/check completed   room organization consistent   nonskid shoes/slippers when out of bed   fall prevention program maintained   clutter free environment maintained   assistive device/personal items within reach   activity supervised  Taken 12/23/2023 1400 by Annabel Olson RN  Safety Promotion/Fall Prevention:   safety round/check completed   room organization consistent   nonskid shoes/slippers when out of bed   fall prevention program maintained   clutter free environment maintained   assistive device/personal items within reach   activity supervised  Taken 12/23/2023 1200 by Annabel Olson RN  Safety Promotion/Fall Prevention:   safety round/check completed   room organization consistent   nonskid shoes/slippers when out of bed   fall prevention program maintained   clutter free environment maintained   assistive device/personal items within reach   activity supervised  Taken 12/23/2023 1000 by Annabel Olson RN  Safety Promotion/Fall Prevention:   safety round/check completed   room organization consistent   nonskid shoes/slippers when out of bed   fall prevention program maintained   clutter free environment maintained   assistive device/personal items within reach   activity supervised  Taken 12/23/2023 0800 by Annabel Olson, RN  Safety Promotion/Fall Prevention:   safety round/check completed   room organization consistent   nonskid shoes/slippers when out of bed   fall prevention program maintained   clutter free environment maintained   assistive device/personal items within reach   activity supervised  Intervention: Prevent Skin Injury  Description: Perform a screening for skin injury risk, such as pressure or moisture associated skin damage on admission and at regular intervals throughout hospital stay.  Keep all areas  of skin (especially folds) clean and dry.  Maintain adequate skin hydration.  Relieve and redistribute pressure and protect bony prominences; implement measures based on patient-specific risk factors.  Match turning and repositioning schedule to clinical condition.  Encourage weight shift frequently; assist with reposition if unable to complete independently.  Float heels off bed; avoid pressure on the Achilles tendon.  Keep skin free from extended contact with medical devices.  Encourage functional activity and mobility, as early as tolerated.  Use aids (e.g., slide boards, mechanical lift) during transfer.  Recent Flowsheet Documentation  Taken 12/23/2023 1800 by Annabel Olson RN  Body Position: supine  Taken 12/23/2023 1600 by Annaebl Olson RN  Body Position:   supine, legs elevated   tilted   right  Taken 12/23/2023 1400 by Annabel Olson RN  Body Position:   side-lying   right  Taken 12/23/2023 1200 by Annabel Olson RN  Body Position: sitting up in bed  Taken 12/23/2023 1000 by Annabel Olson RN  Body Position: supine, legs elevated  Taken 12/23/2023 0800 by Annabel Olson RN  Body Position:   side-lying   left  Intervention: Prevent and Manage VTE (Venous Thromboembolism) Risk  Description: Assess for VTE (venous thromboembolism) risk.  Encourage and assist with early ambulation.  Initiate and maintain compression or other therapy, as indicated, based on identified risk in accordance with organizational protocol and provider order.  Encourage both active and passive leg exercises while in bed, if unable to ambulate.  Recent Flowsheet Documentation  Taken 12/23/2023 1800 by Annabel Olson RN  Activity Management: up ad tenzin  Taken 12/23/2023 1600 by Annabel Olson RN  Activity Management: up ad tenzin  Taken 12/23/2023 1400 by Annabel Olson RN  Activity Management: up ad tenzin  Taken 12/23/2023 1200 by Annabel Olson RN  Activity Management: up ad  tenzin  Taken 12/23/2023 1000 by Annabel Olson RN  Activity Management: up ad tenzin  Taken 12/23/2023 0800 by Annabel Olson RN  Activity Management: up ad tenzin  Goal: Optimal Comfort and Wellbeing  12/23/2023 1859 by Annabel Olson RN  Outcome: Ongoing, Progressing  12/23/2023 1858 by Annabel Olson RN  Outcome: Ongoing, Progressing  Intervention: Provide Person-Centered Care  Description: Use a family-focused approach to care.  Develop trust and rapport by proactively providing information, encouraging questions, addressing concerns and offering reassurance.  Acknowledge emotional response to hospitalization.  Recognize and utilize personal coping strategies.  Honor spiritual and cultural preferences.  Recent Flowsheet Documentation  Taken 12/23/2023 0800 by Annabel Olson RN  Trust Relationship/Rapport:   care explained   choices provided   questions answered   questions encouraged   thoughts/feelings acknowledged  Goal: Readiness for Transition of Care  12/23/2023 1859 by Annabel Olson RN  Outcome: Ongoing, Progressing  12/23/2023 1858 by Annabel Olson RN  Outcome: Ongoing, Progressing     Problem: Cardiac-Related Pain (Acute Coronary Syndrome)  Goal: Absence of Cardiac-Related Pain  12/23/2023 1859 by Annabel Olson RN  Outcome: Ongoing, Progressing  12/23/2023 1858 by Annabel Olson RN  Outcome: Ongoing, Progressing     Problem: Hemodynamic Instability (Acute Coronary Syndrome)  Goal: Effective Cardiac Pump Function  12/23/2023 1859 by Annabel Olson RN  Outcome: Ongoing, Progressing  12/23/2023 1858 by Annabel Olson RN  Outcome: Ongoing, Progressing     Problem: Arrhythmia/Dysrhythmia (Cardiac Catheterization)  Goal: Stable Heart Rate and Rhythm  Outcome: Ongoing, Progressing     Problem: Bleeding (Cardiac Catheterization)  Goal: Absence of Bleeding  Outcome: Ongoing, Progressing     Problem: Contrast-Induced Injury Risk (Cardiac  Catheterization)  Goal: Absence of Contrast-Induced Injury  Outcome: Ongoing, Progressing     Problem: Embolism (Cardiac Catheterization)  Goal: Absence of Embolism Signs and Symptoms  Outcome: Ongoing, Progressing     Problem: Ongoing Anesthesia/Sedation Effects (Cardiac Catheterization)  Goal: Anesthesia/Sedation Recovery  Outcome: Ongoing, Progressing  Intervention: Optimize Anesthesia Recovery  Description: Maintain patent airway; position to minimize risk of obstruction and aspiration.  Monitor respiratory status for signs of hypoventilation; provide oxygen therapy judiciously if hypoxemia present.  Monitor level of consciousness and response to verbal and physical stimulation; protect areas of decreased sensation (e.g., anatomical positioning, heat and cold avoidance, medical device or object positioning).  Individualize frequency and intensity of monitoring based on sedation or anesthesia administered, identified risk factors, ongoing assessment and organizational protocol.  Optimize fluid balance; anticipate need for fluid resuscitation.  Prepare for administration of pharmacologic therapy, such as reversal agent, antiemetic or antipruritic medication, to manage sedation or anesthesia effects.  Assess neurocognitive function and risks that may lead to postoperative delirium, such as decreased level of consciousness, pain and agitation; offer reassurance; answer questions.  Adjust environment to maintain safety (e.g., fall precautions, safety equipment).  Recent Flowsheet Documentation  Taken 12/23/2023 1800 by Annabel Olson, RN  Safety Promotion/Fall Prevention:   safety round/check completed   room organization consistent   nonskid shoes/slippers when out of bed   fall prevention program maintained   clutter free environment maintained   assistive device/personal items within reach   activity supervised  Taken 12/23/2023 1600 by Annabel Olson, RN  Safety Promotion/Fall Prevention:   safety  round/check completed   room organization consistent   nonskid shoes/slippers when out of bed   fall prevention program maintained   clutter free environment maintained   assistive device/personal items within reach   activity supervised  Taken 12/23/2023 1400 by Annabel Olson RN  Safety Promotion/Fall Prevention:   safety round/check completed   room organization consistent   nonskid shoes/slippers when out of bed   fall prevention program maintained   clutter free environment maintained   assistive device/personal items within reach   activity supervised  Taken 12/23/2023 1200 by Annabel Olson, RN  Safety Promotion/Fall Prevention:   safety round/check completed   room organization consistent   nonskid shoes/slippers when out of bed   fall prevention program maintained   clutter free environment maintained   assistive device/personal items within reach   activity supervised  Taken 12/23/2023 1000 by Annabel Olson, RN  Safety Promotion/Fall Prevention:   safety round/check completed   room organization consistent   nonskid shoes/slippers when out of bed   fall prevention program maintained   clutter free environment maintained   assistive device/personal items within reach   activity supervised  Taken 12/23/2023 0800 by Annabel Olson, RN  Safety Promotion/Fall Prevention:   safety round/check completed   room organization consistent   nonskid shoes/slippers when out of bed   fall prevention program maintained   clutter free environment maintained   assistive device/personal items within reach   activity supervised     Problem: Pain (Cardiac Catheterization)  Goal: Acceptable Pain Control  Outcome: Ongoing, Progressing  Intervention: Prevent or Manage Pain  Description: Determine pain management plan with patient and caregiver/family; review plan regularly.  Consider the presence and impact of preexisting chronic pain.  Encourage patient and caregiver involvement in pain assessment,  interventions and safety measures.  Evaluate risk for opioid use; individualize pharmacologic pain management plan and titrate medication to patient response.  Combine multimodal analgesia and nonpharmacologic strategies to help potentiate synergistic effects and enhance comfort (e.g., complementary therapy, diversional activity).  Premedicate for anticipated procedure and activity (e.g., precatheterization, prior to sheath removal).  Minimize bedrest following femoral sheath removal (e.g., 2 to 4 hours if no bleeding).  Prevent back pain/discomfort during bedrest (e.g., log roll repositioning, head elevation to 30 degrees, analgesic medication).  Recent Flowsheet Documentation  Taken 12/23/2023 0800 by Annabel Olson RN  Diversional Activities:   television   smartphone     Problem: Vascular Access Protection (Cardiac Catheterization)  Goal: Absence of Vascular Access Complication  Outcome: Ongoing, Progressing  Intervention: Prevent Access Site Complications  Description: Dino pulses on appropriate extremity prior to procedure.  Position and adjust activity to minimize insertion site complications.  Minimize radial insertion site complication by assuring adequate collateral blood flow to hand prior to and following procedure.  Avoid head of bed elevation greater than 30 degrees if femoral sheath present.  Anticipate need for procedural intervention (e.g., vascular repair).  Optimize fluid balance, nutrition, sleep, oxygenation, glycemic control and body temperature to maximize resistance.  Reduce skin microbial count prior to procedure.  Discontinue prophylactic antimicrobial agent within 24 hours after procedure, as directed.  Identify potential sources of infection early; evaluate continued need and advocate for early removal (e.g., lines, devices).  Identify and manage signs of infection.  Recent Flowsheet Documentation  Taken 12/23/2023 1800 by Annabel Olson RN  Activity Management: up ad  tenzin  Taken 12/23/2023 1600 by Annabel Olson, RN  Activity Management: up ad tenzin  Head of Bed (HOB) Positioning: HOB at 30-45 degrees  Taken 12/23/2023 1400 by Annabel Olson, RN  Activity Management: up ad tenzin  Taken 12/23/2023 1200 by Annabel Olson, RN  Activity Management: up ad tenzin  Taken 12/23/2023 1000 by Annabel Olson, RN  Activity Management: up ad tenzin  Taken 12/23/2023 0800 by Annabel Olson, RN  Activity Management: up ad tenzin  Head of Bed (HOB) Positioning: HOB at 20-30 degrees     Problem: Chest Pain  Goal: Resolution of Chest Pain Symptoms  Outcome: Ongoing, Progressing

## 2023-12-23 NOTE — CONSULTS
"[facility]-Cardiology Consult Note    Referring Provider: Vinayak  Reason for Consultation: NSTEMI    Patient Care Team:  Marlen Navarrete MD as PCP - General  Marlen Navrarete MD    Chief complaint : Chest pain    Subjective:    History of present illness: This is a 43-year-old female patient who presents to the emergency room with a 2-day history of burning retrosternal chest discomfort.  Twelve-lead electrocardiogram showed no ischemic ST-T wave changes or injury current.  Cardiac troponins were elevated and have trended upward.  The patient was diagnosed with COVID 6 weeks ago.  Echocardiogram is not available over the long holiday weekend.  She has a history of Adderall use and urine drug screen was positive for amphetamines.  In addition, she uses another amphetamine-based product (Vyvanse).  She reports use of marijuana products in the form of \"edibles\".  She uses an electronic cigarette with a nicotine cartridge.  She has a history of prior smoking.  She has no prior history of myocardial infarction or coronary revascularization.  She has no personal history of hypertension diabetes or dyslipidemia.    Review of Systems   Review of Systems   Constitutional: Negative for chills, diaphoresis, fever, malaise/fatigue, weight gain and weight loss.   HENT:  Negative for ear discharge, hearing loss, hoarse voice and nosebleeds.    Eyes:  Negative for discharge, double vision, pain and photophobia.   Cardiovascular:  Positive for chest pain. Negative for claudication, cyanosis, dyspnea on exertion, irregular heartbeat, leg swelling, near-syncope, orthopnea, palpitations, paroxysmal nocturnal dyspnea and syncope.   Respiratory:  Negative for cough, hemoptysis, sputum production and wheezing.    Endocrine: Negative for cold intolerance, heat intolerance, polydipsia, polyphagia and polyuria.   Hematologic/Lymphatic: Negative for adenopathy and bleeding problem. Does not bruise/bleed easily.   Skin:  Negative for color change, " flushing, itching and rash.   Musculoskeletal:  Negative for muscle cramps, muscle weakness, myalgias and stiffness.   Gastrointestinal:  Negative for abdominal pain, diarrhea, hematemesis, hematochezia, nausea and vomiting.   Genitourinary:  Negative for dysuria, frequency and nocturia.   Neurological:  Negative for focal weakness, loss of balance, numbness, paresthesias and seizures.   Psychiatric/Behavioral:  Negative for altered mental status, hallucinations and suicidal ideas.    Allergic/Immunologic: Negative for HIV exposure, hives and persistent infections.       History  Past Medical History:   Diagnosis Date    ADHD (attention deficit hyperactivity disorder) 10/2002    Allergic 2002    Had prick test done several years ago. Didn't explain sympto    Anemia     Anxiety 10/2006    I have a stressful job    Depression 2018    Diverticulitis     Diverticulitis of colon 2023    Diverticulosis 2023    Diagnosed at [facility].    Exposure to sexually transmitted disease (STD)     Headache     History of anemia     History of body piercing     History of ECG 2016    History of hypertension     History of kidney infection     History of migraine     Hyperlipidemia     Hypertension     no medication required    Low back pain 2016    MRI showed bulging discs at L4 & L5. Treated by Chris Sipple    Migraine 10/05/2023    Obesity 2019    PMS (premenstrual syndrome)     PONV (postoperative nausea and vomiting) 2003    Prehypertension     Recurrent genital herpes     Scoliosis 2008    Muscular-induced scoliosis in upper back.    Urinary tract infection 2004    Varicella 1986    Visual impairment 2002    I wear glasses   ,   Past Surgical History:   Procedure Laterality Date    BREAST AUGMENTATION  2016    BREAST SURGERY  2016    Breast augmentation     SECTION      COSMETIC SURGERY  2016    Breast augmentation    FOREARM FRACTURE SURGERY      SPINE SURGERY   11/2020    Discectomy L5/S1    TUBAL COAGULATION LAPAROSCOPIC N/A 11/20/2023    Procedure: TUBAL FALLOPE FILSHIE CLIPPING LAPAROSCOPIC, hysteroscopy, dilatation curettage, removal of ParaGard IUD,cerene ablation;  Surgeon: Steven Avila MD;  Location[facility];  Service: Obstetrics/Gynecology;  Laterality: N/A;    UMBILICAL HERNIA REPAIR      with mesh    WISDOM TOOTH EXTRACTION     ,   Family History   Problem Relation Age of Onset    Hyperlipidemia Mother     Obesity Mother     Osteoporosis Mother     Arthritis Mother         Arthritis in foot    Diabetes Mother         She is currently pre diabetic    Stroke Father     Arthritis Father     Hyperlipidemia Father     Hypertension Father     Mental illness Father     Migraines Father     Obesity Father     Cancer Father         Squamous cell carcinoma    Drug abuse Father         Lifetime marijuana use    Breast cancer Paternal Grandmother     Arthritis Paternal Uncle         Rheumatoid arthritis    Arthritis Other    ,   Social History     Tobacco Use    Smoking status: Every Day     Types: Electronic Cigarette     Passive exposure: Past    Smokeless tobacco: Never    Tobacco comments:     Not sure when i quit exactly. I never felt particularly addicted so i just quit.   Vaping Use    Vaping Use: Every day    Substances: Nicotine   Substance Use Topics    Alcohol use: Yes     Alcohol/week: 1.0 standard drink of alcohol     Types: 1 Shots of liquor per week     Comment: Occasional alcohol use    Drug use: No   ,   Medications Prior to Admission   Medication Sig Dispense Refill Last Dose    acetaminophen (Tylenol 8 Hour) 650 MG 8 hr tablet Take 1 tablet by mouth Every 8 (Eight) Hours As Needed for Mild Pain . 12 tablet 0     amphetamine-dextroamphetamine (ADDERALL) 10 MG tablet Take 1 tablet by mouth Daily. Takes daily when not taking Vyvanse.       cyclobenzaprine (FLEXERIL) 5 MG tablet Take 1 tablet by mouth At Night As Needed for Muscle Spasms. 30  "tablet 0     dicyclomine (BENTYL) 20 MG tablet Take 1 tablet by mouth Every 6 (Six) Hours As Needed (abdominal cramps). 20 tablet 0     fluticasone (FLONASE) 50 MCG/ACT nasal spray        ibuprofen (ADVIL,MOTRIN) 600 MG tablet Take 1 tablet by mouth Every 6 (Six) Hours As Needed for Mild Pain or Moderate Pain. 30 tablet 1     lisdexamfetamine (VYVANSE) 50 MG capsule Take 1 capsule by mouth Daily Takes daily on the days she works.       oxyCODONE-acetaminophen (PERCOCET) 5-325 MG per tablet Take 1 tablet by mouth Every 6 (Six) Hours As Needed for Severe Pain 6 tablet 0     valACYclovir (VALTREX) 500 MG tablet Take 1 tablet by mouth Daily. (Patient taking differently: Take 1 tablet by mouth As Needed.) 90 tablet 3     and Allergies:  Augmentin [amoxicillin-pot clavulanate] and Bactrim [sulfamethoxazole-trimethoprim]    Objective:    Vital Sign Min/Max for last 24 hours  Temp  Min: 96.5 °F (35.8 °C)  Max: 98.3 °F (36.8 °C)   BP  Min: 140/99  Max: 191/126   Pulse  Min: 69  Max: 100   Resp  Min: 16  Max: 18   SpO2  Min: 98 %  Max: 100 %   No data recorded   Weight  Min: 78.7 kg (173 lb 8 oz)  Max: 80.5 kg (177 lb 6.4 oz)     Flowsheet Rows      Flowsheet Row First Filed Value   Admission Height 175.3 cm (69\") Documented at 12/22/2023 1702   Admission Weight 80.5 kg (177 lb 6.4 oz) Documented at 12/22/2023 1702                 Physical Exam:   Vitals and nursing note reviewed.   Constitutional:       Appearance: Healthy appearance. Not in distress.   Neck:      Vascular: No JVR. JVD normal.   Pulmonary:      Effort: Pulmonary effort is normal.      Breath sounds: Normal breath sounds. No wheezing. No rhonchi. No rales.   Chest:      Chest wall: Not tender to palpatation.   Cardiovascular:      PMI at left midclavicular line. Normal rate. Regular rhythm. Normal S1. Normal S2.       Murmurs: There is no murmur.      No gallop.  No click. No rub.   Pulses:     Intact distal pulses.   Edema:     Peripheral edema absent. "   Abdominal:      General: Bowel sounds are normal.      Palpations: Abdomen is soft.      Tenderness: There is no abdominal tenderness.   Musculoskeletal: Normal range of motion.         General: No tenderness. Skin:     General: Skin is warm and dry.   Neurological:      General: No focal deficit present.      Mental Status: Alert and oriented to person, place and time.         Results Review:   I reviewed the patient's new clinical results.  Results from last 7 days   Lab Units 12/23/23  0514 12/22/23  1852   WBC 10*3/mm3 8.38 7.67   HEMOGLOBIN g/dL 13.4 15.1   HEMATOCRIT % 40.3 45.3   PLATELETS 10*3/mm3 224 256     Results from last 7 days   Lab Units 12/23/23  0514 12/22/23  1732   SODIUM mmol/L 139 140   POTASSIUM mmol/L 3.0* 3.3*   CHLORIDE mmol/L 106 103   CO2 mmol/L 24.6 23.9   BUN mg/dL 9 8   CREATININE mg/dL 0.65 0.78   GLUCOSE mg/dL 122* 115*   CALCIUM mg/dL 9.1 9.2     Lab Results   Lab Value Date/Time    TROPONINT 757 (C) 12/23/2023 0514    TROPONINT 236 (C) 12/22/2023 1852    TROPONINT 246 (C) 12/22/2023 1732     Results from last 7 days   Lab Units 12/23/23  0514   CHOLESTEROL mg/dL 191   TRIGLYCERIDES mg/dL 111   HDL CHOL mg/dL 63*   LDL CHOL mg/dL 108*         Assessment/Plan:      Elevated troponin    Precordial pain    Essential hypertension    Current every day vaping      Possible COVID-related myocarditis.  Suspicion for ACS is low.    I have recommended coronary angiography with interventional standby.  Ms. Jara has been engaged in a patient level discussion explaining the rationale for proceeding with invasive testing.  The procedure of coronary angiography with catheter-based coronary revascularization has been explained in detail, using layman's terminology, including risks benefits and alternatives.  She expresses understanding and desire to proceed.  Further recommendations will be predicated on the results of her catheterization findings.  I would recommend discontinuation of all  amphetamine-based products and other stimulant medication.    I discussed the patient's findings and my recommendations with patient    Jacinto Daly MD  12/23/23  11:56 EST

## 2023-12-23 NOTE — PLAN OF CARE
Goal Outcome Evaluation:  Plan of Care Reviewed With: patient        Progress: no change  Outcome Evaluation: BP elevated at admission but WNL after prn hydralazine; pt had severe headache and nausea overnight; cardiology consult in am-troponin critical this am 529

## 2023-12-23 NOTE — CASE MANAGEMENT/SOCIAL WORK
Discharge Planning Assessment  [facility]     Patient Name: Leigh Negron  MRN: [id number]  Today's Date: 12/23/2023    Admit Date: 12/22/2023    Plan: Home with family   Discharge Needs Assessment       Row Name 12/23/23 0830       Living Environment    People in Home child(alicia), dependent    Current Living Arrangements home    Potentially Unsafe Housing Conditions none    In the past 12 months has the electric, gas, oil, or water company threatened to shut off services in your home? No    Primary Care Provided by self    Provides Primary Care For child(alicia)    Family Caregiver if Needed significant other    Quality of Family Relationships involved    Able to Return to Prior Arrangements yes       Resource/Environmental Concerns    Resource/Environmental Concerns none    Transportation Concerns none       Food Insecurity    Within the past 12 months, you worried that your food would run out before you got the money to buy more. Never true    Within the past 12 months, the food you bought just didn't last and you didn't have money to get more. Never true       Transition Planning    Patient/Family Anticipates Transition to home with family    Patient/Family Anticipated Services at Transition none    Transportation Anticipated car, drives self;family or friend will provide       Discharge Needs Assessment    Readmission Within the Last 30 Days no previous admission in last 30 days    Equipment Currently Used at Home none    Concerns to be Addressed no discharge needs identified    Anticipated Changes Related to Illness none    Equipment Needed After Discharge none    Provided Post Acute Provider List? N/A    Provided Post Acute Provider Quality & Resource List? N/A                   Discharge Plan       Row Name 12/23/23 0831       Plan    Plan Home with family    Plan Comments Spoke to pt regarding discharge plans .Confirmed address and phone number as being correct on face sheet .Independent with ADLS  Lives  with her daughter denies any needs at this time                  Continued Care and Services - Admitted Since 12/22/2023    Coordination has not been started for this encounter.          Demographic Summary       Row Name 12/23/23 0829       General Information    Admission Type observation    Referral Source admission list    Reason for Consult discharge planning                   Functional Status       Row Name 12/23/23 0829       Functional Status    Usual Activity Tolerance excellent       Functional Status, IADL    Medications independent    Meal Preparation independent    Housekeeping independent    Laundry independent    Shopping independent       Mental Status    General Appearance WDL WDL       Employment/    Employment Status employed full-time                   Psychosocial    No documentation.                  Abuse/Neglect    No documentation.                  Legal    No documentation.                  Substance Abuse    No documentation.                  Patient Forms    No documentation.                     Antonieta Ford RN

## 2023-12-23 NOTE — PROGRESS NOTES
"    [facility]    Name:  Leigh Negron   Age:  43 y.o.  Sex:  female  :  1980  MRN:  [id number]   Visit Number:  [id number]  Admission Date:  2023  Date Of Service:  23  Primary Care Physician:  Marlen Navarrete MD     LOS: 0 days :    Chief Complaint:      Burning sensation in the chest.     Subjective:    Patient was seen and examined at bedside today.  Patient was getting ready to go down for heart cath by Dr. Daly.  Patient is NPO.  Reports her chest pain is better however has a headache and feeling nauseous.  She denies any other acute complaints.  Family at bedside.  Nursing at bedside getting ready to take the patient down to Cath Lab.  Hypertensive overnight with a blood pressure of 150s over 100s.  Afebrile and on room air    Hospital Course:    Ms. Jara is a 43-year-old female with history of tobacco abuse, ADHD was brought to the emergency room by family member with symptoms of burning sensation of the chest, body aches for the last 1 day.  Apparently her daughter tested positive for COVID 6 weeks ago and patient herself thought she had COVID at that time as she felt bad for several days afterwards.  She has been doing some drywall work at her home for the last several days and she also vapes.  She denies using any new vapes recently.  She states that she is having severe burning sensation from the lower neck to the epigastric region radiating to her neck and left arm.  This was not associated with any shortness of breath.  She denies any prior history of heart disease and states that she has normal effort tolerance.  No family history of heart disease.  She denies recreational drugs but occasionally uses \"edibles\".     In the emergency room, she was afebrile but initial blood pressure was elevated in the 180s.  Pulse oxygen saturation was 100% on room air.  Blood work including CMP and CBC was fairly unremarkable except for " potassium of 3.3.  Patient's troponin however was 246 with a repeat at 236.  EKG showed inferior wall ST depressions.  C-reactive protein was within normal limits.  COVID-19 test came back positive but the flu test was negative.  Chest x-ray was unremarkable.  She subsequently had CT angiogram of the chest which was negative for pulmonary embolism or infiltrates but showed small right thyroid nodule, recommend correlation with ultrasound.  Patient's condition was discussed with Dr. Daly from cardiology who felt that the patient may have viral induced pericarditis and recommended admission for observation.    Review of Systems:     All systems were reviewed and negative except as mentioned in subjective, assessment and plan.    Vital Signs:    Temp:  [96.5 °F (35.8 °C)-98.3 °F (36.8 °C)] 98.2 °F (36.8 °C)  Heart Rate:  [] 100  Resp:  [16-18] 16  BP: (140-191)/() 146/103    Intake and output:    I/O last 3 completed shifts:  In: 240 [P.O.:240]  Out: -   I/O this shift:  In: 240 [P.O.:240]  Out: -     Physical Examination:    General Appearance:  Alert and cooperative.  No acute distress.   Head:  Atraumatic and normocephalic.   Eyes: Conjunctivae and sclerae normal, no icterus. No pallor.   Throat: No oral lesions, no thrush, oral mucosa moist.   Neck: Supple, trachea midline, no thyromegaly.   Lungs:   Breath sounds heard bilaterally equally.  No wheezing or crackles. No Pleural rub or bronchial breathing.   Heart:  Normal S1 and S2, no murmur, no gallop, no rub. No JVD.   Abdomen:   Normal bowel sounds, no masses, no organomegaly. Soft, nontender, nondistended, no rebound tenderness.   Extremities: Supple, no edema, no cyanosis, no clubbing.   Skin: No bleeding or rash.   Neurologic: Alert and oriented x 3. No facial asymmetry. Moves all four limbs. No tremors.      Laboratory results:    Results from last 7 days   Lab Units 12/23/23  0514 12/22/23  1732   SODIUM mmol/L 139 140   POTASSIUM mmol/L  "3.0* 3.3*   CHLORIDE mmol/L 106 103   CO2 mmol/L 24.6 23.9   BUN mg/dL 9 8   CREATININE mg/dL 0.65 0.78   CALCIUM mg/dL 9.1 9.2   BILIRUBIN mg/dL  --  0.3   ALK PHOS U/L  --  54   ALT (SGPT) U/L  --  26   AST (SGOT) U/L  --  41*   GLUCOSE mg/dL 122* 115*     Results from last 7 days   Lab Units 12/23/23  0514 12/22/23  1852   WBC 10*3/mm3 8.38 7.67   HEMOGLOBIN g/dL 13.4 15.1   HEMATOCRIT % 40.3 45.3   PLATELETS 10*3/mm3 224 256         Results from last 7 days   Lab Units 12/23/23  0514 12/22/23  1852 12/22/23  1732   HSTROP T ng/L 757* 236* 246*         No results for input(s): \"PHART\", \"SRN8LSN\", \"PO2ART\", \"QWQ9BEX\", \"BASEEXCESS\" in the last 8760 hours.   I have reviewed the patient's laboratory results.    Radiology results:    XR Chest 1 View    Result Date: 12/23/2023  PROCEDURE: XR CHEST 1 VW-  INDICATION:  chest pain  FINDINGS:  A portable view of the chest was obtained.  Comparison is made to a prior exam dated 8/25/2016.   The cardiac and mediastinal silhouettes are within normal limits.  The lungs are clear.  There is no pleural effusion or pneumothorax.      Impression: No acute process on this portable exam.   This report was signed and finalized on 12/23/2023 8:23 AM by Zulay Hammond MD.      CT Angiogram Chest Pulmonary Embolism    Result Date: 12/22/2023  FINAL REPORT TECHNIQUE: null CLINICAL HISTORY: chest pain, elevated troponin. covid positive COMPARISON: null FINDINGS: CT angiogram of the chest. Multiplanar MIPS were obtained. No comparison. Findings: No PE is identified. No dissection of the thoracic aorta is seen. Small right thyroid nodule. No pleural or pericardial effusion. Bilateral breast implants. No consolidation. The lungs are well aerated.     Impression: Impression: No PE is identified. Small right thyroid nodule consider correlation with ultrasound. Authenticated and Electronically Signed by Javier Ramos MD on 12/22/2023 08:09:46 PM   I have reviewed the patient's radiology " reports.    Medication Review:     I have reviewed the patient's active and prn medications.     Problem List:      Elevated troponin    Precordial pain    Essential hypertension    Current every day vaping      Assessment:    Chest pain with some typical features, POA.  Elevated troponins with abnormal EKG, POA.  Suspected viral pericarditis/myocarditis, POA.  Essential hypertension, uncontrolled.  Chronic tobacco dependence.  Mild hypokalemia, POA.    Plan:    Chest pain/elevated troponin/abnormal EKG.  - Exact etiology of the patient's chest pain is uncertain but seems to be typical in nature with elevated troponin levels and EKG changes.  - Differential diagnosis includes coronary artery disease as well as viral induced pericarditis/myocarditis.  - Patient has already received full dose aspirin and we will continue her on low-dose aspirin therapy.  - Avoid Lovenox at this time.  - Obtain 2D echocardiogram in the morning.  - We will consult Dr. Daly from cardiology for further recommendations.  - Obtain fasting lipids in a.m. and repeat troponin levels.  - Lortab for pain control.  - Obtain urine drug screen.  - Plan for cardiac cath by Dr. Daly today, further plan after cardiac cath.     Essential hypertension.  - Patient does seem to have elevated blood pressures but does not take any medications at home.  - Nitropaste every 6 hours.  - Start Toprol-XL.  - Hydralazine as needed.     Chronic tobacco dependence.  - Nicotine patch.     -Further orders as indicated per clinical course.  -I have reviewed the copied text and it is accurate as of 12/23/2023     DVT Prophylaxis: SCDs  Code Status: Full  Diet: N.p.o. for Cardiac cath  Discharge Plan: To be determined after cardiac cath    Jose Licea MD  12/23/23  10:53 EST    Dictated utilizing Dragon dictation.

## 2023-12-24 ENCOUNTER — READMISSION MANAGEMENT (OUTPATIENT)
Dept: CALL CENTER | Facility: HOSPITAL | Age: 43
End: 2023-12-24
Payer: COMMERCIAL

## 2023-12-24 ENCOUNTER — APPOINTMENT (OUTPATIENT)
Dept: CARDIOLOGY | Facility: HOSPITAL | Age: 43
DRG: 321 | End: 2023-12-24
Payer: COMMERCIAL

## 2023-12-24 VITALS
BODY MASS INDEX: 26.45 KG/M2 | HEIGHT: 69 IN | HEART RATE: 70 BPM | SYSTOLIC BLOOD PRESSURE: 147 MMHG | WEIGHT: 178.57 LBS | TEMPERATURE: 98 F | OXYGEN SATURATION: 98 % | RESPIRATION RATE: 16 BRPM | DIASTOLIC BLOOD PRESSURE: 99 MMHG

## 2023-12-24 PROBLEM — D89.831 CYTOKINE RELEASE SYNDROME, GRADE 1: Status: ACTIVE | Noted: 2023-12-24

## 2023-12-24 PROBLEM — I21.4 NSTEMI (NON-ST ELEVATED MYOCARDIAL INFARCTION): Status: ACTIVE | Noted: 2023-12-24

## 2023-12-24 LAB
ANION GAP SERPL CALCULATED.3IONS-SCNC: 10.1 MMOL/L (ref 5–15)
BH CV ECHO MEAS - ACS: 2 CM
BH CV ECHO MEAS - AO MAX PG: 4.7 MMHG
BH CV ECHO MEAS - AO MEAN PG: 3 MMHG
BH CV ECHO MEAS - AO ROOT DIAM: 3.1 CM
BH CV ECHO MEAS - AO V2 MAX: 108 CM/SEC
BH CV ECHO MEAS - AO V2 VTI: 22.1 CM
BH CV ECHO MEAS - AVA(I,D): 1.13 CM2
BH CV ECHO MEAS - EDV(CUBED): 84 ML
BH CV ECHO MEAS - EDV(MOD-SP2): 80.6 ML
BH CV ECHO MEAS - EDV(MOD-SP4): 94.8 ML
BH CV ECHO MEAS - EF(MOD-BP): 54.9 %
BH CV ECHO MEAS - EF(MOD-SP2): 54.7 %
BH CV ECHO MEAS - EF(MOD-SP4): 54.5 %
BH CV ECHO MEAS - ESV(CUBED): 21.3 ML
BH CV ECHO MEAS - ESV(MOD-SP2): 36.5 ML
BH CV ECHO MEAS - ESV(MOD-SP4): 43.1 ML
BH CV ECHO MEAS - FS: 36.8 %
BH CV ECHO MEAS - IVS/LVPW: 0.84 CM
BH CV ECHO MEAS - IVSD: 0.77 CM
BH CV ECHO MEAS - LA DIMENSION: 3.7 CM
BH CV ECHO MEAS - LAT PEAK E' VEL: 10.9 CM/SEC
BH CV ECHO MEAS - LV DIASTOLIC VOL/BSA (35-75): 48.2 CM2
BH CV ECHO MEAS - LV MASS(C)D: 116.8 GRAMS
BH CV ECHO MEAS - LV MAX PG: 3.2 MMHG
BH CV ECHO MEAS - LV MEAN PG: 2 MMHG
BH CV ECHO MEAS - LV SYSTOLIC VOL/BSA (12-30): 21.9 CM2
BH CV ECHO MEAS - LV V1 MAX: 89.4 CM/SEC
BH CV ECHO MEAS - LV V1 VTI: 20.4 CM
BH CV ECHO MEAS - LVIDD: 4.4 CM
BH CV ECHO MEAS - LVIDS: 2.8 CM
BH CV ECHO MEAS - LVOT AREA: 1.23 CM2
BH CV ECHO MEAS - LVOT DIAM: 1.25 CM
BH CV ECHO MEAS - LVPWD: 0.92 CM
BH CV ECHO MEAS - MED PEAK E' VEL: 6.9 CM/SEC
BH CV ECHO MEAS - MV A MAX VEL: 61.7 CM/SEC
BH CV ECHO MEAS - MV DEC TIME: 0.17 SEC
BH CV ECHO MEAS - MV E MAX VEL: 90.8 CM/SEC
BH CV ECHO MEAS - MV E/A: 1.47
BH CV ECHO MEAS - MV MAX PG: 3.3 MMHG
BH CV ECHO MEAS - MV MEAN PG: 1 MMHG
BH CV ECHO MEAS - MV V2 VTI: 20.6 CM
BH CV ECHO MEAS - MVA(VTI): 1.22 CM2
BH CV ECHO MEAS - PA ACC TIME: 0.19 SEC
BH CV ECHO MEAS - PA V2 MAX: 84.6 CM/SEC
BH CV ECHO MEAS - RAP SYSTOLE: 3 MMHG
BH CV ECHO MEAS - RV MAX PG: 2.26 MMHG
BH CV ECHO MEAS - RV V1 MAX: 75.1 CM/SEC
BH CV ECHO MEAS - RV V1 VTI: 13.5 CM
BH CV ECHO MEAS - RVSP: 24.5 MMHG
BH CV ECHO MEAS - SI(MOD-SP2): 22.4 ML/M2
BH CV ECHO MEAS - SI(MOD-SP4): 26.3 ML/M2
BH CV ECHO MEAS - SV(LVOT): 25 ML
BH CV ECHO MEAS - SV(MOD-SP2): 44.1 ML
BH CV ECHO MEAS - SV(MOD-SP4): 51.7 ML
BH CV ECHO MEAS - TAPSE (>1.6): 2.02 CM
BH CV ECHO MEAS - TR MAX PG: 21.5 MMHG
BH CV ECHO MEAS - TR MAX VEL: 232 CM/SEC
BH CV ECHO MEASUREMENTS AVERAGE E/E' RATIO: 10.2
BH CV XLRA - RV BASE: 2.7 CM
BH CV XLRA - RV LENGTH: 6.4 CM
BH CV XLRA - RV MID: 2.5 CM
BH CV XLRA - TDI S': 11.7 CM/SEC
BUN SERPL-MCNC: 6 MG/DL (ref 6–20)
BUN/CREAT SERPL: 11.3 (ref 7–25)
CALCIUM SPEC-SCNC: 8.4 MG/DL (ref 8.6–10.5)
CHLORIDE SERPL-SCNC: 108 MMOL/L (ref 98–107)
CHOLEST SERPL-MCNC: 167 MG/DL (ref 0–200)
CO2 SERPL-SCNC: 21.9 MMOL/L (ref 22–29)
CREAT SERPL-MCNC: 0.53 MG/DL (ref 0.57–1)
DEPRECATED RDW RBC AUTO: 42.8 FL (ref 37–54)
EGFRCR SERPLBLD CKD-EPI 2021: 118.6 ML/MIN/1.73
ERYTHROCYTE [DISTWIDTH] IN BLOOD BY AUTOMATED COUNT: 13.1 % (ref 12.3–15.4)
GLUCOSE SERPL-MCNC: 91 MG/DL (ref 65–99)
HBA1C MFR BLD: 5 % (ref 4.8–5.6)
HCT VFR BLD AUTO: 40.3 % (ref 34–46.6)
HDLC SERPL-MCNC: 58 MG/DL (ref 40–60)
HGB BLD-MCNC: 13 G/DL (ref 12–15.9)
LDLC SERPL CALC-MCNC: 92 MG/DL (ref 0–100)
LDLC/HDLC SERPL: 1.56 {RATIO}
LEFT ATRIUM VOLUME INDEX: 17.8 ML/M2
LV EF 2D ECHO EST: 56 %
MCH RBC QN AUTO: 28.6 PG (ref 26.6–33)
MCHC RBC AUTO-ENTMCNC: 32.3 G/DL (ref 31.5–35.7)
MCV RBC AUTO: 88.6 FL (ref 79–97)
PLATELET # BLD AUTO: 230 10*3/MM3 (ref 140–450)
PMV BLD AUTO: 11.6 FL (ref 6–12)
POTASSIUM SERPL-SCNC: 3.8 MMOL/L (ref 3.5–5.2)
RBC # BLD AUTO: 4.55 10*6/MM3 (ref 3.77–5.28)
SODIUM SERPL-SCNC: 140 MMOL/L (ref 136–145)
TRIGL SERPL-MCNC: 94 MG/DL (ref 0–150)
VLDLC SERPL-MCNC: 17 MG/DL (ref 5–40)
WBC NRBC COR # BLD AUTO: 7.34 10*3/MM3 (ref 3.4–10.8)

## 2023-12-24 PROCEDURE — 80048 BASIC METABOLIC PNL TOTAL CA: CPT | Performed by: INTERNAL MEDICINE

## 2023-12-24 PROCEDURE — 25010000002 SULFUR HEXAFLUORIDE MICROSPH 60.7-25 MG RECONSTITUTED SUSPENSION: Performed by: FAMILY MEDICINE

## 2023-12-24 PROCEDURE — 85027 COMPLETE CBC AUTOMATED: CPT | Performed by: INTERNAL MEDICINE

## 2023-12-24 PROCEDURE — 83036 HEMOGLOBIN GLYCOSYLATED A1C: CPT | Performed by: INTERNAL MEDICINE

## 2023-12-24 PROCEDURE — 80061 LIPID PANEL: CPT | Performed by: INTERNAL MEDICINE

## 2023-12-24 PROCEDURE — 99238 HOSP IP/OBS DSCHRG MGMT 30/<: CPT | Performed by: FAMILY MEDICINE

## 2023-12-24 PROCEDURE — 93306 TTE W/DOPPLER COMPLETE: CPT | Performed by: INTERNAL MEDICINE

## 2023-12-24 PROCEDURE — 99231 SBSQ HOSP IP/OBS SF/LOW 25: CPT | Performed by: INTERNAL MEDICINE

## 2023-12-24 PROCEDURE — 93306 TTE W/DOPPLER COMPLETE: CPT

## 2023-12-24 RX ORDER — NICOTINE 21 MG/24HR
1 PATCH, TRANSDERMAL 24 HOURS TRANSDERMAL EVERY 24 HOURS
Qty: 14 PATCH | Refills: 0 | Status: SHIPPED | OUTPATIENT
Start: 2023-12-24 | End: 2024-01-02

## 2023-12-24 RX ORDER — ASPIRIN 325 MG
325 TABLET, DELAYED RELEASE (ENTERIC COATED) ORAL DAILY
Qty: 90 TABLET | Refills: 0 | Status: SHIPPED | OUTPATIENT
Start: 2023-12-25 | End: 2024-03-24

## 2023-12-24 RX ORDER — ATORVASTATIN CALCIUM 80 MG/1
80 TABLET, FILM COATED ORAL NIGHTLY
Qty: 90 TABLET | Refills: 0 | Status: SHIPPED | OUTPATIENT
Start: 2023-12-24 | End: 2023-12-26 | Stop reason: SDUPTHER

## 2023-12-24 RX ORDER — CLOPIDOGREL BISULFATE 75 MG/1
75 TABLET ORAL DAILY
Qty: 90 TABLET | Refills: 0 | Status: SHIPPED | OUTPATIENT
Start: 2023-12-25 | End: 2024-03-24

## 2023-12-24 RX ORDER — METOPROLOL SUCCINATE 25 MG/1
25 TABLET, EXTENDED RELEASE ORAL
Qty: 30 TABLET | Refills: 2 | OUTPATIENT
Start: 2023-12-25 | End: 2023-12-26

## 2023-12-24 RX ADMIN — ASPIRIN 325 MG: 325 TABLET, COATED ORAL at 09:50

## 2023-12-24 RX ADMIN — DOCUSATE SODIUM 50MG AND SENNOSIDES 8.6MG 2 TABLET: 8.6; 5 TABLET, FILM COATED ORAL at 09:48

## 2023-12-24 RX ADMIN — Medication 10 ML: at 09:48

## 2023-12-24 RX ADMIN — CLOPIDOGREL BISULFATE 75 MG: 75 TABLET ORAL at 09:50

## 2023-12-24 RX ADMIN — METOPROLOL SUCCINATE 25 MG: 25 TABLET, EXTENDED RELEASE ORAL at 09:47

## 2023-12-24 RX ADMIN — SULFUR HEXAFLUORIDE 3 ML: KIT at 08:42

## 2023-12-24 NOTE — CASE MANAGEMENT/SOCIAL WORK
Case Management Discharge Note           Provided Post Acute Provider List?: N/A  Provided Post Acute Provider Quality & Resource List?: N/A    Selected Continued Care - Admitted Since 12/22/2023       Destination    No services have been selected for the patient.                Durable Medical Equipment    No services have been selected for the patient.                Dialysis/Infusion    No services have been selected for the patient.                Home Medical Care    No services have been selected for the patient.                Therapy    No services have been selected for the patient.                Community Resources    No services have been selected for the patient.                Community & DME    No services have been selected for the patient.                    Transportation Services  Private: Car    Final Discharge Disposition Code: 01 - home or self-care

## 2023-12-24 NOTE — OUTREACH NOTE
Prep Survey      Flowsheet Row Responses   [facility] facility patient discharged from? [place]   Is LACE score < 7 ? Yes   Eligibility [facility]   Date of Admission 12/22/23   Date of Discharge 12/24/23   Discharge Disposition Home or Self Care   Discharge diagnosis NSTEMI, heart cath & stents   Does the patient have one of the following disease processes/diagnoses(primary or secondary)? Acute MI (STEMI,NSTEMI)   Does the patient have Home health ordered? No   Is there a DME ordered? No   Prep survey completed? Yes            Caitie Box Registered Nurse

## 2023-12-24 NOTE — PROGRESS NOTES
"G-Cardiology Progress note     LOS: 0 days   Patient Care Team:  Marlen Navarrete MD as PCP - General  Marlen Navarrete MD    Chief Complaint: Chest pain    Subjective:    Interval History: No recurrent angina.  Echocardiogram shows preserved global and regional LV systolic function.    Patient Complaints: none  Patient Denies:   Chest pain, shortness of breath, orthopnea, peripheral edema, palpitations, cough, sputum production, hemoptysis, abdominal pain, nausea, vomiting, diarrhea, fevers chills or night sweats  History taken from: patient family    Review of Systems:   All systems were reviewed and negative       Objective:    Vital Sign Min/Max for last 24 hours  Temp  Min: 98 °F (36.7 °C)  Max: 98.9 °F (37.2 °C)   BP  Min: 103/72  Max: 167/110   Pulse  Min: 61  Max: 109   Resp  Min: 12  Max: 18   SpO2  Min: 97 %  Max: 100 %   Flow (L/min)  Min: 2  Max: 2   Weight  Min: 81 kg (178 lb 9.2 oz)  Max: 81 kg (178 lb 9.2 oz)     Flowsheet Rows      Flowsheet Row First Filed Value   Admission Height 175.3 cm (69\") Documented at 12/22/2023 1702   Admission Weight 80.5 kg (177 lb 6.4 oz) Documented at 12/22/2023 1702            Physical Exam:     General Appearance:  Alert, cooperative, in no acute distress   Head:  Normocephalic, without obvious abnormality, atraumatic   Eyes:  Lids and lashes normal, conjunctivae and sclerae normal, no icterus, no pallor, corneas clear, PERRLA   Ears:  Ears appear intact with no abnormalities noted   Throat:  No oral lesions, no thrush, oral mucosa moist   Neck:  No adenopathy, supple, trachea midline, no thyromegaly, no carotid bruit, no JVD   Back:  No kyphosis present, no scoliosis present, no skin lesions, erythema or scars, no tenderness to percussion, or palpation, range of motion normal   Lungs:  Clear to auscultation,respirations regular, even and unlabored    Heart:  Regular rhythm and normal rate, normal S1 and S2, no murmur, no gallop, no rub, no click   Breast Exam:  " Deferred   Abdomen:  Normal bowel sounds, no masses, no organomegaly, soft non-tender, non-distended, no guarding, no rebound tenderness   Genitalia:  Deferred   Extremities:  Moves all extremities well, no edema, no cyanosis, no redness   Pulses:  Pulses palpable and equal bilaterally   Skin:  No bleeding, bruising or rash   Lymph nodes:  No palpable adenopathy   Neurologic:  Cranial nerves 2 - 12 grossly intact, sensation intact, DTR present and equal bilaterally        Results Review:     I reviewed the patient's new clinical results.  Results from last 7 days   Lab Units 12/24/23  0620   HEMOGLOBIN A1C % 5.00     Results from last 7 days   Lab Units 12/24/23  0620   SODIUM mmol/L 140   POTASSIUM mmol/L 3.8   CHLORIDE mmol/L 108*   CO2 mmol/L 21.9*   BUN mg/dL 6   CREATININE mg/dL 0.53*   GLUCOSE mg/dL 91   CALCIUM mg/dL 8.4*     Results from last 7 days   Lab Units 12/24/23  0620 12/23/23  0514 12/22/23  1852   WBC 10*3/mm3 7.34 8.38 7.67   HEMOGLOBIN g/dL 13.0 13.4 15.1   HEMATOCRIT % 40.3 40.3 45.3   PLATELETS 10*3/mm3 230 224 256             Echo EF Estimated  Lab Results   Component Value Date    ECHOEFEST 56.0 12/24/2023         Physical Exam    Medication Review: yes    Assessment/Plan:      NSTEMI, initial episode of care    COVID-19    Elevated troponin    Precordial pain    Essential hypertension    Current every day vaping    NSTEMI (non-ST elevated myocardial infarction)      SCAD.  Status post multiple stents placed in largest obtuse marginal branch of circumflex.  Angiographically the patient has likely had a prior episode of SCAD in the mid LAD which was clinically silent.  The true nature of her SCAD pathology was only confirmed after initial coronary stent placement.  This will likely continue to heal spontaneously. SCAD was almost certainly related to use of 2 different prescription amphetamine medications.    The patient has been counseled to avoid all amphetamine-based products and other  stimulant medication for ADD.    The patient will need a full 1 year of uninterrupted dual antiplatelet therapy.  At discharge, she should receive a quantity of 90 Plavix for a full month supply.    The patient will require outpatient follow-up in cardiology clinic within 1 month of discharge.  Unfortunately, due to the unavailability of physician  cardiologist in clinic in the month of January she may require outpatient clinic follow-up with either Dr. Perla or Dr. Miranda at the [facility] location.    Plan for disposition:Where: home and When:  today    Jacinto Daly MD  12/24/23  12:24 EST

## 2023-12-24 NOTE — DISCHARGE SUMMARY
[facility]      Name:  Leigh Negron   Age:  43 y.o.  Sex:  female  :  1980  MRN:  [id number]   Visit Number:  [id number]    Admission Date:  2023  Date of Discharge:  2023  Primary Care Physician:  Marlen Navarrete MD    Important issues to note:    -Continue dual antiplatelets with aspirin and Plavix for 12 months, continue with aspirin after that indefinitely.  -Started on metoprolol and Lipitor  -Discontinuation of all amphetamine-based products/prescriptions (amphetamine-based and/or stimulant based pharmaceuticals are now considered strictly contraindicated)  -Discontinuation of all nicotine products  -Outpatient cardiac rehab/follow-up  - Follow-up with PCP for continued care and discuss medication changes    Discharge Diagnoses:     NSTEMI, POA  SCAD involving the large OM1, Status post multiple stents   Chest pain, POA.  Elevated troponins with abnormal EKG, POA.  Suspected viral pericarditis/myocarditis, POA.  Essential hypertension, uncontrolled.  Chronic tobacco dependence.  Mild hypokalemia, POA.    Problem List:     Active Hospital Problems    Diagnosis  POA    **NSTEMI, initial episode of care [I21.4]  Yes    NSTEMI (non-ST elevated myocardial infarction) [I21.4]  Yes    Cytokine release syndrome, grade 1 [D89.831]  No    Elevated troponin [R79.89]  Yes    Precordial pain [R07.2]  Yes    Essential hypertension [I10]  Yes    Current every day vaping [Z72.89]  Not Applicable    COVID-19 [U07.1]  Unknown      Resolved Hospital Problems   No resolved problems to display.     Presenting Problem:    Chief Complaint   Patient presents with    Pain With Breathing      Consults:     Consulting Physician(s)         Provider   Role Jacinto Salinas MD  Consulting Physician Cardiology          Procedures Performed:    Procedure(s):  Coronary angiography    History of presenting illness/Hospital Course:    Ms. Jara is a  "43-year-old female with history of tobacco abuse, ADHD was brought to the emergency room by family member with symptoms of burning sensation of the chest, body aches for the last 1 day.  Apparently her daughter tested positive for COVID 6 weeks ago and patient herself thought she had COVID at that time as she felt bad for several days afterwards.  She has been doing some drywall work at her home for the last several days and she also vapes.  She denies using any new vapes recently.  She states that she is having severe burning sensation from the lower neck to the epigastric region radiating to her neck and left arm.  This was not associated with any shortness of breath.  She denies any prior history of heart disease and states that she has normal effort tolerance.  No family history of heart disease.  She denies recreational drugs but occasionally uses \"edibles\".     In the emergency room, she was afebrile but initial blood pressure was elevated in the 180s.  Pulse oxygen saturation was 100% on room air.  Blood work including CMP and CBC was fairly unremarkable except for potassium of 3.3.  Patient's troponin however was 246 with a repeat at 236.  EKG showed inferior wall ST depressions.  C-reactive protein was within normal limits.  COVID-19 test came back positive but the flu test was negative.  Chest x-ray was unremarkable.  She subsequently had CT angiogram of the chest which was negative for pulmonary embolism or infiltrates but showed small right thyroid nodule, recommend correlation with ultrasound.  Patient's condition was discussed with Dr. Daly from cardiology who felt that the patient may have viral induced pericarditis and recommended admission for observation.    Chest pain/elevated troponin/abnormal EKG.  -Patient had heart cath on 12/23 with Dr. Daly and was found to have SCAD. Status post multiple stents placed in largest obtuse marginal branch of circumflex. SCAD was almost certainly related " to use of 2 different prescription amphetamine medications.  -The patient has been counseled to avoid all amphetamine-based products and other stimulant medication for ADD.  -2D echo with a EF of 56%.     Essential hypertension.  - Patient does seem to have elevated blood pressures but does not take any medications at home.  - Started Toprol-XL.     Chronic tobacco dependence.  - Nicotine patch.  -Smoking cessation discussed    Right thyroid nodule  -Found incidentally on scan  -Instructed to follow-up with PCP get an ultrasound as an outpatient.    Patient was seen and examined on the day of discharge.  Patient ambulatory in the room with no distress and appears very comfortable.  Patient denies any chest pain or trouble breathing.  She reports feeling significantly better today and is eager to go home.  Patient is otherwise asymptomatic today.  Denies any acute complaints.  Patient cleared by cardiology to be discharged with plan on following up with cardiology in the office/cardiac rehab. Patient instructed on management and plan in details.  Patient counseled on smoking cessation and avoiding any amphetamine/stimulants containing medicines. Patient to follow-up with PCP in 2 to 3 days for recheck and continued care.  I did discuss the thyroid nodule with the patient and instructed her to follow-up with her PCP for an ultrasound.  Clear return precautions discussed.  Patient verbalized understanding and agreeable to plan.  All questions were answered to the patient's satisfaction.      Vital Signs:    Temp:  [98 °F (36.7 °C)-98.9 °F (37.2 °C)] 98 °F (36.7 °C)  Heart Rate:  [] 70  Resp:  [16-18] 16  BP: (103-150)/(70-99) 147/99    Physical Exam:    General Appearance:  Alert and cooperative.  No acute distress.   Head:  Atraumatic and normocephalic.   Eyes: Conjunctivae and sclerae normal, no icterus. No pallor.   Ears:  Ears with no abnormalities noted.   Throat: No oral lesions, no thrush, oral mucosa  moist.   Neck: Supple, trachea midline, no thyromegaly.   Back:   No kyphoscoliosis present. No tenderness to palpation.   Lungs:   Breath sounds heard bilaterally equally.  No crackles or wheezing. No Pleural rub or bronchial breathing.   Heart:  Normal S1 and S2, no murmur, no gallop, no rub. No JVD.   Abdomen:   Normal bowel sounds, no masses, no organomegaly. Soft, nontender, nondistended, no rebound tenderness.   Extremities: Supple, no edema, no cyanosis, no clubbing.   Pulses: Pulses palpable bilaterally.   Skin: No bleeding or rash.   Neurologic: Alert and oriented x 3. No facial asymmetry. Moves all four limbs. No tremors.     Pertinent Lab Results:     Results from last 7 days   Lab Units 12/24/23  0620 12/23/23 2120 12/23/23  0514 12/22/23  1732   SODIUM mmol/L 140  --  139 140   POTASSIUM mmol/L 3.8 4.0 3.0* 3.3*   CHLORIDE mmol/L 108*  --  106 103   CO2 mmol/L 21.9*  --  24.6 23.9   BUN mg/dL 6  --  9 8   CREATININE mg/dL 0.53*  --  0.65 0.78   CALCIUM mg/dL 8.4*  --  9.1 9.2   BILIRUBIN mg/dL  --   --   --  0.3   ALK PHOS U/L  --   --   --  54   ALT (SGPT) U/L  --   --   --  26   AST (SGOT) U/L  --   --   --  41*   GLUCOSE mg/dL 91  --  122* 115*     Results from last 7 days   Lab Units 12/24/23  0620 12/23/23  0514 12/22/23  1852   WBC 10*3/mm3 7.34 8.38 7.67   HEMOGLOBIN g/dL 13.0 13.4 15.1   HEMATOCRIT % 40.3 40.3 45.3   PLATELETS 10*3/mm3 230 224 256         Results from last 7 days   Lab Units 12/23/23  0514 12/22/23  1852 12/22/23  1732   HSTROP T ng/L 757* 236* 246*                           Pertinent Radiology Results:    Imaging Results (All)       Procedure Component Value Units Date/Time    XR Chest 1 View [[id number]] Collected: 12/23/23 0823     Updated: 12/23/23 0825    Narrative:      PROCEDURE: XR CHEST 1 VW-     INDICATION:  chest pain     FINDINGS:  A portable view of the chest was obtained.  Comparison is  made to a prior exam dated 8/25/2016.   The cardiac and  mediastinal  silhouettes are within normal limits.  The lungs are clear.  There is no  pleural effusion or pneumothorax.       Impression:      No acute process on this portable exam.        This report was signed and finalized on 12/23/2023 8:23 AM by Zulay Hammond MD.       CT Angiogram Chest Pulmonary Embolism [[id number]] Collected: 12/22/23 2009     Updated: 12/22/23 2011    Narrative:      FINAL REPORT    TECHNIQUE:  null    CLINICAL HISTORY:  chest pain, elevated troponin. covid positive    COMPARISON:  null    FINDINGS:  CT angiogram of the chest.    Multiplanar MIPS were obtained.    No comparison.    Findings:    No PE is identified.    No dissection of the thoracic aorta is seen.    Small right thyroid nodule.    No pleural or pericardial effusion.    Bilateral breast implants.    No consolidation.    The lungs are well aerated.      Impression:      Impression:    No PE is identified.    Small right thyroid nodule consider correlation with ultrasound.    Authenticated and Electronically Signed by Javier Ramos MD on  12/22/2023 08:09:46 PM            Echo:    Results for orders placed during the hospital encounter of 12/22/23    Adult Transthoracic Echo Complete W/ Cont if Necessary Per Protocol    Interpretation Summary    Left ventricular systolic function is normal. Estimated left ventricular EF = 56% Left ventricular ejection fraction appears to be 56 - 60%.    Left ventricular diastolic function was normal.    Estimated right ventricular systolic pressure from tricuspid regurgitation is normal (<35 mmHg).    Condition on Discharge:      Stable.    Code status during the hospital stay:    Code Status and Medical Interventions:   Ordered at: 12/22/23 2203     Code Status (Patient has no pulse and is not breathing):    CPR (Attempt to Resuscitate)     Medical Interventions (Patient has pulse or is breathing):    Full Support     Discharge Disposition:    Home or Self Care    Discharge Medications:        Discharge Medications        New Medications        Instructions Start Date   aspirin 325 MG EC tablet   325 mg, Oral, Daily   Start Date: December 25, 2023     atorvastatin 80 MG tablet  Commonly known as: LIPITOR   80 mg, Oral, Nightly      clopidogrel 75 MG tablet  Commonly known as: PLAVIX   75 mg, Oral, Daily   Start Date: December 25, 2023     metoprolol succinate XL 25 MG 24 hr tablet  Commonly known as: TOPROL-XL   25 mg, Oral, Every 24 Hours Scheduled   Start Date: December 25, 2023     nicotine 21 MG/24HR patch  Commonly known as: NICODERM CQ   1 patch, Transdermal, Every 24 Hours             Changes to Medications        Instructions Start Date   valACYclovir 500 MG tablet  Commonly known as: VALTREX  What changed:   when to take this  reasons to take this   500 mg, Oral, Daily             Continue These Medications        Instructions Start Date   acetaminophen 650 MG 8 hr tablet  Commonly known as: Tylenol 8 Hour   650 mg, Oral, Every 8 Hours PRN      cyclobenzaprine 5 MG tablet  Commonly known as: FLEXERIL   5 mg, Oral, Nightly PRN      dicyclomine 20 MG tablet  Commonly known as: BENTYL   20 mg, Oral, Every 6 Hours PRN      fluticasone 50 MCG/ACT nasal spray  Commonly known as: FLONASE   No dose, route, or frequency recorded.      ibuprofen 600 MG tablet  Commonly known as: ADVIL,MOTRIN   600 mg, Oral, Every 6 Hours PRN      oxyCODONE-acetaminophen 5-325 MG per tablet  Commonly known as: PERCOCET   Take 1 tablet by mouth Every 6 (Six) Hours As Needed for Severe Pain             Stop These Medications      amphetamine-dextroamphetamine 10 MG tablet  Commonly known as: ADDERALL     lisdexamfetamine 50 MG capsule  Commonly known as: VYVANSE            Discharge Diet:   Cardiac    Activity at Discharge:   As tolerated    Follow-up Appointments:    Additional Instructions for the Follow-ups that You Need to Schedule       Ambulatory Referral to Cardiac Rehab   As directed             Follow-up  Information       Moose Perla MD Follow up in 1 month(s).    Specialties: Cardiology, Interventional Cardiology  Why: SCAD  Contact information:  [address]  [address]  [facility] [address]  [phone]               Marlen Navarrete MD Follow up in 3 day(s).    Specialties: Family Medicine, Emergency Medicine  Contact information:  [address] [place] [address]  [phone]               [facility] CARDIAC REHAB .    Specialty: Cardiac Rehabilitation  Contact information:  Medical Office [place] [address]  [address] [facility] [address] [phone]                         No future appointments.  Test Results Pending at Discharge:           Jose Licea MD  12/24/23  12:57 EST    Time: I spent 28 minutes on this discharge activity which included: face-to-face encounter with the patient, reviewing the data in the system, coordination of the care with the nursing staff as well as consultants, documentation, and entering orders.     Dictated utilizing Dragon dictation.

## 2023-12-24 NOTE — PLAN OF CARE
Goal Outcome Evaluation:  Plan of Care Reviewed With: patient        Progress: no change  Outcome Evaluation: VSS; NSR; no chest pain or nausea overnight

## 2023-12-24 NOTE — DISCHARGE INSTRUCTIONS
-Take aspirin and Plavix both for 12 months as directed, then continue with aspirin alone after that.  -Take Lipitor and metoprolol as prescribed.  -discontinue taking Adderall and Vyvanse, avoid all amphetamine-based products and other stimulant medication for ADD.   -Avoid smoking cigarettes and use nicotine patches as prescribed.  -Follow-up with cardiology in the office/ cardiac rehab as scheduled in 1 month.  -Follow-up with PCP in 2 to 3 days for recheck and continued care and discuss medication changes.  Please talk to your PCP on thyroid nodule for an ultrasound as an outpatient as discussed.

## 2023-12-26 ENCOUNTER — HOSPITAL ENCOUNTER (EMERGENCY)
Facility: HOSPITAL | Age: 43
Discharge: HOME | End: 2023-12-26
Attending: STUDENT IN AN ORGANIZED HEALTH CARE EDUCATION/TRAINING PROGRAM
Payer: COMMERCIAL

## 2023-12-26 ENCOUNTER — APPOINTMENT (OUTPATIENT)
Dept: GENERAL RADIOLOGY | Facility: HOSPITAL | Age: 43
End: 2023-12-26
Payer: COMMERCIAL

## 2023-12-26 ENCOUNTER — TRANSITIONAL CARE MANAGEMENT TELEPHONE ENCOUNTER (OUTPATIENT)
Dept: CALL CENTER | Facility: HOSPITAL | Age: 43
End: 2023-12-26
Payer: COMMERCIAL

## 2023-12-26 ENCOUNTER — TELEPHONE (OUTPATIENT)
Dept: TELEMETRY | Facility: HOSPITAL | Age: 43
End: 2023-12-26
Payer: COMMERCIAL

## 2023-12-26 VITALS
SYSTOLIC BLOOD PRESSURE: 146 MMHG | HEIGHT: 69 IN | DIASTOLIC BLOOD PRESSURE: 109 MMHG | WEIGHT: 180 LBS | RESPIRATION RATE: 20 BRPM | TEMPERATURE: 98.8 F | OXYGEN SATURATION: 99 % | BODY MASS INDEX: 26.66 KG/M2 | HEART RATE: 91 BPM

## 2023-12-26 DIAGNOSIS — R07.9 CHEST PAIN, UNSPECIFIED TYPE: Primary | ICD-10-CM

## 2023-12-26 LAB
ALBUMIN SERPL-MCNC: 4.3 G/DL (ref 3.5–5.2)
ALBUMIN/GLOB SERPL: 1.7 G/DL
ALP SERPL-CCNC: 61 U/L (ref 39–117)
ALT SERPL W P-5'-P-CCNC: 47 U/L (ref 1–33)
ANION GAP SERPL CALCULATED.3IONS-SCNC: 13.6 MMOL/L (ref 5–15)
AST SERPL-CCNC: 42 U/L (ref 1–32)
BASOPHILS # BLD AUTO: 0.04 10*3/MM3 (ref 0–0.2)
BASOPHILS NFR BLD AUTO: 0.5 % (ref 0–1.5)
BILIRUB SERPL-MCNC: 0.2 MG/DL (ref 0–1.2)
BUN SERPL-MCNC: 12 MG/DL (ref 6–20)
BUN/CREAT SERPL: 18.2 (ref 7–25)
CALCIUM SPEC-SCNC: 9.6 MG/DL (ref 8.6–10.5)
CHLORIDE SERPL-SCNC: 104 MMOL/L (ref 98–107)
CO2 SERPL-SCNC: 22.4 MMOL/L (ref 22–29)
CREAT SERPL-MCNC: 0.66 MG/DL (ref 0.57–1)
DEPRECATED RDW RBC AUTO: 41.1 FL (ref 37–54)
EGFRCR SERPLBLD CKD-EPI 2021: 112.5 ML/MIN/1.73
EOSINOPHIL # BLD AUTO: 0.07 10*3/MM3 (ref 0–0.4)
EOSINOPHIL NFR BLD AUTO: 0.9 % (ref 0.3–6.2)
ERYTHROCYTE [DISTWIDTH] IN BLOOD BY AUTOMATED COUNT: 12.7 % (ref 12.3–15.4)
GEN 5 2HR TROPONIN T REFLEX: 786 NG/L
GLOBULIN UR ELPH-MCNC: 2.6 GM/DL
GLUCOSE SERPL-MCNC: 86 MG/DL (ref 65–99)
HCT VFR BLD AUTO: 41.5 % (ref 34–46.6)
HGB BLD-MCNC: 13.9 G/DL (ref 12–15.9)
HOLD SPECIMEN: NORMAL
HOLD SPECIMEN: NORMAL
IMM GRANULOCYTES # BLD AUTO: 0.02 10*3/MM3 (ref 0–0.05)
IMM GRANULOCYTES NFR BLD AUTO: 0.3 % (ref 0–0.5)
LYMPHOCYTES # BLD AUTO: 1.43 10*3/MM3 (ref 0.7–3.1)
LYMPHOCYTES NFR BLD AUTO: 19.3 % (ref 19.6–45.3)
MCH RBC QN AUTO: 29.6 PG (ref 26.6–33)
MCHC RBC AUTO-ENTMCNC: 33.5 G/DL (ref 31.5–35.7)
MCV RBC AUTO: 88.5 FL (ref 79–97)
MONOCYTES # BLD AUTO: 0.45 10*3/MM3 (ref 0.1–0.9)
MONOCYTES NFR BLD AUTO: 6.1 % (ref 5–12)
NEUTROPHILS NFR BLD AUTO: 5.39 10*3/MM3 (ref 1.7–7)
NEUTROPHILS NFR BLD AUTO: 72.9 % (ref 42.7–76)
NRBC BLD AUTO-RTO: 0 /100 WBC (ref 0–0.2)
PLATELET # BLD AUTO: 247 10*3/MM3 (ref 140–450)
PMV BLD AUTO: 11.2 FL (ref 6–12)
POTASSIUM SERPL-SCNC: 3.9 MMOL/L (ref 3.5–5.2)
PROT SERPL-MCNC: 6.9 G/DL (ref 6–8.5)
RBC # BLD AUTO: 4.69 10*6/MM3 (ref 3.77–5.28)
SODIUM SERPL-SCNC: 140 MMOL/L (ref 136–145)
TROPONIN T DELTA: 46 NG/L
TROPONIN T SERPL HS-MCNC: 740 NG/L
WBC NRBC COR # BLD AUTO: 7.4 10*3/MM3 (ref 3.4–10.8)
WHOLE BLOOD HOLD COAG: NORMAL
WHOLE BLOOD HOLD SPECIMEN: NORMAL

## 2023-12-26 PROCEDURE — 36415 COLL VENOUS BLD VENIPUNCTURE: CPT

## 2023-12-26 PROCEDURE — 25010000002 LABETALOL 5 MG/ML SOLUTION

## 2023-12-26 PROCEDURE — 93005 ELECTROCARDIOGRAM TRACING: CPT | Performed by: STUDENT IN AN ORGANIZED HEALTH CARE EDUCATION/TRAINING PROGRAM

## 2023-12-26 PROCEDURE — 96375 TX/PRO/DX INJ NEW DRUG ADDON: CPT

## 2023-12-26 PROCEDURE — 93005 ELECTROCARDIOGRAM TRACING: CPT

## 2023-12-26 PROCEDURE — 99284 EMERGENCY DEPT VISIT MOD MDM: CPT

## 2023-12-26 PROCEDURE — 71045 X-RAY EXAM CHEST 1 VIEW: CPT

## 2023-12-26 PROCEDURE — 84484 ASSAY OF TROPONIN QUANT: CPT | Performed by: STUDENT IN AN ORGANIZED HEALTH CARE EDUCATION/TRAINING PROGRAM

## 2023-12-26 PROCEDURE — 25010000002 HYDRALAZINE PER 20 MG

## 2023-12-26 PROCEDURE — 96374 THER/PROPH/DIAG INJ IV PUSH: CPT

## 2023-12-26 PROCEDURE — 80053 COMPREHEN METABOLIC PANEL: CPT | Performed by: STUDENT IN AN ORGANIZED HEALTH CARE EDUCATION/TRAINING PROGRAM

## 2023-12-26 PROCEDURE — 85025 COMPLETE CBC W/AUTO DIFF WBC: CPT | Performed by: STUDENT IN AN ORGANIZED HEALTH CARE EDUCATION/TRAINING PROGRAM

## 2023-12-26 RX ORDER — ATORVASTATIN CALCIUM 80 MG/1
80 TABLET, FILM COATED ORAL NIGHTLY
Qty: 90 TABLET | Refills: 0 | Status: SHIPPED | OUTPATIENT
Start: 2023-12-26 | End: 2024-03-25

## 2023-12-26 RX ORDER — LABETALOL HYDROCHLORIDE 5 MG/ML
10 INJECTION, SOLUTION INTRAVENOUS ONCE
Status: COMPLETED | OUTPATIENT
Start: 2023-12-26 | End: 2023-12-26

## 2023-12-26 RX ORDER — SODIUM CHLORIDE 0.9 % (FLUSH) 0.9 %
10 SYRINGE (ML) INJECTION AS NEEDED
Status: DISCONTINUED | OUTPATIENT
Start: 2023-12-26 | End: 2023-12-26 | Stop reason: HOSPADM

## 2023-12-26 RX ORDER — ASPIRIN 325 MG
325 TABLET ORAL ONCE
Status: COMPLETED | OUTPATIENT
Start: 2023-12-26 | End: 2023-12-26

## 2023-12-26 RX ORDER — HYDRALAZINE HYDROCHLORIDE 20 MG/ML
10 INJECTION INTRAMUSCULAR; INTRAVENOUS ONCE
Status: COMPLETED | OUTPATIENT
Start: 2023-12-26 | End: 2023-12-26

## 2023-12-26 RX ORDER — CARVEDILOL 6.25 MG/1
12.5 TABLET ORAL ONCE
Status: COMPLETED | OUTPATIENT
Start: 2023-12-26 | End: 2023-12-26

## 2023-12-26 RX ORDER — CARVEDILOL 12.5 MG/1
12.5 TABLET ORAL 2 TIMES DAILY WITH MEALS
Qty: 60 TABLET | Refills: 0 | Status: SHIPPED | OUTPATIENT
Start: 2023-12-26

## 2023-12-26 RX ADMIN — CARVEDILOL 12.5 MG: 6.25 TABLET, FILM COATED ORAL at 21:22

## 2023-12-26 RX ADMIN — LABETALOL HYDROCHLORIDE 10 MG: 5 INJECTION, SOLUTION INTRAVENOUS at 19:04

## 2023-12-26 RX ADMIN — HYDRALAZINE HYDROCHLORIDE 10 MG: 20 INJECTION INTRAMUSCULAR; INTRAVENOUS at 20:28

## 2023-12-26 RX ADMIN — ASPIRIN 325 MG: 325 TABLET ORAL at 17:43

## 2023-12-26 NOTE — OUTREACH NOTE
Call Center TCM Note      Flowsheet Row Responses   [facility] patient discharged from? [facility]   Does the patient have one of the following disease processes/diagnoses(primary or secondary)? Acute MI (STEMI,NSTEMI)   TCM attempt successful? Yes   Call start time 1632   Call end time 1641   Discharge diagnosis NSTEMI, heart cath & stents   Person spoke with today (if not patient) and relationship Patient   Meds reviewed with patient/caregiver? Yes   Is the patient having any side effects they believe may be caused by any medication additions or changes? No   Does the patient have all prescriptions related to this admission filled (includes statins,anticoagulants,HTN meds,anti-arrhythmia meds) No  [pt did not receive Lipitor or Nicotine CQ from [facility]]   What is keeping the patient from filling the prescriptions? --  [pt is going to call [facility] to inquire about the missing rxs.]   Nursing Interventions No intervention needed   Is the patient taking all medications as directed (includes completed medication regime)? Yes   Comments PCP hospital f/u appt on 1/2/24 at 10am with Dr. Marlen Navarrete.   Does the patient have an appointment with their PCP within 7-14 days of discharge? Yes   Has home health visited the patient within 72 hours of discharge? N/A   Psychosocial issues? No   Comments Pt states she has been having chest pains with each heart beat and pain in left arm. Advised pt to seek medical attention at ED now. Pt states she will go to the ED.   Did the patient receive a copy of their discharge instructions? Yes   Nursing interventions Reviewed instructions with patient   What is the patient's perception of their health status since discharge? Worsening   Nursing interventions Nurse provided patient education   Is the patient/caregiver able to teach back signs and symptoms of when to call for help immediately: Sudden chest discomfort, Sudden discomfort in arms, back, neck or jaw, Shortness  of breath at any time   Nursing interventions Nurse provided patient education   Is the patient/caregiver able to teach back the hierarchy of who to call/visit for symptoms/problems? PCP, Specialist, Home health nurse, Urgent Care, ED, 911 Yes   Additional teach back comments Call was brief due to pts symptoms and needing a medical evaluation. Pt unsure if pain is from the surgery or new symptoms of something.   TCM call completed? Yes   Call end time 1641   Would this patient benefit from a Referral to Amb Social Work? No   Is the patient interested in additional calls from an ambulatory ? No            Shelia Granger RN    12/26/2023, 16:41 EST

## 2023-12-26 NOTE — OUTREACH NOTE
Call Center TCM Note      Flowsheet Row Responses   [facility] [place] patient discharged from? [facility]   Does the patient have one of the following disease processes/diagnoses(primary or secondary)? Acute MI (STEMI,NSTEMI)   TCM attempt successful? No   Unsuccessful attempts Attempt 1  [no [facility] verbal release on file]   Call Status Left message            Shelia Granger RN    12/26/2023, 13:38 EST

## 2023-12-26 NOTE — ED PROVIDER NOTES
Subjective:  History of Present Illness:    Patient is a 43-year-old female here today for chest pain.  She was admitted to the hospital on Friday and Saturday for a similar episode of chest pain that resulted in the diagnosis of a spontaneous coronary artery dissection and she received 4 stents.  She reports that there was difficulty in managing her blood pressure and she required multiple injections before it was able to be lowered.  She was ultimately discharged home on Plavix, aspirin, metoprolol, and Lipitor.  She reports that she was doing okay and then started experiencing episodes of chest pain today similar to what occurred on Friday.  They last anywhere between 10 to 30 minutes.  The pain in the left side of her chest is more stabbing and a discomfort, and she is having an aching sensation in her left arm.  She was also concerned that her blood pressure was high at home.  Received a call today from the hospital checking on her and when she informed them of her pain she was then advised to come to the hospital for an evaluation.      Nurses Notes reviewed and agree, including vitals, allergies, social history and prior medical history.     REVIEW OF SYSTEMS: All systems reviewed and not pertinent unless noted.  Review of Systems    Past Medical History:   Diagnosis Date    ADHD (attention deficit hyperactivity disorder) 10/2002    Allergic 03/2002    Had prick test done several years ago. Didn't explain sympto    Anemia     Anxiety 10/2006    I have a stressful job    Depression 06/2018    Diverticulitis 2023    Diverticulitis of colon 04/25/2023    Diverticulosis 04/25/2023    Diagnosed at [facility].    Exposure to sexually transmitted disease (STD)     Headache     History of anemia     History of body piercing     History of ECG 08/26/2016    History of hypertension     History of kidney infection     History of migraine     Hyperlipidemia     Hypertension 2006    no medication required    Low  back pain 2016    MRI showed bulging discs at L4 & L5. 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    Urinary tract infection     Varicella     Visual impairment 2002    I wear glasses       Allergies:    Augmentin [amoxicillin-pot clavulanate] and Bactrim [sulfamethoxazole-trimethoprim]      Past Surgical History:   Procedure Laterality Date    BREAST AUGMENTATION  2016    BREAST SURGERY  2016    Breast augmentation    CARDIAC CATHETERIZATION N/A 2023    Procedure: Coronary angiography;  Surgeon: Jacinto Daly MD;  Location: [facility] CATH INVASIVE LOCATION;  Service: Cardiology;  Laterality: N/A;     SECTION      COSMETIC SURGERY  2016    Breast augmentation    FOREARM FRACTURE SURGERY      SPINE SURGERY  2020    Discectomy L5/S1    TUBAL COAGULATION LAPAROSCOPIC N/A 2023    Procedure: TUBAL FALLOPE FILSHIE CLIPPING LAPAROSCOPIC, hysteroscopy, dilatation curettage, removal of ParaGard IUD,cerene ablation;  Surgeon: Steven Avila MD;  Location: [facility] OR;  Service: Obstetrics/Gynecology;  Laterality: N/A;    UMBILICAL HERNIA REPAIR      with mesh    WISDOM TOOTH EXTRACTION           Social History     Socioeconomic History    Marital status:    Tobacco Use    Smoking status: Every Day     Types: Electronic Cigarette     Passive exposure: Past    Smokeless tobacco: Never    Tobacco comments:     Not sure when i quit exactly. I never felt particularly addicted so i just quit.   Vaping Use    Vaping Use: Every day    Substances: Nicotine   Substance and Sexual Activity    Alcohol use: Yes     Alcohol/week: 1.0 standard drink of alcohol     Types: 1 Shots of liquor per week     Comment:  "Occasional alcohol use    Drug use: No    Sexual activity: Not Currently     Partners: Male     Birth control/protection: I.U.D.     Comment: I'm interested in getting my tubes tied         Family History   Problem Relation Age of Onset    Hyperlipidemia Mother     Obesity Mother     Osteoporosis Mother     Arthritis Mother         Arthritis in foot    Diabetes Mother         She is currently pre diabetic    Stroke Father     Arthritis Father     Hyperlipidemia Father     Hypertension Father     Mental illness Father     Migraines Father     Obesity Father     Cancer Father         Squamous cell carcinoma    Drug abuse Father         Lifetime marijuana use    Breast cancer Paternal Grandmother     Arthritis Paternal Uncle         Rheumatoid arthritis    Arthritis Other        Objective  Physical Exam:  BP (!) 146/109   Pulse 91   Temp 98.8 °F (37.1 °C) (Oral)   Resp 20   Ht 175.3 cm (69\")   Wt 81.6 kg (180 lb)   SpO2 99%   BMI 26.58 kg/m²      Physical Exam  Vitals and nursing note reviewed.   Constitutional:       Appearance: Normal appearance. She is normal weight.   HENT:      Head: Normocephalic and atraumatic.   Cardiovascular:      Rate and Rhythm: Normal rate and regular rhythm.      Pulses: Normal pulses.      Heart sounds: Normal heart sounds.   Pulmonary:      Effort: Pulmonary effort is normal.      Breath sounds: Normal breath sounds.   Abdominal:      General: Abdomen is flat. Bowel sounds are normal. There is no distension.      Palpations: Abdomen is soft.      Tenderness: There is no abdominal tenderness.   Musculoskeletal:      Right lower leg: No edema.      Left lower leg: No edema.   Skin:     General: Skin is warm and dry.      Capillary Refill: Capillary refill takes less than 2 seconds.   Neurological:      General: No focal deficit present.      Mental Status: She is alert and oriented to person, place, and time.   Psychiatric:         Mood and Affect: Mood normal.         Behavior: " Behavior normal.         Procedures    ED Course:    ED Course as of 12/29/23 1532   Tue Dec 26, 2023   1722 EKG interpreted by me, normal sinus rhythm no concerning ST changes noted, rate of 79 [JE]   2030 EKG interpreted by me reveals sinus rhythm with a rate of 80 bpm.  There are some subtle depressions.  This is an abnormal appearing EKG.  Rate of 80 bpm. [TB]      ED Course User Index  [JE] Armin Moser MD  [TB] Aileen Luther MD       Lab Results (last 24 hours)       ** No results found for the last 24 hours. **             No radiology results from the last 24 hrs       MDM     Amount and/or Complexity of Data Reviewed  Clinical lab tests: reviewed  Tests in the medicine section of CPT®: reviewed  Decide to obtain previous medical records or to obtain history from someone other than the patient: yes        Initial impression of presenting illness: Patient is a 43-year-old female here today for chest pain and hypertension.    DDX: includes but is not limited to: ACS, cardiac arrhythmia, hypertension, and others    Patient arrives hypertensive, hemodynamically stable with vitals interpreted by myself.     Pertinent features from physical exam: Overall benign exam.    Initial diagnostic plan: CBC, CMP, troponin, EKG, chest x-ray    Results from initial plan were reviewed and interpreted by me revealing CBC and CMP are nonactionable.  No acute process noted on chest x-ray.  Initial troponin of 740.    Diagnostic information from other sources: Medical record    Interventions / Re-evaluation: Patient given aspirin per chest pain protocol.  Also provided labetalol and hydralazine for blood pressure control.    Results/clinical rationale were discussed with attending physician and recommended consulting the cardiologist on the patient's recent cardiac event.  Patient was advised of the plan of care and is in agreement.    Consultations/Discussion of results with other physicians: I spoke with   Cook about the patient in regards to her hypertension and elevated troponin with her recent procedure.  He suspects that the troponin is still elevated more so from pericarditis from her recent procedure and that there is not any acute complication with the stent.  He then recommended treating her blood pressure with carvedilol instead of metoprolol.  Changes were made to her medication regimen and a new prescription was sent.  He also advised having her follow-up in the clinic within the next 1 to 2 weeks.    Disposition plan: Patient discharged home in stable condition.  -----        Final diagnoses:   Chest pain, unspecified type          Main Murray, APRN  12/29/23 153

## 2023-12-26 NOTE — Clinical Note
[facility] EMERGENCY DEPARTMENT  [address] [address]  Phone: [phone]    Leigh Negron was seen and treated in our emergency department on 12/26/2023.  She may return to work on 12/28/2023.         Thank you for choosing [facility].    Aileen Luther MD

## 2023-12-26 NOTE — PAYOR COMM NOTE
"To:  Mariano  From: Sonal Knight RN  Phone: [phone]  Fax: [phone]  NPI: [id number]  TIN: [id number]  Member ID: [id number]   MRN: [id number]    Leigh Negron (43 y.o. Female)       Date of Birth   1980    Social Security Number       Address   [address] [place] [address] [address] [place] [address]    Home Phone   [phone]    MRN   [id number]       [employer]   None    Marital Status                               Admission Date   12/22/23    Admission Type   Emergency    Admitting Provider   Cong Melendez MD    Attending Provider       Department, Room/Bed   [facility] TELEMETRY 3, 307/1       Discharge Date   12/24/2023    Discharge Disposition   Home or Self Care    Discharge Destination                                 Attending Provider: (none)   Allergies: Augmentin [Amoxicillin-pot Clavulanate], Bactrim [Sulfamethoxazole-trimethoprim]    Isolation: None   Infection: COVID (confirmed) (12/22/23)   Code Status: Prior    Ht: 175.3 cm (69.02\")   Wt: 81 kg (178 lb 9.2 oz)    Admission Cmt: None   Principal Problem: NSTEMI, initial episode of care [I21.4]                   Active Insurance as of 12/22/2023       Primary Coverage       Payor Plan Insurance Group Employer/Plan Group    MARIAN[employer] BLUE CROSS [employer] BLUE [facility] PPO [id number]       Payor Plan Address Payor Plan Phone Number Payor Plan Fax Number Effective Dates    [address] [phone]  7/11/2022 - None Entered    [place] [address]         Subscriber Name Subscriber Birth Date Member ID       LEIGH NEGRON 1980 [id number]                     Emergency Contacts        (Rel.) Home Phone Work Phone Mobile Phone    angelo briggs (Significant Other) -- -- [phone]    John Baezaa (Mother) [phone] -- [phone]    NegronBeau quinn (Friend) [phone] -- [phone]                 History & Physical        Cong Melendez MD at 12/22/23 2022            [facility] " "HOSPITALIST   HISTORY AND PHYSICAL      Name:  Leigh Negron   Age:  43 y.o.  Sex:  female  :  1980  MRN:  [id number]   Visit Number:  [id number]  Admission Date:  2023  Date Of Service:  23  Primary Care Physician:  Marlen Navarrete MD    Chief Complaint:     Burning sensation in the chest.    History Of Presenting Illness:      Ms. Jara is a 43-year-old female with history of tobacco abuse, ADHD was brought to the emergency room by family member with symptoms of burning sensation of the chest, body aches for the last 1 day.  Apparently her daughter tested positive for COVID 6 weeks ago and patient herself thought she had COVID at that time as she felt bad for several days afterwards.  She has been doing some drywall work at her home for the last several days and she also vapes.  She denies using any new vapes recently.  She states that she is having severe burning sensation from the lower neck to the epigastric region radiating to her neck and left arm.  This was not associated with any shortness of breath.  She denies any prior history of heart disease and states that she has normal effort tolerance.  No family history of heart disease.  She denies recreational drugs but occasionally uses \"edibles\".    In the emergency room, she was afebrile but initial blood pressure was elevated in the 180s.  Pulse oxygen saturation was 100% on room air.  Blood work including CMP and CBC was fairly unremarkable except for potassium of 3.3.  Patient's troponin however was 246 with a repeat at 236.  EKG showed inferior wall ST depressions.  C-reactive protein was within normal limits.  COVID-19 test came back positive but the flu test was negative.  Chest x-ray was unremarkable.  She subsequently had CT angiogram of the chest which was negative for pulmonary embolism or infiltrates but showed small right thyroid nodule, recommend correlation with ultrasound.  Patient's condition was discussed with " Dr. Daly from cardiology who felt that the patient may have viral induced pericarditis and recommended admission for observation.    Review Of Systems:    All systems were reviewed and negative except as mentioned in history of presenting illness, assessment and plan.    Past Medical History: Patient  has a past medical history of ADHD (attention deficit hyperactivity disorder) (10/2002), Allergic (2002), Anemia, Anxiety (10/2006), Depression (2018), Diverticulitis (), Diverticulitis of colon (2023), Diverticulosis (2023), Exposure to sexually transmitted disease (STD), Headache, History of anemia, History of body piercing, History of ECG (2016), History of hypertension, History of kidney infection, History of migraine, Hyperlipidemia, Hypertension (), Low back pain (2016), Migraine (10/05/2023), Obesity (2019), PMS (premenstrual syndrome), PONV (postoperative nausea and vomiting) (), Prehypertension, Recurrent genital herpes, Scoliosis (2008), Urinary tract infection (), Varicella (), and Visual impairment (2002).    Past Surgical History: Patient  has a past surgical history that includes  section; Herod tooth extraction; Forearm fracture surgery; Umbilical hernia repair; Breast surgery (2016); Cosmetic surgery (2016); Spine surgery (2020); Breast Augmentation (2016); and Tubal Sterilization (N/A, 2023).    Social History: Patient  reports that she has been smoking electronic cigarette. She has been exposed to tobacco smoke. She has never used smokeless tobacco. She reports current alcohol use of about 1.0 standard drink of alcohol per week. She reports that she does not use drugs.    Family History:  Patient family history includes Arthritis in her father, mother, paternal uncle, and another family member; Breast cancer in her paternal grandmother; Cancer in her father; Diabetes in her mother; Drug abuse in her father;  Hyperlipidemia in her father and mother; Hypertension in her father; Mental illness in her father; Migraines in her father; Obesity in her father and mother; Osteoporosis in her mother; Stroke in her father.     Allergies:      Augmentin [amoxicillin-pot clavulanate] and Bactrim [sulfamethoxazole-trimethoprim]    Home Medications:    Prior to Admission Medications       Prescriptions Last Dose Informant Patient Reported? Taking?    acetaminophen (Tylenol 8 Hour) 650 MG 8 hr tablet   No No    Take 1 tablet by mouth Every 8 (Eight) Hours As Needed for Mild Pain .    amphetamine-dextroamphetamine (ADDERALL) 10 MG tablet   Yes No    Take 1 tablet by mouth Daily. Takes daily when not taking Vyvanse.    cyclobenzaprine (FLEXERIL) 5 MG tablet   No No    Take 1 tablet by mouth At Night As Needed for Muscle Spasms.    dicyclomine (BENTYL) 20 MG tablet   No No    Take 1 tablet by mouth Every 6 (Six) Hours As Needed (abdominal cramps).    fluticasone (FLONASE) 50 MCG/ACT nasal spray   Yes No    ibuprofen (ADVIL,MOTRIN) 600 MG tablet   No No    Take 1 tablet by mouth Every 6 (Six) Hours As Needed for Mild Pain or Moderate Pain.    lisdexamfetamine (VYVANSE) 50 MG capsule   Yes No    Take 1 capsule by mouth Daily Takes daily on the days she works.    oxyCODONE-acetaminophen (PERCOCET) 5-325 MG per tablet   No No    Take 1 tablet by mouth Every 6 (Six) Hours As Needed for Severe Pain    valACYclovir (VALTREX) 500 MG tablet   No No    Take 1 tablet by mouth Daily.    Patient taking differently:  Take 1 tablet by mouth As Needed.     ED Medications:    Medications   sodium chloride 0.9 % flush 10 mL (has no administration in time range)   aspirin chewable tablet 324 mg (324 mg Oral Given 12/22/23 1856)   iopamidol (ISOVUE-300) 61 % injection 100 mL (100 mL Intravenous Given 12/22/23 1925)     Vital Signs:  Temp:  [97.9 °F (36.6 °C)] 97.9 °F (36.6 °C)  Heart Rate:  [82] 82  Resp:  [16] 16  BP: (186)/(126) 186/126         "12/22/23  1702   Weight: 80.5 kg (177 lb 6.4 oz)     Body mass index is 26.2 kg/m².    Physical Exam:     Most recent vital Signs: BP (!) 186/126 (BP Location: Left arm, Patient Position: Sitting)   Pulse 82   Temp 97.9 °F (36.6 °C) (Oral)   Resp 16   Ht 175.3 cm (69\")   Wt 80.5 kg (177 lb 6.4 oz)   SpO2 100%   BMI 26.20 kg/m²     Physical Exam  Constitutional:       General: She is not in acute distress.     Appearance: Normal appearance. She is not ill-appearing.   HENT:      Head: Normocephalic and atraumatic.      Right Ear: External ear normal.      Left Ear: External ear normal.      Nose: Nose normal.      Mouth/Throat:      Mouth: Mucous membranes are moist.   Eyes:      Extraocular Movements: Extraocular movements intact.      Conjunctiva/sclera: Conjunctivae normal.   Cardiovascular:      Rate and Rhythm: Normal rate and regular rhythm.      Pulses: Normal pulses.      Heart sounds: No murmur heard.     No friction rub.   Pulmonary:      Effort: Pulmonary effort is normal.      Breath sounds: Normal breath sounds. No wheezing or rales.   Abdominal:      General: Bowel sounds are normal.      Palpations: Abdomen is soft.      Tenderness: There is no abdominal tenderness. There is no guarding or rebound.   Musculoskeletal:         General: Normal range of motion.      Cervical back: Neck supple.      Right lower leg: No edema.      Left lower leg: No edema.   Skin:     General: Skin is warm.      Findings: No erythema or rash.   Neurological:      General: No focal deficit present.      Mental Status: She is alert and oriented to person, place, and time. Mental status is at baseline.   Psychiatric:         Mood and Affect: Mood normal.         Behavior: Behavior normal.       Laboratory data:    I have reviewed the labs done in the emergency room.    Results from last 7 days   Lab Units 12/22/23  1732   SODIUM mmol/L 140   POTASSIUM mmol/L 3.3*   CHLORIDE mmol/L 103   CO2 mmol/L 23.9   BUN mg/dL 8 "   CREATININE mg/dL 0.78   CALCIUM mg/dL 9.2   BILIRUBIN mg/dL 0.3   ALK PHOS U/L 54   ALT (SGPT) U/L 26   AST (SGOT) U/L 41*   GLUCOSE mg/dL 115*     Results from last 7 days   Lab Units 12/22/23  1852   WBC 10*3/mm3 7.67   HEMOGLOBIN g/dL 15.1   HEMATOCRIT % 45.3   PLATELETS 10*3/mm3 256         Results from last 7 days   Lab Units 12/22/23  1852 12/22/23  1732   HSTROP T ng/L 236* 246*     EKG:      EKG done in the emergency room was reviewed by me.  It shows sinus rhythm at 80 bpm.  Normal axis.  ST depression noted in the inferior limb leads and lateral chest leads.  No abnormal Q waves were noted.  No clear IL depression was noted.    Radiology:    CT Angiogram Chest Pulmonary Embolism    Result Date: 12/22/2023  FINAL REPORT TECHNIQUE: null CLINICAL HISTORY: chest pain, elevated troponin. covid positive COMPARISON: null FINDINGS: CT angiogram of the chest. Multiplanar MIPS were obtained. No comparison. Findings: No PE is identified. No dissection of the thoracic aorta is seen. Small right thyroid nodule. No pleural or pericardial effusion. Bilateral breast implants. No consolidation. The lungs are well aerated.     Impression: No PE is identified. Small right thyroid nodule consider correlation with ultrasound. Authenticated and Electronically Signed by Javier Ramos MD on 12/22/2023 08:09:46 PM     Assessment:    Chest pain with some typical features, POA.  Elevated troponins with abnormal EKG, POA.  Suspected viral pericarditis/myocarditis, POA.  Essential hypertension, uncontrolled.  Chronic tobacco dependence.  Mild hypokalemia, POA.    Plan:    Chest pain/elevated troponin/abnormal EKG.  - Exact etiology of the patient's chest pain is uncertain but seems to be typical in nature with elevated troponin levels and EKG changes.  - Differential diagnosis includes coronary artery disease as well as viral induced pericarditis/myocarditis.  - Patient has already received full dose aspirin and we will continue her  on low-dose aspirin therapy.  - Avoid Lovenox at this time.  - Obtain 2D echocardiogram in the morning.  - We will consult Dr. Daly from cardiology for further recommendations.  - Obtain fasting lipids in a.m. and repeat troponin levels.  - Lortab for pain control.  - Obtain urine drug screen.    Essential hypertension.  - Patient does seem to have elevated blood pressures but does not take any medications at home.  - Nitropaste every 6 hours.  - Start Toprol-XL.  - Hydralazine as needed.    Chronic tobacco dependence.  - Nicotine patch.    I have discussed the patient's condition and treatment plan with her fiancé who is at the bedside.  Discussed with nursing staff at the bedside.  We will keep her n.p.o. until evaluated by cardiology in the morning.    Risk Assessment: Moderate  DVT Prophylaxis: SCDs  Code Status: Full  Diet: Cardiac    Advance Care Planning  ACP discussion was held with the patient during this visit. Patient does not have an advance directive, information provided.         Cong Melendez MD  12/22/23  20:22 EST    Dictated utilizing Dragon dictation.    Electronically signed by Cong Melendez MD at 12/22/23 0026       Vital Signs (last day) before discharge       Date/Time Temp Temp src Pulse Resp BP Patient Position SpO2    12/24/23 1134 -- -- 70 -- -- -- --    12/24/23 1105 98 (36.7) Oral -- 16 147/99 Lying 98    12/24/23 0840 -- -- -- -- 144/95 -- --    12/24/23 0740 -- -- 80 -- -- -- --    12/24/23 0712 98.3 (36.8) Oral -- 16 150/99 Lying 98    12/24/23 0319 98.5 (36.9) Oral 72 16 144/95 Lying 98    12/24/23 0005 98.9 (37.2) Oral 109 17 126/80 Lying 98    12/23/23 1907 98.7 (37.1) Oral 94 18 103/72 Lying 98    12/23/23 1839 -- -- 82 -- -- -- --    12/23/23 1838 -- -- 86 -- -- -- --    12/23/23 1837 -- -- 86 -- -- -- --    12/23/23 1836 -- -- 82 -- -- -- --    12/23/23 1835 -- -- 73 -- -- -- --    12/23/23 1834 -- -- 77 -- -- -- --    12/23/23 1833 -- -- 71 -- -- -- --    12/23/23 1832 -- --  73 -- -- -- --    12/23/23 1831 -- -- 78 -- -- -- 99    12/23/23 1830 -- -- 73 -- 122/78 -- 99    12/23/23 1829 -- -- 74 -- -- -- 99    12/23/23 1828 -- -- 77 -- -- -- 99    12/23/23 1827 -- -- 73 -- -- -- 98    12/23/23 1826 -- -- 74 -- -- -- 98    12/23/23 1825 -- -- 81 -- -- -- 98    12/23/23 1824 -- -- 73 -- -- -- 98    12/23/23 1823 -- -- 87 -- -- -- 98    12/23/23 1822 -- -- 77 -- -- -- 98    12/23/23 1821 -- -- -- -- -- -- 99    12/23/23 1820 -- -- 87 -- -- -- 98    12/23/23 1819 -- -- 83 -- -- -- 98    12/23/23 1818 -- -- 81 -- -- -- 98    12/23/23 1817 -- -- 84 -- -- -- 98    12/23/23 1816 -- -- 79 -- -- -- 99    12/23/23 1815 -- -- 79 -- -- -- 98    12/23/23 1814 -- -- 81 -- -- -- 98    12/23/23 1813 -- -- 74 -- -- -- 98    12/23/23 1812 -- -- 76 -- -- -- 99    12/23/23 1811 -- -- 75 -- -- -- 99    12/23/23 1810 -- -- 74 -- -- -- 98    12/23/23 1809 -- -- 74 -- -- -- 98    12/23/23 1808 -- -- 78 -- -- -- 98    12/23/23 1807 -- -- 81 -- -- -- 99    12/23/23 1806 -- -- 85 -- -- -- 100    12/23/23 1805 -- -- 74 -- -- -- 99    12/23/23 1804 -- -- 73 -- -- -- 98    12/23/23 1803 -- -- 69 -- -- -- 98    12/23/23 1802 -- -- 68 -- -- -- 98    12/23/23 1801 -- -- 70 -- -- -- 99    12/23/23 1800 -- -- 71 -- 116/82 -- 98    12/23/23 1759 -- -- 67 -- -- -- 99    12/23/23 1758 -- -- 69 -- -- -- 99    12/23/23 1757 -- -- 73 -- -- -- 99    12/23/23 1756 -- -- 90 -- -- -- 99    12/23/23 1755 -- -- 77 -- -- -- 99    12/23/23 1754 -- -- 77 -- -- -- 98    12/23/23 1753 -- -- 77 -- -- -- 99    12/23/23 1752 -- -- 76 -- -- -- 99    12/23/23 1751 -- -- 79 -- -- -- 99    12/23/23 1750 -- -- 89 -- -- -- 99    12/23/23 1749 -- -- 80 -- -- -- 99    12/23/23 1748 -- -- 90 -- -- -- 99    12/23/23 1747 -- -- 91 -- -- -- 99    12/23/23 1746 -- -- 73 -- -- -- 98    12/23/23 1745 -- -- 80 -- -- -- 98    12/23/23 1744 -- -- 87 -- -- -- 99    12/23/23 1743 -- -- 94 -- -- -- 99    12/23/23 1742 -- -- 90 -- -- -- 98    12/23/23 1741 -- -- 71  -- -- -- 98    12/23/23 1740 -- -- 71 -- -- -- 97    12/23/23 1739 -- -- 71 -- -- -- 98    12/23/23 1738 -- -- 83 -- -- -- 99    12/23/23 1737 -- -- 69 -- -- -- 97    12/23/23 1736 -- -- 67 -- -- -- 98    12/23/23 1735 -- -- 69 -- -- -- 98    12/23/23 1734 -- -- 67 -- -- -- 98    12/23/23 1733 -- -- 70 -- -- -- 97    12/23/23 1732 -- -- -- -- -- -- 98    12/23/23 1731 -- -- 64 -- -- -- 99    12/23/23 1730 -- -- -- -- 118/92 -- 99    12/23/23 1729 -- -- 66 -- -- -- 98    12/23/23 1728 -- -- -- -- -- -- 99    12/23/23 1727 -- -- 69 -- -- -- 98    12/23/23 1726 -- -- 63 -- -- -- 99    12/23/23 1725 -- -- 68 -- -- -- 98    12/23/23 1724 -- -- 67 -- -- -- 97    12/23/23 1723 -- -- 71 -- -- -- 99    12/23/23 1722 -- -- 67 -- -- -- 99    12/23/23 1721 -- -- 65 -- -- -- 99    12/23/23 1720 -- -- 67 -- -- -- 99    12/23/23 1719 -- -- 62 -- -- -- 99    12/23/23 1718 -- -- 64 -- -- -- 99    12/23/23 1717 -- -- 62 -- -- -- 99    12/23/23 1716 -- -- 66 -- -- -- 99    12/23/23 1715 -- -- 62 -- -- -- 100    12/23/23 1714 -- -- 64 -- -- -- 99    12/23/23 1713 -- -- 61 -- -- -- 99    12/23/23 1712 -- -- 64 -- -- -- 99    12/23/23 1711 -- -- 65 -- -- -- 99    12/23/23 1710 -- -- 63 -- -- -- 99    12/23/23 1709 -- -- 64 -- -- -- 99    12/23/23 1708 -- -- 62 -- -- -- 99    12/23/23 1707 -- -- 64 -- -- -- 99    12/23/23 1706 -- -- 68 -- -- -- 99    12/23/23 1705 -- -- 66 -- -- -- 99    12/23/23 1704 -- -- 71 -- -- -- 99    12/23/23 1703 -- -- 65 -- -- -- 100    12/23/23 1702 -- -- 66 -- -- -- 99    12/23/23 1701 -- -- 75 -- -- -- 100    12/23/23 1700 -- -- 83 -- 133/89 -- 100    12/23/23 1500 -- -- -- -- -- Lying --    12/23/23 1445 -- -- 71 -- 103/70 -- 99    12/23/23 1430 -- -- 76 -- 112/89 -- 99    12/23/23 1415 -- -- 67 -- 121/92 -- 98    12/23/23 1400 -- -- 70 -- 129/86 -- 99    12/23/23 13:38:30 -- -- 80 16 136/78 -- 98    12/23/23 12:32:59 -- -- 73 12 167/110 -- 99    12/23/23 12:32:34 -- -- -- -- -- -- 99    12/23/23 1138 -- --  -- -- 153/113 -- --    12/23/23 0743 98.2 (36.8) Oral -- 16 146/103 Lying --    12/23/23 0309 98.3 (36.8) Oral 100 18 140/99 Lying 99          No current facility-administered medications for this encounter.     Current Outpatient Medications   Medication Sig Dispense Refill    aspirin 325 MG EC tablet Take 1 tablet by mouth Daily for 90 days. 90 tablet 0    atorvastatin (LIPITOR) 80 MG tablet Take 1 tablet by mouth Every Night for 90 days. 90 tablet 0    clopidogrel (PLAVIX) 75 MG tablet Take 1 tablet by mouth Daily for 90 days. 90 tablet 0    metoprolol succinate XL (TOPROL-XL) 25 MG 24 hr tablet Take 1 tablet by mouth Daily for 90 days. 30 tablet 2    nicotine (NICODERM CQ) 21 MG/24HR patch Place 1 patch on the skin as directed by provider Daily for 14 days. 14 patch 0    acetaminophen (Tylenol 8 Hour) 650 MG 8 hr tablet Take 1 tablet by mouth Every 8 (Eight) Hours As Needed for Mild Pain . 12 tablet 0    cyclobenzaprine (FLEXERIL) 5 MG tablet Take 1 tablet by mouth At Night As Needed for Muscle Spasms. 30 tablet 0    dicyclomine (BENTYL) 20 MG tablet Take 1 tablet by mouth Every 6 (Six) Hours As Needed (abdominal cramps). 20 tablet 0    fluticasone (FLONASE) 50 MCG/ACT nasal spray       ibuprofen (ADVIL,MOTRIN) 600 MG tablet Take 1 tablet by mouth Every 6 (Six) Hours As Needed for Mild Pain or Moderate Pain. 30 tablet 1    oxyCODONE-acetaminophen (PERCOCET) 5-325 MG per tablet Take 1 tablet by mouth Every 6 (Six) Hours As Needed for Severe Pain 6 tablet 0    valACYclovir (VALTREX) 500 MG tablet Take 1 tablet by mouth Daily. (Patient taking differently: Take 1 tablet by mouth As Needed.) 90 tablet 3     Lab Results (last 24 hours)       Procedure Component Value Units Date/Time    Basic Metabolic Panel [[id number]]  (Abnormal) Collected: 12/24/23 0620    Specimen: Blood Updated: 12/24/23 0737     Glucose 91 mg/dL      BUN 6 mg/dL      Creatinine 0.53 mg/dL      Sodium 140 mmol/L      Potassium 3.8 mmol/L       Chloride 108 mmol/L      CO2 21.9 mmol/L      Calcium 8.4 mg/dL      BUN/Creatinine Ratio 11.3     Anion Gap 10.1 mmol/L      eGFR 118.6 mL/min/1.73     Narrative:      GFR Normal >60  Chronic Kidney Disease <60  Kidney Failure <15      Lipid Panel [[id number]] Collected: 12/24/23 0620    Specimen: Blood Updated: 12/24/23 0737     Total Cholesterol 167 mg/dL      Triglycerides 94 mg/dL      HDL Cholesterol 58 mg/dL      LDL Cholesterol  92 mg/dL      VLDL Cholesterol 17 mg/dL      LDL/HDL Ratio 1.56    Narrative:      Cholesterol Reference Ranges  (U.S. Department of Health and Human Services ATP III Classifications)    Desirable          <200 mg/dL  Borderline High    200-239 mg/dL  High Risk          >240 mg/dL      Triglyceride Reference Ranges  (U.S. Department of Health and Human Services ATP III Classifications)    Normal           <150 mg/dL  Borderline High  150-199 mg/dL  High             200-499 mg/dL  Very High        >500 mg/dL    HDL Reference Ranges  (U.S. Department of Health and Human Services ATP III Classifications)    Low     <40 mg/dl (major risk factor for CHD)  High    >60 mg/dl ('negative' risk factor for CHD)        LDL Reference Ranges  (U.S. Department of Health and Human Services ATP III Classifications)    Optimal          <100 mg/dL  Near Optimal     100-129 mg/dL  Borderline High  130-159 mg/dL  High             160-189 mg/dL  Very High        >189 mg/dL    Hemoglobin A1c [[id number]]  (Normal) Collected: 12/24/23 0620    Specimen: Blood Updated: 12/24/23 0729     Hemoglobin A1C 5.00 %     Narrative:      Hemoglobin A1C Ranges:    Increased Risk for Diabetes  5.7% to 6.4%  Diabetes                     >= 6.5%  Diabetic Goal                < 7.0%    CBC (No Diff) [[id number]]  (Normal) Collected: 12/24/23 0620    Specimen: Blood Updated: 12/24/23 0721     WBC 7.34 10*3/mm3      RBC 4.55 10*6/mm3      Hemoglobin 13.0 g/dL      Hematocrit 40.3 %      MCV 88.6 fL      MCH 28.6 pg      MCHC  32.3 g/dL      RDW 13.1 %      RDW-SD 42.8 fl      MPV 11.6 fL      Platelets 230 10*3/mm3     Potassium [[id number]]  (Normal) Collected: 12/23/23 2120    Specimen: Blood Updated: 12/23/23 2142     Potassium 4.0 mmol/L     POC Activated Clotting Time [[id number]]  (Abnormal) Collected: 12/23/23 1251    Specimen: Blood Updated: 12/23/23 1404     Activated Clotting Time  271.0000 Seconds      Comment: Serial Number: [id number]:  [id number]             Imaging Results (Last 24 Hours)       ** No results found for the last 24 hours. **          Orders (last 24 hrs)        Start     Ordered    12/25/23 0000  aspirin 325 MG EC tablet  Daily         12/24/23 1253    12/25/23 0000  clopidogrel (PLAVIX) 75 MG tablet  Daily         12/24/23 1253    12/25/23 0000  metoprolol succinate XL (TOPROL-XL) 25 MG 24 hr tablet  Every 24 Hours Scheduled         12/24/23 1253    12/24/23 0000  atorvastatin (LIPITOR) 80 MG tablet  Nightly         12/24/23 1253    12/24/23 0000  nicotine (NICODERM CQ) 21 MG/24HR patch  Every 24 Hours         12/24/23 1253    12/24/23 0000  Ambulatory Referral to Cardiology         12/24/23 1253    12/24/23 0000  Ambulatory Referral to Cardiac Rehab         12/24/23 1253    12/23/23 1600  Incentive Spirometry  Every 2 Hours While Awake,   Status:  Canceled       12/23/23 1429    12/23/23 0600  Incentive Spirometry  Every 4 Hours While Awake,   Status:  Canceled       12/22/23 2203    --  SCANNED - TELEMETRY           12/22/23 0000    --  SCANNED - TELEMETRY           12/22/23 0000    --  SCANNED - TELEMETRY           12/22/23 0000                  Physician Progress Notes (last 24 hours)  Notes from 12/25/23 1239 through 12/26/23 1239   No notes of this type exist for this encounter.       Consult Notes (last 24 hours)  Notes from 12/25/23 1239 through 12/26/23 1239   No notes of this type exist for this encounter.       Cardiac Catheterization/Vascular Study    Accession Number: [id number]   Date of Study:  23   Ordering Provider: Jacinto Daly MD   Referring: Beau Zhao MD   Clinical Indications: COVID-19 [U07.1 (ICD-10-CM)], Elevated troponin [R79.89 (ICD-10-CM)]        Performing Physician  Performing Staff   Primary: Jacinto Daly MD    Scrub Person: Tre Hood   Invasive Nurse: Cindy Cates RN   Documenter: Caitie Reynolds RN       Procedures    Coronary angiography      Patient Information    Patient Name  Leigh Negron MRN  [id number] Legal Sex  Female  (Age)  1980 (43 y.o.)     Race Ethnicity Encounter Category   White or  Not  or  Emergency      ISCV PACS Images     Show images for Cardiac Catheterization/Vascular Study   Printable Vessel Diagram    Printable Vessel Diagram     Physicians    [facility] Referring Physician Case Authorizing Physician   Jacinto Daly MD (Primary) Beau Zhao MD Breeding, Larry T, MD     Indications    COVID-19 [U07.1 (ICD-10-CM)]   Elevated troponin [R79.89 (ICD-10-CM)]         Conclusion    SCAD     Recommendations         Optimize medical therapy.        Dual antiplatelet therapy for one year.       High intensity statin therapy.        Aspirin therapy indefinitely.     Procedure Narrative    Recommendations:  Enteric-coated aspirin indefinitely  Uninterrupted dual antiplatelet therapy for minimum of 12 months  High intensity statin based cholesterol-lowering therapy  Discontinuation of all amphetamine-based products/prescriptions (amphetamine-based and/or stimulant based pharmaceuticals are now considered strictly contraindicated)  Discontinuation of all nicotine products  Discontinuation of marijuana based products  Outpatient referral for cardiac rehab  Echocardiogram to evaluate LV systolic function post-ACS    Impression:  Non-ST elevation myocardial infarction due to SCAD; almost certainly due to prescription amphetamine use  SCAD involving the large OM1  Extreme mid to  distal LAD tortuosity suggest previous SCAD event(s)  Successful(but disappointing results) PCI of infarct-related artery    Indication:  Non-ST elevation myocardial infarction      ----------------------------------------------------------------------------------------------------------------------    Clinical History: This is a 43-year-old female patient who presented to the emergency room with a 36-hour history of chest discomfort described as a retrosternal burning discomfort.  Twelve-lead electrocardiogram showed no ischemic ST-T wave changes or injury current.  Cardiac troponins however were elevated and trended upward.  The patient had been diagnosed with COVID 6 weeks earlier and there was concern for possible COVID associated myocarditis.  In addition the patient was taking 2 prescription forms of amphetamine (Adderall and Vyvanse).  There was also concern that her presentation could have been related to amphetamine use.    Procedures performed:  Bilateral selective coronary angiography  Drug-eluting stent placement x 4-LCx  OCT-LCx     Access:   5\6 Fijian Slender arterial hemostasis sheath placed via right radial artery; arterial sheath removed in the cardiac catheterization laboratory with application of a transradial band for patent hemostasis.      Procedure narrative:  The procedure was explained in detail to the patient, including risks benefits and alternatives.  The patient expressed understanding and a desire to proceed. Albert's testing demonstrated excellent collateral circulation to both hands.  The patient was brought to the cardiac catheterization laboratory where the right wrist was prepped and draped in usual sterile fashion.  One percent lidocaine was infiltrated into the skin overlying the right radial artery.  Access was obtained from the right radial artery utilizing the micropuncture technique and a 5\6 Fijian Slender hemostasis sheath was placed without difficulty.  The standard  antispasm cocktail as well as 4000 units of unfractionated heparin was administered.  Retrograde catheterization was performed using a 6 Surinamese JR4 diagnostic catheter to engage  the right coronary artery and a 6 Surinamese Tiger diagnostic catheter to engage the left main coronary artery.  Hand injected angiograms was performed.  Contrast ventriculogram was not performed.  For the details of the interventional procedure, refer to the dictation below.     Estimated Blood Loss:  Negligible  Total Contrast:  212 mL  Fluoro Time:  5.2 minutes  Radiation Dose:  707 mGy (air kerma)    Angiographic Findings:  Coronary circulation is right dominant  Left main: The left main coronary artery trifurcates into the left anterior descending, ramus intermedius and circumflex coronary arteries.  The left main coronary artery has no significant disease.  LAD: The left anterior descending gives rise to the diagonal branches as well as multiple septal perforators before terminating as an apical recurrent branch.  The left anterior descending has prominent/conspicuous tortuosity in the mid and distal portion of the vessel.  No focal stenosis is identified.  LCX: The circumflex coronary artery is a large-caliber nondominant vessel giving rise to the obtuse marginal branches.  Ramus intermedius branch has no significant disease.  A large first obtuse marginal branch has a long 80-85% mid to distal stenosis.  RCA: The right coronary artery is dominant for the posterior circulation.  The right coronary artery and its branches have no significant disease.    Intervention Summary    Lesion #1   Location:    OM1  Stenosis pre-PCI:   85%  Stenosis post-PCI:  0%  STEPHANIE flow pre-PCI:  3  STEPHANIE flow post-PCI:  3  ACC AHA class lesion: C    OCT    Proximal Edge Dissection: No    Distal Edge Dissection: No    Acceptable Stent Apposition (<3 mm length, >0.3 mm from wall): Yes    Acceptable Stent Expansion Index (>0.8): Yes    Goal MSA (>4.5 mm^2):  "Yes    Guide:   6 Azeri CLS 3.5  Anticoagulation:  Heparin    The patient was given an additional 3000 units of unfractionated heparin yielding an ACT of 271.  After reengaging the left coronary artery with the guiding catheter, the stenosis in the obtuse marginal branch was crossed with a run-through guidewire.  Primary stenting was performed utilizing a 2.75 x 38 mm Xience drug-eluting stent.  More proximally placed overlapping stents were placed including a 3.5 x 18 mm Xience, 3.5 x 8 mm Xience and 3.5 x 12 mm Xience. OCT was performed.  The angiographic and OCT results at the conclusion of the procedure were acceptable.  After placing the first drug-eluting stent, my suspicion for SCAD was further heightened due to a haziness on the \"trailing\" edge of the stent. The next overlapping drug-eluting stent yielded a similar result.  After the third overlapping drug-eluting stent, there was a clear \"flap\" visible in the dye column.  At this point it became obvious that I was chasing and ever more proximally propagating intimal flap as the result of SCAD physiology.  Surprisingly, OCT did not identify an intimal flap.    Complications: None apparent    Risks, benefits and alternatives were discussed with the patient and/or family. Plan is for minimal sedation. Under my direct supervision, intravenous minimal sedation sedation was administered during the course of this procedure with continuous monitoring of hemodynamic parameters and level of consciousness by an independent trained observer. Less than 20 mL of estimated blood loss during the case. No specimen was collected during the case.     Time Under Physician Observation    Name Panel Role Time Period   Jacinto Daly MD Panel 1 Primary 12/23/2023 1227    Total Sedation Time    Total estim     "

## 2023-12-27 NOTE — DISCHARGE INSTRUCTIONS
After discussing your lab results with the on-call cardiologist, Dr. Perla, he recommends that you discontinue the metoprolol and instead start the carvedilol.  You received your first dose in the ER and there is a prescription sent to your pharmacy for you to start tomorrow to take as directed.  The Lipitor has also been sent to your pharmacy.  Continue your aspirin and clopidogrel.  You can take your cyclobenzaprine as needed.  Would recommend Tylenol for headaches, would withhold ibuprofen at this time.  Your information has been sent to Dr. Perla and somebody from his office will notify you to schedule a sooner appointment.

## 2023-12-29 ENCOUNTER — TELEPHONE (OUTPATIENT)
Dept: CARDIOLOGY | Facility: CLINIC | Age: 43
End: 2023-12-29

## 2023-12-29 NOTE — TELEPHONE ENCOUNTER
Caller: Leigh Negron    Relationship to patient: Self    Best call back number: [phone]    Chief complaint: PATIENT WAS ADVISED TO FOLLOW UP WITH  AFTER HOSPITAL VISIT FOR SCAD. SAYS SHE SAW  IN THE HOSPITAL BUT WOULD PREFER TO FOLLOW UP SOONER WITH  SPECIFICALLY.     Requested date: ASAP      If rescheduling, when is the original appointment: 1.15.24

## 2023-12-29 NOTE — TELEPHONE ENCOUNTER
Spoke with patient. She was scheduled for an appointment with STEFFANIE Lam for 1/15/24 but wanted to be seen sooner. Pt has been r/s for first available 1/10/24.

## 2023-12-31 NOTE — PAYOR COMM NOTE
"To:  Mariano  From: Sonal Knight RN  Phone: [phone]  Fax: [phone]  NPI: [id number]  TIN: [id number]  Member ID: [id number]   MRN: [id number]    Leigh Negron (43 y.o. Female)       Date of Birth   1980    Social Security Number       Address   [address] [place] [address] [address] [place] [address]    Home Phone   [phone]    MRN   [id number]       Gnosticism   None    Marital Status                               Admission Date   12/22/23    Admission Type   Emergency    Admitting Provider   Cong Melendez MD    Attending Provider       Department, Room/Bed   [facility] TELEMETRY 3, 307/1       Discharge Date   12/24/2023    Discharge Disposition   Home or Self Care    Discharge Destination                                 Attending Provider: (none)   Allergies: Augmentin [Amoxicillin-pot Clavulanate], Bactrim [Sulfamethoxazole-trimethoprim]    Isolation: None   Infection: COVID (confirmed) (12/22/23)   Code Status: Prior    Ht: 175.3 cm (69.02\")   Wt: 81 kg (178 lb 9.2 oz)    Admission Cmt: None   Principal Problem: NSTEMI, initial episode of care [I21.4]                   Active Insurance as of 12/22/2023       Primary Coverage       Payor Plan Insurance Group Employer/Plan Group    MARIANO BLUE CROSS ANTH BLUE CROSS BLUE [facility] PPO [id number]       Payor Plan Address Payor Plan Phone Number Payor Plan Fax Number Effective Dates    [address] [phone]  7/11/2022 - None Entered    [place] [address]         Subscriber Name Subscriber Birth Date Member ID       LEIGH NEGRON 1980 [id number]                     Emergency Contacts        (Rel.) Home Phone Work Phone Mobile Phone    angelo briggs (Significant Other) -- -- [phone]    John Baezaa (Mother) [phone] -- [phone]    NegronBeau quinn (Friend) [phone] -- [phone]                 Discharge Summary        Jose Licea MD at 12/24/23 1257              [facility] " HOSPITALIST   DISCHARGE SUMMARY      Name:  Leigh Negron   Age:  43 y.o.  Sex:  female  :  1980  MRN:  [id number]   Visit Number:  [id number]    Admission Date:  2023  Date of Discharge:  2023  Primary Care Physician:  Marlen Navarrete MD    Important issues to note:    -Continue dual antiplatelets with aspirin and Plavix for 12 months, continue with aspirin after that indefinitely.  -Started on metoprolol and Lipitor  -Discontinuation of all amphetamine-based products/prescriptions (amphetamine-based and/or stimulant based pharmaceuticals are now considered strictly contraindicated)  -Discontinuation of all nicotine products  -Outpatient cardiac rehab/follow-up  - Follow-up with PCP for continued care and discuss medication changes    Discharge Diagnoses:     NSTEMI, POA  SCAD involving the large OM1, Status post multiple stents   Chest pain, POA.  Elevated troponins with abnormal EKG, POA.  Suspected viral pericarditis/myocarditis, POA.  Essential hypertension, uncontrolled.  Chronic tobacco dependence.  Mild hypokalemia, POA.    Problem List:     Active Hospital Problems    Diagnosis  POA    **NSTEMI, initial episode of care [I21.4]  Yes    NSTEMI (non-ST elevated myocardial infarction) [I21.4]  Yes    Cytokine release syndrome, grade 1 [D89.831]  No    Elevated troponin [R79.89]  Yes    Precordial pain [R07.2]  Yes    Essential hypertension [I10]  Yes    Current every day vaping [Z72.89]  Not Applicable    COVID-19 [U07.1]  Unknown      Resolved Hospital Problems   No resolved problems to display.     Presenting Problem:    Chief Complaint   Patient presents with    Pain With Breathing      Consults:     Consulting Physician(s)         Provider   Role Jacinto Salinas MD  Consulting Physician Cardiology          Procedures Performed:    Procedure(s):  Coronary angiography    History of presenting illness/Hospital Course:    Ms. Jara is a 43-year-old female with  "history of tobacco abuse, ADHD was brought to the emergency room by family member with symptoms of burning sensation of the chest, body aches for the last 1 day.  Apparently her daughter tested positive for COVID 6 weeks ago and patient herself thought she had COVID at that time as she felt bad for several days afterwards.  She has been doing some drywall work at her home for the last several days and she also vapes.  She denies using any new vapes recently.  She states that she is having severe burning sensation from the lower neck to the epigastric region radiating to her neck and left arm.  This was not associated with any shortness of breath.  She denies any prior history of heart disease and states that she has normal effort tolerance.  No family history of heart disease.  She denies recreational drugs but occasionally uses \"edibles\".     In the emergency room, she was afebrile but initial blood pressure was elevated in the 180s.  Pulse oxygen saturation was 100% on room air.  Blood work including CMP and CBC was fairly unremarkable except for potassium of 3.3.  Patient's troponin however was 246 with a repeat at 236.  EKG showed inferior wall ST depressions.  C-reactive protein was within normal limits.  COVID-19 test came back positive but the flu test was negative.  Chest x-ray was unremarkable.  She subsequently had CT angiogram of the chest which was negative for pulmonary embolism or infiltrates but showed small right thyroid nodule, recommend correlation with ultrasound.  Patient's condition was discussed with Dr. Daly from cardiology who felt that the patient may have viral induced pericarditis and recommended admission for observation.    Chest pain/elevated troponin/abnormal EKG.  -Patient had heart cath on 12/23 with Dr. Daly and was found to have SCAD. Status post multiple stents placed in largest obtuse marginal branch of circumflex. SCAD was almost certainly related to use of 2 different " prescription amphetamine medications.  -The patient has been counseled to avoid all amphetamine-based products and other stimulant medication for ADD.  -2D echo with a EF of 56%.     Essential hypertension.  - Patient does seem to have elevated blood pressures but does not take any medications at home.  - Started Toprol-XL.     Chronic tobacco dependence.  - Nicotine patch.  -Smoking cessation discussed    Right thyroid nodule  -Found incidentally on scan  -Instructed to follow-up with PCP get an ultrasound as an outpatient.    Patient was seen and examined on the day of discharge.  Patient ambulatory in the room with no distress and appears very comfortable.  Patient denies any chest pain or trouble breathing.  She reports feeling significantly better today and is eager to go home.  Patient is otherwise asymptomatic today.  Denies any acute complaints.  Patient cleared by cardiology to be discharged with plan on following up with cardiology in the office/cardiac rehab. Patient instructed on management and plan in details.  Patient counseled on smoking cessation and avoiding any amphetamine/stimulants containing medicines. Patient to follow-up with PCP in 2 to 3 days for recheck and continued care.  I did discuss the thyroid nodule with the patient and instructed her to follow-up with her PCP for an ultrasound.  Clear return precautions discussed.  Patient verbalized understanding and agreeable to plan.  All questions were answered to the patient's satisfaction.      Vital Signs:    Temp:  [98 °F (36.7 °C)-98.9 °F (37.2 °C)] 98 °F (36.7 °C)  Heart Rate:  [] 70  Resp:  [16-18] 16  BP: (103-150)/(70-99) 147/99    Physical Exam:    General Appearance:  Alert and cooperative.  No acute distress.   Head:  Atraumatic and normocephalic.   Eyes: Conjunctivae and sclerae normal, no icterus. No pallor.   Ears:  Ears with no abnormalities noted.   Throat: No oral lesions, no thrush, oral mucosa moist.   Neck: Supple,  trachea midline, no thyromegaly.   Back:   No kyphoscoliosis present. No tenderness to palpation.   Lungs:   Breath sounds heard bilaterally equally.  No crackles or wheezing. No Pleural rub or bronchial breathing.   Heart:  Normal S1 and S2, no murmur, no gallop, no rub. No JVD.   Abdomen:   Normal bowel sounds, no masses, no organomegaly. Soft, nontender, nondistended, no rebound tenderness.   Extremities: Supple, no edema, no cyanosis, no clubbing.   Pulses: Pulses palpable bilaterally.   Skin: No bleeding or rash.   Neurologic: Alert and oriented x 3. No facial asymmetry. Moves all four limbs. No tremors.     Pertinent Lab Results:     Results from last 7 days   Lab Units 12/24/23  0620 12/23/23 2120 12/23/23  0514 12/22/23  1732   SODIUM mmol/L 140  --  139 140   POTASSIUM mmol/L 3.8 4.0 3.0* 3.3*   CHLORIDE mmol/L 108*  --  106 103   CO2 mmol/L 21.9*  --  24.6 23.9   BUN mg/dL 6  --  9 8   CREATININE mg/dL 0.53*  --  0.65 0.78   CALCIUM mg/dL 8.4*  --  9.1 9.2   BILIRUBIN mg/dL  --   --   --  0.3   ALK PHOS U/L  --   --   --  54   ALT (SGPT) U/L  --   --   --  26   AST (SGOT) U/L  --   --   --  41*   GLUCOSE mg/dL 91  --  122* 115*     Results from last 7 days   Lab Units 12/24/23  0620 12/23/23  0514 12/22/23  1852   WBC 10*3/mm3 7.34 8.38 7.67   HEMOGLOBIN g/dL 13.0 13.4 15.1   HEMATOCRIT % 40.3 40.3 45.3   PLATELETS 10*3/mm3 230 224 256         Results from last 7 days   Lab Units 12/23/23  0514 12/22/23  1852 12/22/23  1732   HSTROP T ng/L 757* 236* 246*                           Pertinent Radiology Results:    Imaging Results (All)       Procedure Component Value Units Date/Time    XR Chest 1 View [[id number]] Collected: 12/23/23 0823     Updated: 12/23/23 0825    Narrative:      PROCEDURE: XR CHEST 1 VW-     INDICATION:  chest pain     FINDINGS:  A portable view of the chest was obtained.  Comparison is  made to a prior exam dated 8/25/2016.   The cardiac and mediastinal  silhouettes are within normal  limits.  The lungs are clear.  There is no  pleural effusion or pneumothorax.       Impression:      No acute process on this portable exam.        This report was signed and finalized on 12/23/2023 8:23 AM by Zulay Hammond MD.       CT Angiogram Chest Pulmonary Embolism [[id number]] Collected: 12/22/23 2009     Updated: 12/22/23 2011    Narrative:      FINAL REPORT    TECHNIQUE:  null    CLINICAL HISTORY:  chest pain, elevated troponin. covid positive    COMPARISON:  null    FINDINGS:  CT angiogram of the chest.    Multiplanar MIPS were obtained.    No comparison.    Findings:    No PE is identified.    No dissection of the thoracic aorta is seen.    Small right thyroid nodule.    No pleural or pericardial effusion.    Bilateral breast implants.    No consolidation.    The lungs are well aerated.      Impression:      Impression:    No PE is identified.    Small right thyroid nodule consider correlation with ultrasound.    Authenticated and Electronically Signed by Javier Ramos MD on  12/22/2023 08:09:46 PM            Echo:    Results for orders placed during the hospital encounter of 12/22/23    Adult Transthoracic Echo Complete W/ Cont if Necessary Per Protocol    Interpretation Summary    Left ventricular systolic function is normal. Estimated left ventricular EF = 56% Left ventricular ejection fraction appears to be 56 - 60%.    Left ventricular diastolic function was normal.    Estimated right ventricular systolic pressure from tricuspid regurgitation is normal (<35 mmHg).    Condition on Discharge:      Stable.    Code status during the hospital stay:    Code Status and Medical Interventions:   Ordered at: 12/22/23 2203     Code Status (Patient has no pulse and is not breathing):    CPR (Attempt to Resuscitate)     Medical Interventions (Patient has pulse or is breathing):    Full Support     Discharge Disposition:    Home or Self Care    Discharge Medications:       Discharge Medications        New  Medications        Instructions Start Date   aspirin 325 MG EC tablet   325 mg, Oral, Daily   Start Date: December 25, 2023     atorvastatin 80 MG tablet  Commonly known as: LIPITOR   80 mg, Oral, Nightly      clopidogrel 75 MG tablet  Commonly known as: PLAVIX   75 mg, Oral, Daily   Start Date: December 25, 2023     metoprolol succinate XL 25 MG 24 hr tablet  Commonly known as: TOPROL-XL   25 mg, Oral, Every 24 Hours Scheduled   Start Date: December 25, 2023     nicotine 21 MG/24HR patch  Commonly known as: NICODERM CQ   1 patch, Transdermal, Every 24 Hours             Changes to Medications        Instructions Start Date   valACYclovir 500 MG tablet  Commonly known as: VALTREX  What changed:   when to take this  reasons to take this   500 mg, Oral, Daily             Continue These Medications        Instructions Start Date   acetaminophen 650 MG 8 hr tablet  Commonly known as: Tylenol 8 Hour   650 mg, Oral, Every 8 Hours PRN      cyclobenzaprine 5 MG tablet  Commonly known as: FLEXERIL   5 mg, Oral, Nightly PRN      dicyclomine 20 MG tablet  Commonly known as: BENTYL   20 mg, Oral, Every 6 Hours PRN      fluticasone 50 MCG/ACT nasal spray  Commonly known as: FLONASE   No dose, route, or frequency recorded.      ibuprofen 600 MG tablet  Commonly known as: ADVIL,MOTRIN   600 mg, Oral, Every 6 Hours PRN      oxyCODONE-acetaminophen 5-325 MG per tablet  Commonly known as: PERCOCET   Take 1 tablet by mouth Every 6 (Six) Hours As Needed for Severe Pain             Stop These Medications      amphetamine-dextroamphetamine 10 MG tablet  Commonly known as: ADDERALL     lisdexamfetamine 50 MG capsule  Commonly known as: VYVANSE            Discharge Diet:   Cardiac    Activity at Discharge:   As tolerated    Follow-up Appointments:    Additional Instructions for the Follow-ups that You Need to Schedule       Ambulatory Referral to Cardiac Rehab   As directed             Follow-up Information       Moose Perla MD  Follow up in 1 month(s).    Specialties: Cardiology, Interventional Cardiology  Why: SCAD  Contact information:  [address]  [address]  [facility] [address]  [phone]               Marlen Navarrete MD Follow up in 3 day(s).    Specialties: Family Medicine, Emergency Medicine  Contact information:  [address] [place] [address]  [phone]               [facility] CARDIAC REHAB .    Specialty: Cardiac Rehabilitation  Contact information:  Medical Office [address]  [address] [facility] [address] [phone]                         No future appointments.  Test Results Pending at Discharge:           Jose Licea MD  12/24/23  12:57 EST    Time: I spent 28 minutes on this discharge activity which included: face-to-face encounter with the patient, reviewing the data in the system, coordination of the care with the nursing staff as well as consultants, documentation, and entering orders.     Dictated utilizing Dragon dictation.      Electronically signed by Jose Licea MD at 12/24/23 1589

## 2024-01-02 ENCOUNTER — OFFICE VISIT (OUTPATIENT)
Dept: FAMILY MEDICINE CLINIC | Facility: CLINIC | Age: 44
End: 2024-01-02
Payer: COMMERCIAL

## 2024-01-02 VITALS
OXYGEN SATURATION: 99 % | WEIGHT: 176.2 LBS | TEMPERATURE: 97.2 F | DIASTOLIC BLOOD PRESSURE: 80 MMHG | BODY MASS INDEX: 26.1 KG/M2 | HEART RATE: 79 BPM | SYSTOLIC BLOOD PRESSURE: 126 MMHG | RESPIRATION RATE: 16 BRPM | HEIGHT: 69 IN

## 2024-01-02 DIAGNOSIS — I25.42 SPONTANEOUS DISSECTION OF CORONARY ARTERY: ICD-10-CM

## 2024-01-02 DIAGNOSIS — I10 ESSENTIAL HYPERTENSION: ICD-10-CM

## 2024-01-02 DIAGNOSIS — R94.5 ABNORMAL LIVER FUNCTION: ICD-10-CM

## 2024-01-02 DIAGNOSIS — Z09 HOSPITAL DISCHARGE FOLLOW-UP: Primary | ICD-10-CM

## 2024-01-02 DIAGNOSIS — E04.1 THYROID NODULE: ICD-10-CM

## 2024-01-02 DIAGNOSIS — I25.2 HISTORY OF NON-ST ELEVATION MYOCARDIAL INFARCTION (NSTEMI): ICD-10-CM

## 2024-01-02 PROBLEM — I21.4 NSTEMI (NON-ST ELEVATED MYOCARDIAL INFARCTION): Status: RESOLVED | Noted: 2023-12-24 | Resolved: 2024-01-02

## 2024-01-02 PROBLEM — Z30.2 ENCOUNTER FOR STERILIZATION: Status: RESOLVED | Noted: 2023-11-03 | Resolved: 2024-01-02

## 2024-01-02 PROBLEM — I21.4 NSTEMI, INITIAL EPISODE OF CARE: Status: RESOLVED | Noted: 2023-12-23 | Resolved: 2024-01-02

## 2024-01-02 PROBLEM — N92.1 MENORRHAGIA WITH IRREGULAR CYCLE: Status: RESOLVED | Noted: 2023-11-03 | Resolved: 2024-01-02

## 2024-01-02 PROBLEM — U07.1 COVID-19: Status: RESOLVED | Noted: 2023-12-22 | Resolved: 2024-01-02

## 2024-01-02 PROBLEM — Z86.79 HISTORY OF CAROTID ARTERY DISSECTION: Status: ACTIVE | Noted: 2024-01-02

## 2024-01-02 PROBLEM — R79.89 ELEVATED TROPONIN: Status: RESOLVED | Noted: 2023-12-22 | Resolved: 2024-01-02

## 2024-01-02 PROBLEM — R07.2 PRECORDIAL PAIN: Status: RESOLVED | Noted: 2023-12-22 | Resolved: 2024-01-02

## 2024-01-02 NOTE — PROGRESS NOTES
Transitional Care Follow Up Visit  Subjective     Leigh Negron is a 44 y.o. female who presents for a transitional care management visit.    Within 48 business hours after discharge our office contacted her via telephone to coordinate her care and needs.      I reviewed and discussed the details of that call along with the discharge summary, hospital problems, inpatient lab results, inpatient diagnostic studies, and consultation reports with Leigh.     Current outpatient and discharge medications have been reconciled for the patient.  Reviewed by: Marlen Navarrete MD          12/24/2023     2:11 PM   Date of TCM Phone Call   [facility]   Date of Admission 12/22/2023   Date of Discharge 12/24/2023   Discharge Disposition Home or Self Care     Risk for Readmission (LACE) Score: 3 (12/24/2023  6:00 AM)      History of Present Illness   Course During Hospital Stay:    Admitted to [facility] 12/22/23 with CP, assoc'd with elevated troponin, EKG c/w NSTEMI. Heart cath performed 12/23 which showed SCAD OM1. Mulitple stents placed. Started on ASA, plavix, statin and metoprolol. COVID test +, suspected pericarditis/myocarditis as well. ECHO wnl.     ER visit 2 days after d/c for recurrent CP. Metoprolol switch to coreg.    Incidental thyroid nodule seen on CT chest with PE protocol.    She c/o mild intermittent left sided CP, non exertional. No assoc'd symptoms. Reports taking meds as rx'd. No fever.    Above event felt to be related to ADHD tx with stimulant therapy. Pt has dc'd as instructed.    The following portions of the patient's history were reviewed and updated as appropriate: allergies, current medications, past family history, past medical history, past social history, past surgical history, and problem list.    Review of Systems   Constitutional:  Negative for fatigue, fever and unexpected weight change.   HENT:  Negative for congestion, mouth sores, rhinorrhea, sore throat and trouble swallowing.     Eyes:  Negative for visual disturbance.   Respiratory:  Negative for cough, shortness of breath and wheezing.    Cardiovascular:  Positive for chest pain (as per HPI). Negative for palpitations and leg swelling.   Gastrointestinal:  Negative for abdominal pain, blood in stool, constipation, diarrhea and vomiting.   Endocrine: Negative for polydipsia and polyuria.   Genitourinary:  Negative for dysuria, frequency, hematuria and urgency.   Musculoskeletal:  Positive for arthralgias and myalgias. Negative for back pain.   Skin:  Negative for rash and wound.   Neurological:  Negative for dizziness, tremors, syncope, weakness and headaches.   Hematological:  Negative for adenopathy. Bruises/bleeds easily.   Psychiatric/Behavioral:  Positive for sleep disturbance. Negative for confusion and dysphoric mood. The patient is not nervous/anxious.    Pt's previous ROS reviewed and updated as indicated.       Objective   Vitals:    01/02/24 1030   BP: 126/80   Pulse: 79   Resp: 16   Temp: 97.2 °F (36.2 °C)   SpO2: 99%     Body mass index is 26.02 kg/m².      01/02/24  1030   Weight: 79.9 kg (176 lb 3.2 oz)     Physical Exam  Vitals and nursing note reviewed.   Constitutional:       General: She is not in acute distress.     Appearance: She is well-developed and well-groomed. She is not ill-appearing.   HENT:      Head: Atraumatic.      Mouth/Throat:      Mouth: Mucous membranes are moist.   Eyes:      General: No scleral icterus.     Conjunctiva/sclera: Conjunctivae normal.   Neck:      Thyroid: No thyroid mass.   Cardiovascular:      Rate and Rhythm: Normal rate and regular rhythm.      Pulses: Normal pulses.      Heart sounds: Normal heart sounds.   Pulmonary:      Effort: Pulmonary effort is normal.      Breath sounds: Normal breath sounds.   Abdominal:      General: Bowel sounds are normal. There is no distension.      Palpations: Abdomen is soft. There is no hepatomegaly, splenomegaly or mass.      Tenderness: There is no  abdominal tenderness.   Musculoskeletal:      Right lower leg: No edema.      Left lower leg: No edema.   Lymphadenopathy:      Cervical: No cervical adenopathy.   Skin:     General: Skin is warm and dry.      Coloration: Skin is not jaundiced or pale.      Findings: Bruising (RUE consistent with recent procedure, no hematoma or seroma) present. No rash.   Neurological:      Mental Status: She is alert and oriented to person, place, and time. Mental status is at baseline.      Motor: No tremor.      Gait: Gait is intact.   Psychiatric:         Mood and Affect: Mood and affect normal.         Behavior: Behavior normal. Behavior is cooperative.         Cognition and Memory: Cognition normal.     Pt's previous physical exam reviewed and updated as indicated.    XR Chest 1 View  Result Date: 12/27/2023  Unremarkable portable chest.    This report was signed and finalized on 12/27/2023 8:21 AM by Jake Suh MD.      XR Chest 1 View  Result Date: 12/23/2023  No acute process on this portable exam.   This report was signed and finalized on 12/23/2023 8:23 AM by Zulay Hammond MD.      CT Angiogram Chest Pulmonary Embolism  Result Date: 12/22/2023  Impression: No PE is identified. Small right thyroid nodule consider correlation with ultrasound. Authenticated and Electronically Signed by Javier Ramos MD on 12/22/2023 08:09:46 PM     ECHO RESULTS, CATH REPORT, CONSULT NOTE REVIEWED ON CHART    Lab Results   Component Value Date    WBC 7.40 12/26/2023    HGB 13.9 12/26/2023    HCT 41.5 12/26/2023    MCV 88.5 12/26/2023     12/26/2023       Lab Results   Component Value Date    GLUCOSE 86 12/26/2023    BUN 12 12/26/2023    CREATININE 0.66 12/26/2023    EGFRIFNONA 89 02/09/2022    EGFRIFAFRI 108 02/09/2022    BCR 18.2 12/26/2023    K 3.9 12/26/2023    CO2 22.4 12/26/2023    CALCIUM 9.6 12/26/2023    PROTENTOTREF 6.6 02/09/2022    ALBUMIN 4.3 12/26/2023    LABIL2 2.0 02/09/2022    AST 42 (H) 12/26/2023    ALT 47 (H)  12/26/2023       Lab Results   Component Value Date    CHOL 167 12/24/2023    CHLPL 200 03/16/2022    TRIG 94 12/24/2023    HDL 58 12/24/2023    LDL 92 12/24/2023    LDLDIRECT 134 10/28/2020       Lab Results   Component Value Date    HGBA1C 5.00 12/24/2023       Lab Results   Component Value Date    TSH 1.610 12/23/2023         Assessment & Plan   Diagnoses and all orders for this visit:    1. Hospital discharge follow-up (Primary)    2. History of non-ST elevation myocardial infarction (NSTEMI)    3. Spontaneous dissection of coronary artery    4. Abnormal liver function  -     Comprehensive Metabolic Panel    5. Essential hypertension  -     Comprehensive Metabolic Panel    6. Thyroid nodule  -     US Thyroid; Future      Appears clinically stable 11 days out from NSTEMI related to SCAD. Taking meds as rx'd, tolerating well. Blood pressure improved. Has dc'd stimulant tx as instructed. Advised to f/u with cardiology as scheduled.    BMI is >= 25 and <30. (Overweight) The following options were offered after discussion;: exercise counseling/recommendations and nutrition counseling/recommendations.    F/u per routine, f/u sooner as needed/instructed.  I will contact patient regarding test results and provide instructions regarding any necessary changes in plan of care.  Patient was encouraged to keep me informed of any acute changes, lack of improvement, or any new concerning symptoms.  Pt is aware of reasons to seek emergent care.  Patient voiced understanding of all instructions and denied further questions.    This transition of care hospital follow up visit required high complexity medical decision  making and took placed within 1 business week of discharge.    Please note that portions of this note may have been completed with a voice recognition program.

## 2024-01-03 LAB
ALBUMIN SERPL-MCNC: 4.3 G/DL (ref 3.5–5.2)
ALBUMIN/GLOB SERPL: 2 G/DL
ALP SERPL-CCNC: 62 U/L (ref 39–117)
ALT SERPL-CCNC: 41 U/L (ref 1–33)
AST SERPL-CCNC: 25 U/L (ref 1–32)
BILIRUB SERPL-MCNC: 0.5 MG/DL (ref 0–1.2)
BUN SERPL-MCNC: 8 MG/DL (ref 6–20)
BUN/CREAT SERPL: 11 (ref 7–25)
CALCIUM SERPL-MCNC: 9 MG/DL (ref 8.6–10.5)
CHLORIDE SERPL-SCNC: 105 MMOL/L (ref 98–107)
CO2 SERPL-SCNC: 23.2 MMOL/L (ref 22–29)
CREAT SERPL-MCNC: 0.73 MG/DL (ref 0.57–1)
EGFRCR SERPLBLD CKD-EPI 2021: 104.8 ML/MIN/1.73
GLOBULIN SER CALC-MCNC: 2.2 GM/DL
GLUCOSE SERPL-MCNC: 91 MG/DL (ref 65–99)
POTASSIUM SERPL-SCNC: 4.2 MMOL/L (ref 3.5–5.2)
PROT SERPL-MCNC: 6.5 G/DL (ref 6–8.5)
SODIUM SERPL-SCNC: 140 MMOL/L (ref 136–145)

## 2024-01-05 ENCOUNTER — READMISSION MANAGEMENT (OUTPATIENT)
Dept: CALL CENTER | Facility: HOSPITAL | Age: 44
End: 2024-01-05
Payer: COMMERCIAL

## 2024-01-05 NOTE — OUTREACH NOTE
AMI Week 2 Survey      Flowsheet Row Responses   [facility] patient discharged from? [facility]   Does the patient have one of the following disease processes/diagnoses(primary or secondary)? Acute MI (STEMI,NSTEMI)   Week 2 attempt successful? Yes   Call start time 1556   Call end time 1617   Discharge diagnosis NSTEMI, heart cath & stents   Meds reviewed with patient/caregiver? Yes   Is the patient taking all medications as directed (includes completed medication regime)? Yes   Does the patient have a primary care provider?  Yes   Has the patient kept scheduled appointments due by today? Yes   Comments Pt reports R. radial site is improving.Pt reports intermittent chest pain that occas. is associated w/ arm pain, this pain is not associated with certain activities and resolves on its own. Pt plans to obtain a BP monitor to begin monitoring BP.   What is the patient's perception of their health status since discharge? Improving   Nursing interventions Nurse provided patient education   Is the patient/caregiver able to teach back signs and symptoms of when to call for help immediately: Sudden chest discomfort, Sudden discomfort in arms, back, neck or jaw, Irregular or rapid heart rate, Dizziness or lightheadedness   Is the patient/caregiver able to teach back lifestyle changes to help prevent MIs Heart healthy diet, Quit smoking   Is the patient/caregiver able to teach back ways to prevent a second heart attack: Take medications, Follow up with MD   If the patient is a current smoker, are they able to teach back resources for cessation? --  [Pt is trying to stop smoking and is transitioning to vaping.Pt reports that she is attemting to stop vaping w/nicotine products. Nursed educated pt on risks of nicotine and vaping r/t pt's health, pt v/u.]   Is the patient/caregiver able to teach back the hierarchy of who to call/visit for symptoms/problems? PCP, Specialist, Home health nurse, Urgent Care, ED, 911 Yes   Week  2 call completed? Yes   Call end time [id number]            Isha MARTE - Registered Nurse

## 2024-01-10 ENCOUNTER — OFFICE VISIT (OUTPATIENT)
Dept: CARDIOLOGY | Facility: CLINIC | Age: 44
End: 2024-01-10
Payer: COMMERCIAL

## 2024-01-10 VITALS
OXYGEN SATURATION: 94 % | WEIGHT: 178 LBS | SYSTOLIC BLOOD PRESSURE: 120 MMHG | BODY MASS INDEX: 26.36 KG/M2 | DIASTOLIC BLOOD PRESSURE: 68 MMHG | HEIGHT: 69 IN | HEART RATE: 85 BPM

## 2024-01-10 DIAGNOSIS — Z72.89 CURRENT EVERY DAY VAPING: Chronic | ICD-10-CM

## 2024-01-10 DIAGNOSIS — I25.42 SPONTANEOUS DISSECTION OF CORONARY ARTERY: Primary | ICD-10-CM

## 2024-01-10 DIAGNOSIS — I10 ESSENTIAL HYPERTENSION: Chronic | ICD-10-CM

## 2024-01-10 DIAGNOSIS — I25.2 HISTORY OF NON-ST ELEVATION MYOCARDIAL INFARCTION (NSTEMI): ICD-10-CM

## 2024-01-10 RX ORDER — ESCITALOPRAM OXALATE 5 MG/1
5 TABLET ORAL DAILY
COMMUNITY
Start: 2024-01-08

## 2024-01-10 NOTE — PROGRESS NOTES
[facility] Cardiology OP Consult Note    Leigh Negron  [id number]  01/10/2024    Referred By: Jose Licea MD    Chief Complaint   Patient presents with    Follow-up    nstemi       Subjective     History of Present Illness:   Leigh Negron is a 44 y.o. female with recent diagnosis of NSTEMI secondary to SCAD status post PCI who presents for hospital follow-up.  Patient recently presented to the hospital with a few days of burning chest pain and left arm pain. She was found to have an elevated troponin but suspicion for ACS was quite low.  She underwent coronary angiography and was found to have a large OM1 stenosis.  Intervention was attempted which caused what appeared to be a dissection which was then followed by subsequent stenting.  4 stents were required to treat the lesion.  In retrospect, this was likely the presentation of SCAD.  Echocardiogram in the hospital was normal.  Patient was discharged in stable condition.  She has since stopped all of her stimulant ADD meds.  She has attempted to quit smoking but still smokes a few cigarettes as well as vapes.  Says that she feels very tired and has no energy since her hospitalization.  Has many questions regarding what level of activity is safe to undertake.  Says she has on and off chest pains over the left side of her chest and sometimes in her arm.  She has chronic upper back pain and cannot tell whether this is musculoskeletal or heart related.  She has not had any recurrence of the chest symptoms she had on presentation.  No orthopnea, PND, or leg swelling.    Past Cardiac Testin. Last Coronary Angio: 2023  - SCAD involving the large OM1  - Extreme mid to distal LAD tortuosity suggest previous SCAD  - Successful PCI x4 of LCX   2. Last Echo: 2023    Left ventricular systolic function is normal. Estimated left ventricular EF = 56% Left ventricular ejection fraction appears to be 56 - 60%.  3. Prior Stress  Testing: None  4. Prior Holter Monitor: None    No specialty comments available.    Review of Systems   Constitutional:  Positive for fatigue.   Respiratory: Negative.     Cardiovascular:  Positive for chest pain. Negative for palpitations and leg swelling.   Gastrointestinal: Negative.    Musculoskeletal:  Positive for back pain.   Neurological: Negative.        Past Medical History:   Diagnosis Date    ADHD (attention deficit hyperactivity disorder) 10/2002    Allergic 2002    Had prick test done several years ago. Didn't explain sympto    Anemia     Anxiety 10/2006    I have a stressful job    Coronary artery disease 2023    Heart attack    Depression 2018    Diverticulitis     Diverticulitis of colon 2023    Diverticulosis 2023    Diagnosed at [facility].    Exposure to sexually transmitted disease (STD)     Headache     History of anemia     History of body piercing     History of ECG 2016    History of hypertension     History of kidney infection     History of migraine     Hyperlipidemia     Hypertension     no medication required    Low back pain 2016    MRI showed bulging discs at L4 & L5. Treated by Chris Sipple    Migraine 10/05/2023    NSTEMI, initial episode of care 2023    Obesity 2019    PMS (premenstrual syndrome)     PONV (postoperative nausea and vomiting) 2003    Prehypertension     Recurrent genital herpes     Scoliosis 2008    Muscular-induced scoliosis in upper back.    Urinary tract infection     Varicella 1986    Visual impairment 2002    I wear glasses       Past Surgical History:   Procedure Laterality Date    BREAST AUGMENTATION  2016    BREAST SURGERY  2016    Breast augmentation    CARDIAC CATHETERIZATION N/A 2023    Procedure: Coronary angiography;  Surgeon: Jacinto Daly MD;  Location: [facility] CATH INVASIVE LOCATION;  Service: Cardiology;  Laterality: N/A;     SECTION      COLONOSCOPY  2023     CORONARY STENT PLACEMENT  12/23/2023    COSMETIC SURGERY  06/2016    Breast augmentation    FOREARM FRACTURE SURGERY      SPINE SURGERY  11/2020    Discectomy L5/S1    TUBAL ABDOMINAL LIGATION  11/20/2023    Also ablation    TUBAL COAGULATION LAPAROSCOPIC N/A 11/20/2023    Procedure: TUBAL FALLOPE FILSHIE CLIPPING LAPAROSCOPIC, hysteroscopy, dilatation curettage, removal of ParaGard IUD,cerene ablation;  Surgeon: Steven Avila MD;  Location: [facility];  Service: Obstetrics/Gynecology;  Laterality: N/A;    UMBILICAL HERNIA REPAIR      with mesh    WISDOM TOOTH EXTRACTION         Family History   Problem Relation Age of Onset    Hyperlipidemia Mother     Obesity Mother     Osteoporosis Mother     Arthritis Mother         Arthritis in foot    Diabetes Mother         She is currently pre diabetic    Stroke Father     Arthritis Father     Hyperlipidemia Father     Hypertension Father     Mental illness Father     Migraines Father     Obesity Father     Cancer Father         Squamous cell carcinoma    Drug abuse Father         Lifetime marijuana use    Breast cancer Paternal Grandmother     Arthritis Paternal Uncle         Rheumatoid arthritis    Arthritis Other        Social History     Socioeconomic History    Marital status:    Tobacco Use    Smoking status: Some Days     Types: Electronic Cigarette     Passive exposure: Past    Smokeless tobacco: Never    Tobacco comments:     Not sure when i quit exactly. I never felt particularly addicted so i just quit.   Vaping Use    Vaping Use: Every day    Substances: Nicotine   Substance and Sexual Activity    Alcohol use: Yes     Alcohol/week: 1.0 standard drink of alcohol     Types: 1 Shots of liquor per week     Comment: Occasional alcohol use    Drug use: No    Sexual activity: Yes     Partners: Male     Birth control/protection: Tubal ligation     Comment: I'm interested in getting my tubes tied         Current Outpatient Medications:     aspirin  "325 MG EC tablet, Take 1 tablet by mouth Daily for 90 days., Disp: 90 tablet, Rfl: 0    atorvastatin (LIPITOR) 80 MG tablet, Take 1 tablet by mouth Every Night for 90 days., Disp: 90 tablet, Rfl: 0    carvedilol (Coreg) 12.5 MG tablet, Take 1 tablet by mouth 2 (Two) Times a Day With Meals., Disp: 60 tablet, Rfl: 0    clopidogrel (PLAVIX) 75 MG tablet, Take 1 tablet by mouth Daily for 90 days., Disp: 90 tablet, Rfl: 0    cyclobenzaprine (FLEXERIL) 5 MG tablet, Take 1 tablet by mouth At Night As Needed for Muscle Spasms., Disp: 30 tablet, Rfl: 0    escitalopram (LEXAPRO) 5 MG tablet, Take 1 tablet by mouth Daily., Disp: , Rfl:     fluticasone (FLONASE) 50 MCG/ACT nasal spray, , Disp: , Rfl:     valACYclovir (VALTREX) 500 MG tablet, Take 1 tablet by mouth Daily. (Patient taking differently: Take 1 tablet by mouth As Needed.), Disp: 90 tablet, Rfl: 3    Allergies   Allergen Reactions    Augmentin [Amoxicillin-Pot Clavulanate] Nausea And Vomiting    Bactrim [Sulfamethoxazole-Trimethoprim] GI Intolerance          Objective   Vitals:    01/10/24 1301 01/10/24 1428   BP: 142/100 120/68  Comment: Per Dr Dos Santos   BP Location: Right arm    Patient Position: Sitting    Cuff Size: Adult    Pulse: 85    SpO2: 94%    Weight: 80.7 kg (178 lb)    Height: 175.3 cm (69\")        Vitals reviewed.   Constitutional:       Appearance: Healthy appearance. Not in distress.   Neck:      Vascular: No carotid bruit or JVD.   Pulmonary:      Effort: Pulmonary effort is normal.      Breath sounds: Normal breath sounds.   Cardiovascular:      Normal rate. Regular rhythm. Normal S1. Normal S2.       Murmurs: There is no murmur.      No gallop.  No click. No rub.   Pulses:     Intact distal pulses.   Edema:     Peripheral edema absent.   Abdominal:      General: There is no distension.      Palpations: Abdomen is soft.      Tenderness: There is no abdominal tenderness.   Skin:     General: Skin is warm and dry.         Results Review:   I reviewed " the patient's new clinical results.  I reviewed the patient's other test results and agree with the interpretation  I personally viewed and interpreted the patient's EKG/Telemetry data    Procedures       Assessment / Plan:   Diagnoses and all orders for this visit:    1. Spontaneous dissection of coronary artery (Primary)  2. History of non-ST elevation myocardial infarction (NSTEMI)  SCAD in her OM.  Status post PCI x 4 to that artery.  Extreme mid to distal LAD tortuosity suggest prior silent SCAD events.  Echo shows preserved EF.  Clinically, no signs of heart failure.  Continues to have on and off chest pains which are not typical.  Likely significant musculoskeletal component with chronic back pain.  Also exacerbated by blood pressure spikes.  -- Proceed with DAPT with aspirin and Plavix for 1 year post PCI (through 12/23/2024)  -- Continue beta-blocker and high intensity statin   -- Ordering head to toe CTA to screen for fibromuscular dysplasia  -- Recommended participation in cardiac rehab to slowly increase her activity.  If her fatigue is ongoing, may need to consider alternative beta-blocker versus screening for GARDENIA    3. Essential hypertension  Initially elevated but better on recheck.  Goal less than 130/80.  Will need to maintain very strict blood pressure control with her vascular history.  -- Continue current dose of carvedilol and keep blood pressure log    4. Current every day vaping  Still smokes cigarettes and vapes.  Going to start patches soon.  Encouraged her to pursue complete smoking cessation.  Recommended using gum for cravings.    I spent over 40 minutes evaluating, treating, counseling, coordinating patient care, reviewing old records, and documenting.    Preventative Cardiology:   Tobacco Cessation: Cessation Counseling Provided   Obstructive Sleep Apnea Screening: Deffered  AAA Screening: N/A  Peripheral Arterial Disease Screening: N/A    Follow Up:   Return in about 2 months (around  3/10/2024).    Thank you for allowing me to participate in the care of your patient. Please to not hesitate to contact me with additional questions or concerns.       Geovany Fraire MD  01/10/2024  13:52 EST

## 2024-01-10 NOTE — PATIENT INSTRUCTIONS
Continue aspirin and Plavix for 1 year after stenting.    Ordering head to toe CT scan to check for fibromuscular dysplasia.    Check blood pressure once daily and keep a log.  Target blood pressure less than 130/80.  Call the office if blood pressures are consistently not at goal.

## 2024-01-22 DIAGNOSIS — I25.42 SPONTANEOUS DISSECTION OF CORONARY ARTERY: Primary | ICD-10-CM

## 2024-01-24 ENCOUNTER — TREATMENT (OUTPATIENT)
Dept: CARDIAC REHAB | Facility: HOSPITAL | Age: 44
End: 2024-01-24
Payer: COMMERCIAL

## 2024-01-24 DIAGNOSIS — I21.4 NSTEMI (NON-ST ELEVATED MYOCARDIAL INFARCTION): Primary | ICD-10-CM

## 2024-01-24 DIAGNOSIS — Z95.5 STATUS POST CORONARY ARTERY STENT PLACEMENT: ICD-10-CM

## 2024-01-24 PROCEDURE — 93798 PHYS/QHP OP CAR RHAB W/ECG: CPT

## 2024-01-24 NOTE — PROGRESS NOTES
Pt was seen today in [facility] for a Phase II visit. Vital signs and session notes recorded in [employer] and will be scanned into Epic by HIM.

## 2024-01-29 RX ORDER — CARVEDILOL 12.5 MG/1
12.5 TABLET ORAL 2 TIMES DAILY WITH MEALS
Qty: 60 TABLET | Refills: 6 | Status: SHIPPED | OUTPATIENT
Start: 2024-01-29

## 2024-01-29 NOTE — TELEPHONE ENCOUNTER
----- Message from Leigh Negron sent at 1/29/2024 12:05 PM EST -----  Regarding: Carvedilol Refill  Contact: [phone]  I am out of carvedilol 12.5 MG tablet. What do I need to do to get this Rx refilled? Or do I need to change the Rx? I was just in the office a couple weeks ago and I didn't realize I had any medications that did not have refills.

## 2024-01-30 ENCOUNTER — HOSPITAL ENCOUNTER (OUTPATIENT)
Dept: CT IMAGING | Facility: HOSPITAL | Age: 44
Discharge: HOME | End: 2024-01-30
Payer: COMMERCIAL

## 2024-01-30 ENCOUNTER — APPOINTMENT (OUTPATIENT)
Dept: CT IMAGING | Facility: HOSPITAL | Age: 44
End: 2024-01-30
Payer: COMMERCIAL

## 2024-01-30 DIAGNOSIS — I25.42 SPONTANEOUS DISSECTION OF CORONARY ARTERY: ICD-10-CM

## 2024-01-30 PROCEDURE — 71275 CT ANGIOGRAPHY CHEST: CPT

## 2024-01-30 PROCEDURE — 74174 CTA ABD&PLVS W/CONTRAST: CPT

## 2024-01-30 PROCEDURE — 70496 CT ANGIOGRAPHY HEAD: CPT

## 2024-01-30 PROCEDURE — 25510000001 IOPAMIDOL 61 % SOLUTION: Performed by: INTERNAL MEDICINE

## 2024-01-30 PROCEDURE — 70498 CT ANGIOGRAPHY NECK: CPT

## 2024-01-30 RX ADMIN — IOPAMIDOL 100 ML: 612 INJECTION, SOLUTION INTRAVENOUS at 09:54

## 2024-01-30 RX ADMIN — IOPAMIDOL 100 ML: 612 INJECTION, SOLUTION INTRAVENOUS at 09:55

## 2024-01-31 ENCOUNTER — TELEPHONE (OUTPATIENT)
Dept: OBSTETRICS AND GYNECOLOGY | Facility: CLINIC | Age: 44
End: 2024-01-31
Payer: COMMERCIAL

## 2024-01-31 NOTE — TELEPHONE ENCOUNTER
Now that you are on a blood thinner potential for bleeding does increase.  I would observe over the next 3 cycles and if it becomes more more progressive return to clinic for a ultrasound.

## 2024-01-31 NOTE — TELEPHONE ENCOUNTER
----- Message from Leigh Negron sent at 1/31/2024 10:26 AM EST -----  Regarding: I'm having a period?  Contact: [phone]  A lot has happened since the tubal and ablation procedure in November. I had a heart attack and stents placed in December, and now I'm on a bunch of blood thinners. Yesterday, I had a contrast CT for half of my body and afterward I seemed to have blood tinged discharge. Today, I definitely have vaginal bleeding.     How concerning is this? Was I wrong to assume this wouldn't happen after the ablation?    Thanks,    Leigh

## 2024-02-01 ENCOUNTER — TELEPHONE (OUTPATIENT)
Dept: CARDIOLOGY | Facility: CLINIC | Age: 44
End: 2024-02-01
Payer: COMMERCIAL

## 2024-02-01 ENCOUNTER — TELEPHONE (OUTPATIENT)
Dept: OBSTETRICS AND GYNECOLOGY | Facility: CLINIC | Age: 44
End: 2024-02-01
Payer: COMMERCIAL

## 2024-02-01 DIAGNOSIS — I77.3 FIBROMUSCULAR DYSPLASIA: Primary | ICD-10-CM

## 2024-02-01 NOTE — TELEPHONE ENCOUNTER
It could be.  These types of cysts-that are simple-to come and go and the ovaries is natural part of hormonal fluctuations and cycles.  We would want to follow-up on that in about 8 weeks with an ultrasound here in the office.  Thank you

## 2024-02-01 NOTE — TELEPHONE ENCOUNTER
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  Per the report, the abnormality appears to be mild.  I suspect that there is not much that can be done for this other than treating risk factors such as blood pressure and cholesterol.  She will need to be on aspirin and a statin for the rest of her life.  I do think it would be beneficial for her to be evaluated by vascular surgery to get a final decision as to whether any further action needs to be taken regarding this new diagnosis.  Also, we need to find out if she is going to get a CTA of her bilateral lower extremities as ordered.  However, we can go ahead and place referral for vascular to get her an appointment while we wait for that CTA.

## 2024-02-01 NOTE — TELEPHONE ENCOUNTER
----- Message from Leigh Negron sent at 2/1/2024  8:22 AM EST -----  Regarding:  response :  Contact: [phone]  I just got results from a CT that suggested possible 4 cm ovarian cyst. Could that be the culprit?

## 2024-02-01 NOTE — TELEPHONE ENCOUNTER
Pt was notified in great detail of all result notes and states verbal understanding to information given. Pt states she will contact her PCP in regards to her liver and the ovarian cyst. Referral Placed for Vascular and Pt also states the CTA of her lower extremities is later this month

## 2024-02-12 ENCOUNTER — HOSPITAL ENCOUNTER (OUTPATIENT)
Dept: CT IMAGING | Facility: HOSPITAL | Age: 44
Discharge: HOME | End: 2024-02-12
Admitting: INTERNAL MEDICINE
Payer: COMMERCIAL

## 2024-02-12 DIAGNOSIS — I25.42 SPONTANEOUS DISSECTION OF CORONARY ARTERY: ICD-10-CM

## 2024-02-12 PROCEDURE — 25510000001 IOPAMIDOL 61 % SOLUTION: Performed by: INTERNAL MEDICINE

## 2024-02-12 PROCEDURE — 73706 CT ANGIO LWR EXTR W/O&W/DYE: CPT

## 2024-02-12 RX ADMIN — IOPAMIDOL 100 ML: 612 INJECTION, SOLUTION INTRAVENOUS at 07:58

## 2024-02-26 RX ORDER — VALACYCLOVIR HYDROCHLORIDE 500 MG/1
500 TABLET, FILM COATED ORAL DAILY
Qty: 102 TABLET | Refills: 2 | Status: SHIPPED | OUTPATIENT
Start: 2024-02-26

## 2024-02-28 ENCOUNTER — APPOINTMENT (OUTPATIENT)
Dept: GENERAL RADIOLOGY | Facility: HOSPITAL | Age: 44
End: 2024-02-28
Payer: COMMERCIAL

## 2024-02-28 ENCOUNTER — HOSPITAL ENCOUNTER (EMERGENCY)
Facility: HOSPITAL | Age: 44
Discharge: HOME | End: 2024-02-28
Attending: EMERGENCY MEDICINE | Admitting: EMERGENCY MEDICINE
Payer: COMMERCIAL

## 2024-02-28 VITALS
HEART RATE: 62 BPM | OXYGEN SATURATION: 97 % | WEIGHT: 190 LBS | TEMPERATURE: 98.4 F | SYSTOLIC BLOOD PRESSURE: 152 MMHG | DIASTOLIC BLOOD PRESSURE: 105 MMHG | HEIGHT: 69 IN | RESPIRATION RATE: 17 BRPM | BODY MASS INDEX: 28.14 KG/M2

## 2024-02-28 DIAGNOSIS — R51.9 NONINTRACTABLE HEADACHE, UNSPECIFIED CHRONICITY PATTERN, UNSPECIFIED HEADACHE TYPE: ICD-10-CM

## 2024-02-28 DIAGNOSIS — I10 POORLY-CONTROLLED HYPERTENSION: Primary | ICD-10-CM

## 2024-02-28 DIAGNOSIS — R07.89 ATYPICAL CHEST PAIN: ICD-10-CM

## 2024-02-28 LAB
ALBUMIN SERPL-MCNC: 4.3 G/DL (ref 3.5–5.2)
ALBUMIN/GLOB SERPL: 2 G/DL
ALP SERPL-CCNC: 76 U/L (ref 39–117)
ALT SERPL W P-5'-P-CCNC: 52 U/L (ref 1–33)
ANION GAP SERPL CALCULATED.3IONS-SCNC: 8.8 MMOL/L (ref 5–15)
AST SERPL-CCNC: 28 U/L (ref 1–32)
BASOPHILS # BLD AUTO: 0.07 10*3/MM3 (ref 0–0.2)
BASOPHILS NFR BLD AUTO: 1.1 % (ref 0–1.5)
BILIRUB SERPL-MCNC: 0.5 MG/DL (ref 0–1.2)
BILIRUB UR QL STRIP: NEGATIVE
BUN SERPL-MCNC: 12 MG/DL (ref 6–20)
BUN/CREAT SERPL: 15.8 (ref 7–25)
CALCIUM SPEC-SCNC: 8.9 MG/DL (ref 8.6–10.5)
CHLORIDE SERPL-SCNC: 104 MMOL/L (ref 98–107)
CLARITY UR: CLEAR
CO2 SERPL-SCNC: 25.2 MMOL/L (ref 22–29)
COLOR UR: YELLOW
CREAT SERPL-MCNC: 0.76 MG/DL (ref 0.57–1)
D DIMER PPP FEU-MCNC: <0.27 MCGFEU/ML (ref 0–0.5)
D-LACTATE SERPL-SCNC: 1 MMOL/L (ref 0.5–2)
DEPRECATED RDW RBC AUTO: 43.8 FL (ref 37–54)
EGFRCR SERPLBLD CKD-EPI 2021: 99.9 ML/MIN/1.73
EOSINOPHIL # BLD AUTO: 0.09 10*3/MM3 (ref 0–0.4)
EOSINOPHIL NFR BLD AUTO: 1.4 % (ref 0.3–6.2)
ERYTHROCYTE [DISTWIDTH] IN BLOOD BY AUTOMATED COUNT: 13.5 % (ref 12.3–15.4)
GEN 5 2HR TROPONIN T REFLEX: <6 NG/L
GLOBULIN UR ELPH-MCNC: 2.2 GM/DL
GLUCOSE SERPL-MCNC: 86 MG/DL (ref 65–99)
GLUCOSE UR STRIP-MCNC: NEGATIVE MG/DL
HCT VFR BLD AUTO: 41.6 % (ref 34–46.6)
HGB BLD-MCNC: 14 G/DL (ref 12–15.9)
HGB UR QL STRIP.AUTO: NEGATIVE
HOLD SPECIMEN: NORMAL
HOLD SPECIMEN: NORMAL
IMM GRANULOCYTES # BLD AUTO: 0.02 10*3/MM3 (ref 0–0.05)
IMM GRANULOCYTES NFR BLD AUTO: 0.3 % (ref 0–0.5)
KETONES UR QL STRIP: NEGATIVE
LEUKOCYTE ESTERASE UR QL STRIP.AUTO: NEGATIVE
LYMPHOCYTES # BLD AUTO: 1.7 10*3/MM3 (ref 0.7–3.1)
LYMPHOCYTES NFR BLD AUTO: 26.5 % (ref 19.6–45.3)
MAGNESIUM SERPL-MCNC: 2.2 MG/DL (ref 1.6–2.6)
MCH RBC QN AUTO: 29.7 PG (ref 26.6–33)
MCHC RBC AUTO-ENTMCNC: 33.7 G/DL (ref 31.5–35.7)
MCV RBC AUTO: 88.1 FL (ref 79–97)
MONOCYTES # BLD AUTO: 0.45 10*3/MM3 (ref 0.1–0.9)
MONOCYTES NFR BLD AUTO: 7 % (ref 5–12)
NEUTROPHILS NFR BLD AUTO: 4.09 10*3/MM3 (ref 1.7–7)
NEUTROPHILS NFR BLD AUTO: 63.7 % (ref 42.7–76)
NITRITE UR QL STRIP: NEGATIVE
NRBC BLD AUTO-RTO: 0 /100 WBC (ref 0–0.2)
NT-PROBNP SERPL-MCNC: 180.2 PG/ML (ref 0–450)
PH UR STRIP.AUTO: 6.5 [PH] (ref 5–8)
PLATELET # BLD AUTO: 241 10*3/MM3 (ref 140–450)
PMV BLD AUTO: 11.5 FL (ref 6–12)
POTASSIUM SERPL-SCNC: 4.1 MMOL/L (ref 3.5–5.2)
PROT SERPL-MCNC: 6.5 G/DL (ref 6–8.5)
PROT UR QL STRIP: NEGATIVE
RBC # BLD AUTO: 4.72 10*6/MM3 (ref 3.77–5.28)
SODIUM SERPL-SCNC: 138 MMOL/L (ref 136–145)
SP GR UR STRIP: <=1.005 (ref 1–1.03)
TROPONIN T DELTA: NORMAL
TROPONIN T SERPL HS-MCNC: <6 NG/L
TSH SERPL DL<=0.05 MIU/L-ACNC: 1.32 UIU/ML (ref 0.27–4.2)
UROBILINOGEN UR QL STRIP: NORMAL
WBC NRBC COR # BLD AUTO: 6.42 10*3/MM3 (ref 3.4–10.8)
WHOLE BLOOD HOLD COAG: NORMAL
WHOLE BLOOD HOLD SPECIMEN: NORMAL

## 2024-02-28 PROCEDURE — 80050 GENERAL HEALTH PANEL: CPT | Performed by: EMERGENCY MEDICINE

## 2024-02-28 PROCEDURE — 83880 ASSAY OF NATRIURETIC PEPTIDE: CPT | Performed by: EMERGENCY MEDICINE

## 2024-02-28 PROCEDURE — 83735 ASSAY OF MAGNESIUM: CPT | Performed by: EMERGENCY MEDICINE

## 2024-02-28 PROCEDURE — 81003 URINALYSIS AUTO W/O SCOPE: CPT | Performed by: EMERGENCY MEDICINE

## 2024-02-28 PROCEDURE — 25810000003 SODIUM CHLORIDE 0.9 % SOLUTION: Performed by: EMERGENCY MEDICINE

## 2024-02-28 PROCEDURE — 83605 ASSAY OF LACTIC ACID: CPT | Performed by: EMERGENCY MEDICINE

## 2024-02-28 PROCEDURE — 71045 X-RAY EXAM CHEST 1 VIEW: CPT

## 2024-02-28 PROCEDURE — 84484 ASSAY OF TROPONIN QUANT: CPT | Performed by: EMERGENCY MEDICINE

## 2024-02-28 PROCEDURE — 99284 EMERGENCY DEPT VISIT MOD MDM: CPT

## 2024-02-28 PROCEDURE — 36415 COLL VENOUS BLD VENIPUNCTURE: CPT

## 2024-02-28 PROCEDURE — 85379 FIBRIN DEGRADATION QUANT: CPT | Performed by: EMERGENCY MEDICINE

## 2024-02-28 PROCEDURE — 93005 ELECTROCARDIOGRAM TRACING: CPT | Performed by: EMERGENCY MEDICINE

## 2024-02-28 RX ORDER — AMLODIPINE BESYLATE 5 MG/1
2.5 TABLET ORAL DAILY
Qty: 30 TABLET | Refills: 0 | Status: SHIPPED | OUTPATIENT
Start: 2024-02-28

## 2024-02-28 RX ORDER — SODIUM CHLORIDE 0.9 % (FLUSH) 0.9 %
10 SYRINGE (ML) INJECTION AS NEEDED
Status: DISCONTINUED | OUTPATIENT
Start: 2024-02-28 | End: 2024-02-28 | Stop reason: HOSPADM

## 2024-02-28 RX ORDER — ASPIRIN 325 MG
325 TABLET ORAL ONCE
Status: DISCONTINUED | OUTPATIENT
Start: 2024-02-28 | End: 2024-02-28 | Stop reason: HOSPADM

## 2024-02-28 RX ORDER — BUTALBITAL, ACETAMINOPHEN AND CAFFEINE 50; 325; 40 MG/1; MG/1; MG/1
1 TABLET ORAL ONCE
Status: COMPLETED | OUTPATIENT
Start: 2024-02-28 | End: 2024-02-28

## 2024-02-28 RX ORDER — CLONIDINE HYDROCHLORIDE 0.1 MG/1
0.1 TABLET ORAL ONCE
Status: COMPLETED | OUTPATIENT
Start: 2024-02-28 | End: 2024-02-28

## 2024-02-28 RX ORDER — CHLORTHALIDONE 25 MG/1
25 TABLET ORAL DAILY
Qty: 30 TABLET | Refills: 1 | Status: SHIPPED | OUTPATIENT
Start: 2024-02-28

## 2024-02-28 RX ORDER — BUTALBITAL, ACETAMINOPHEN AND CAFFEINE 50; 325; 40 MG/1; MG/1; MG/1
1 TABLET ORAL EVERY 6 HOURS PRN
Qty: 12 TABLET | Refills: 0 | Status: SHIPPED | OUTPATIENT
Start: 2024-02-28

## 2024-02-28 RX ADMIN — BUTALBITAL, ACETAMINOPHEN, AND CAFFEINE 1 TABLET: 50; 325; 40 TABLET ORAL at 15:16

## 2024-02-28 RX ADMIN — SODIUM CHLORIDE 1000 ML: 9 INJECTION, SOLUTION INTRAVENOUS at 10:37

## 2024-02-28 RX ADMIN — CLONIDINE HYDROCHLORIDE 0.1 MG: 0.1 TABLET ORAL at 14:53

## 2024-02-28 NOTE — Clinical Note
[facility] EMERGENCY DEPARTMENT  [address] [address]  Phone: [phone]    Leigh Negron was seen and treated in our emergency department on 2/28/2024.  She may return to work on 02/29/2024.         Thank you for choosing [facility].    Anjana Cordova RN

## 2024-02-28 NOTE — DISCHARGE INSTRUCTIONS
Escitalopram may cause headaches, and your headache may be related to the recent dose increase of escitalopram, discuss stopping that medication with your prescriber as it may require a tapering dose to stop. Monitor your blood pressure at home and keep a log for your providers to review. Continue carvedilol twice daily at 12.5 mg as directed.

## 2024-02-28 NOTE — ED PROVIDER NOTES
Subjective   History of Present Illness  44 year old female with history of SCAD in December 2023 status post stenting presents to the emergency department with concerns about intermittent chest pain for four days, gradual onset headache, and feeling like her heart is pounding. SCAD was thought to be related to prescription Adderall use for ADHD, and those medications were discontinued. She states she has been taking Strattera recently, but didn't take it for the past two days, and recently increased Lexapro dose. She reports associated increased fatigue and states she slept for over twelve hours yesterday which is unusual for her. The palpitations do not feel fast in rate, and have been present for approximately three days. She denies use of any over the counter decongestants recently. She denies recent head injury or any focal neurologic complaints.       Review of Systems   Constitutional:  Positive for fatigue. Negative for diaphoresis and fever.   HENT:  Negative for facial swelling.    Eyes:  Negative for photophobia and discharge.   Respiratory:  Negative for shortness of breath and stridor.    Cardiovascular:  Positive for chest pain and palpitations.   Neurological:  Positive for headaches. Negative for speech difficulty.       Past Medical History:   Diagnosis Date    ADHD (attention deficit hyperactivity disorder) 10/2002    Allergic 03/2002    Had prick test done several years ago. Didn't explain sympto    Anemia     Anxiety 10/2006    I have a stressful job    Coronary artery disease 12/22/2023    Heart attack    Depression 06/2018    Diverticulitis 2023    Diverticulitis of colon 04/25/2023    Diverticulosis 04/25/2023    Diagnosed at [facility].    Exposure to sexually transmitted disease (STD)     Headache     History of anemia     History of body piercing     History of ECG 08/26/2016    History of hypertension     History of kidney infection     History of migraine     Hyperlipidemia      Hypertension     no medication required    Low back pain 2016    MRI showed bulging discs at L4 & L5. 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    Urinary tract infection     Varicella 1986    Visual impairment 2002    I wear glasses   SCAD Dec 2023    Allergies   Allergen Reactions    Augmentin [Amoxicillin-Pot Clavulanate] Nausea And Vomiting    Bactrim [Sulfamethoxazole-Trimethoprim] GI Intolerance       Past Surgical History:   Procedure Laterality Date    BREAST AUGMENTATION  2016    BREAST SURGERY  2016    Breast augmentation    CARDIAC CATHETERIZATION N/A 2023    Procedure: Coronary angiography;  Surgeon: Jacinto Daly MD;  Location: [facility] CATH INVASIVE LOCATION;  Service: Cardiology;  Laterality: N/A;     SECTION      COLONOSCOPY  2023    CORONARY STENT PLACEMENT  2023    COSMETIC SURGERY  2016    Breast augmentation    FOREARM FRACTURE SURGERY      SPINE SURGERY  2020    Discectomy L5/S1    TUBAL ABDOMINAL LIGATION  2023    Also ablation    TUBAL COAGULATION LAPAROSCOPIC N/A 2023    Procedure: TUBAL FALLOPE FILSHIE CLIPPING LAPAROSCOPIC, hysteroscopy, dilatation curettage, removal of ParaGard IUD,cerene ablation;  Surgeon: tSeven Avila MD;  Location: [facility] OR;  Service: Obstetrics/Gynecology;  Laterality: N/A;    UMBILICAL HERNIA REPAIR      with mesh    WISDOM TOOTH EXTRACTION         Family History   Problem Relation Age of Onset    Hyperlipidemia Mother     Obesity Mother     Osteoporosis Mother     Arthritis Mother         Arthritis in foot    Diabetes Mother         She is currently pre diabetic    Stroke Father     Arthritis Father     Hyperlipidemia Father      Hypertension Father     Mental illness Father     Migraines Father     Obesity Father     Cancer Father         Squamous cell carcinoma    Drug abuse Father         Lifetime marijuana use    Breast cancer Paternal Grandmother     Arthritis Paternal Uncle         Rheumatoid arthritis    Arthritis Other        Social History     Socioeconomic History    Marital status:    Tobacco Use    Smoking status: Some Days     Types: Electronic Cigarette     Passive exposure: Past    Smokeless tobacco: Never    Tobacco comments:     Not sure when i quit exactly. I never felt particularly addicted so i just quit.   Vaping Use    Vaping Use: Every day    Substances: Nicotine   Substance and Sexual Activity    Alcohol use: Yes     Alcohol/week: 1.0 standard drink of alcohol     Types: 1 Shots of liquor per week     Comment: Occasional alcohol use    Drug use: No    Sexual activity: Yes     Partners: Male     Birth control/protection: Tubal ligation     Comment: I'm interested in getting my tubes tied           Objective   Physical Exam  Vitals and nursing note reviewed.   Constitutional:       General: She is not in acute distress.     Appearance: She is not diaphoretic.      Comments: BMI 28.   Eyes:      Comments: No photophobia or nystagmus.   Neck:      Comments: No meningismus.  Cardiovascular:      Rate and Rhythm: Normal rate and regular rhythm.      Heart sounds: Normal heart sounds. No murmur heard.     No friction rub. No gallop.   Pulmonary:      Effort: Pulmonary effort is normal. No tachypnea or respiratory distress.      Breath sounds: Normal breath sounds. No stridor. No decreased breath sounds, wheezing, rhonchi or rales.   Musculoskeletal:      Cervical back: Neck supple.      Comments: No significant peripheral edema. No calf tenderness bilaterally. 2+ radial pulses bilaterally, symmetric.   Skin:     General: Skin is warm and dry.   Neurological:      Mental Status: She is alert.      Comments: Normal  speech, no dysarthria. No facial droop. Motor 5/5 power x 4 extremities, symmetric. Sensation grossly intact to light touch. Normal gait. No ataxia.                      ED Course  ED Course as of 02/28/24 1521 Wed Feb 28, 2024   1500 Case discussed with cardiologist Dr. Daly who recommends starting amlodipine and chlorthalidone, continue carvedilol 12 and half twice a day. [LD]   1521 152/105 BP, HR 62 BPM. [LD]   1521 Telemetry alarms reviewed, no arrhythmias. [LD]      ED Course User Index  [LD] Julianna Elise MD                                             Medical Decision Making  Differential diagnosis includes acute coronary syndrome, poorly controlled hypertension, medication side effect, aortic dissection, pulmonary embolus, esophageal spasm, esophagitis, musculoskeletal pain, and others.    Problems Addressed:  Atypical chest pain: complicated acute illness or injury  Nonintractable headache, unspecified chronicity pattern, unspecified headache type: complicated acute illness or injury  Poorly-controlled hypertension: complicated acute illness or injury    Amount and/or Complexity of Data Reviewed  External Data Reviewed: notes.     Details: I reviewed her discharge summary from 12/24/23.  I reviewed her [facility] report.  Labs: ordered. Decision-making details documented in ED Course.  Radiology: ordered. Decision-making details documented in ED Course.  ECG/medicine tests: ordered and independent interpretation performed. Decision-making details documented in ED Course.     Details: EKG at 1019 shows normal sinus rhythm at a rate of 72 bpm, normal intervals, no acute ischemic changes. LGv=181 ms.  Discussion of management or test interpretation with external provider(s): See ED course, patient was discussed with Dr. Daly, cardiologist on call.     Risk  OTC drugs.  Prescription drug management.      Recent Results (from the past 24 hour(s))   Comprehensive Metabolic Panel    Collection Time:  02/28/24 10:28 AM    Specimen: Blood   Result Value Ref Range    Glucose 86 65 - 99 mg/dL    BUN 12 6 - 20 mg/dL    Creatinine 0.76 0.57 - 1.00 mg/dL    Sodium 138 136 - 145 mmol/L    Potassium 4.1 3.5 - 5.2 mmol/L    Chloride 104 98 - 107 mmol/L    CO2 25.2 22.0 - 29.0 mmol/L    Calcium 8.9 8.6 - 10.5 mg/dL    Total Protein 6.5 6.0 - 8.5 g/dL    Albumin 4.3 3.5 - 5.2 g/dL    ALT (SGPT) 52 (H) 1 - 33 U/L    AST (SGOT) 28 1 - 32 U/L    Alkaline Phosphatase 76 39 - 117 U/L    Total Bilirubin 0.5 0.0 - 1.2 mg/dL    Globulin 2.2 gm/dL    A/G Ratio 2.0 g/dL    BUN/Creatinine Ratio 15.8 7.0 - 25.0    Anion Gap 8.8 5.0 - 15.0 mmol/L    eGFR 99.9 >60.0 mL/min/1.73   High Sensitivity Troponin T    Collection Time: 02/28/24 10:28 AM    Specimen: Blood   Result Value Ref Range    HS Troponin T <6 <14 ng/L   Green Top (Gel)    Collection Time: 02/28/24 10:28 AM   Result Value Ref Range    Extra Tube Hold for add-ons.    Lavender Top    Collection Time: 02/28/24 10:28 AM   Result Value Ref Range    Extra Tube hold for add-on    Gold Top - SST    Collection Time: 02/28/24 10:28 AM   Result Value Ref Range    Extra Tube Hold for add-ons.    Light Blue Top    Collection Time: 02/28/24 10:28 AM   Result Value Ref Range    Extra Tube Hold for add-ons.    CBC Auto Differential    Collection Time: 02/28/24 10:28 AM    Specimen: Blood   Result Value Ref Range    WBC 6.42 3.40 - 10.80 10*3/mm3    RBC 4.72 3.77 - 5.28 10*6/mm3    Hemoglobin 14.0 12.0 - 15.9 g/dL    Hematocrit 41.6 34.0 - 46.6 %    MCV 88.1 79.0 - 97.0 fL    MCH 29.7 26.6 - 33.0 pg    MCHC 33.7 31.5 - 35.7 g/dL    RDW 13.5 12.3 - 15.4 %    RDW-SD 43.8 37.0 - 54.0 fl    MPV 11.5 6.0 - 12.0 fL    Platelets 241 140 - 450 10*3/mm3    Neutrophil % 63.7 42.7 - 76.0 %    Lymphocyte % 26.5 19.6 - 45.3 %    Monocyte % 7.0 5.0 - 12.0 %    Eosinophil % 1.4 0.3 - 6.2 %    Basophil % 1.1 0.0 - 1.5 %    Immature Grans % 0.3 0.0 - 0.5 %    Neutrophils, Absolute 4.09 1.70 - 7.00 10*3/mm3     Lymphocytes, Absolute 1.70 0.70 - 3.10 10*3/mm3    Monocytes, Absolute 0.45 0.10 - 0.90 10*3/mm3    Eosinophils, Absolute 0.09 0.00 - 0.40 10*3/mm3    Basophils, Absolute 0.07 0.00 - 0.20 10*3/mm3    Immature Grans, Absolute 0.02 0.00 - 0.05 10*3/mm3    nRBC 0.0 0.0 - 0.2 /100 WBC   Lactic Acid, Plasma    Collection Time: 02/28/24 10:28 AM    Specimen: Blood   Result Value Ref Range    Lactate 1.0 0.5 - 2.0 mmol/L   Magnesium    Collection Time: 02/28/24 10:28 AM    Specimen: Blood   Result Value Ref Range    Magnesium 2.2 1.6 - 2.6 mg/dL   TSH Rfx On Abnormal To Free T4    Collection Time: 02/28/24 10:28 AM    Specimen: Blood   Result Value Ref Range    TSH 1.320 0.270 - 4.200 uIU/mL   D-dimer, Quantitative    Collection Time: 02/28/24 10:28 AM    Specimen: Blood   Result Value Ref Range    D-Dimer, Quantitative <0.27 0.00 - 0.50 MCGFEU/mL   BNP    Collection Time: 02/28/24 10:28 AM    Specimen: Blood   Result Value Ref Range    proBNP 180.2 0.0 - 450.0 pg/mL   Urinalysis With Microscopic If Indicated (No Culture) - Urine, Clean Catch    Collection Time: 02/28/24 10:37 AM    Specimen: Urine, Clean Catch   Result Value Ref Range    Color, UA Yellow Yellow, Straw    Appearance, UA Clear Clear    pH, UA 6.5 5.0 - 8.0    Specific Gravity, UA <=1.005 1.005 - 1.030    Glucose, UA Negative Negative    Ketones, UA Negative Negative    Bilirubin, UA Negative Negative    Blood, UA Negative Negative    Protein, UA Negative Negative    Leuk Esterase, UA Negative Negative    Nitrite, UA Negative Negative    Urobilinogen, UA 0.2 E.U./dL 0.2 - 1.0 E.U./dL   High Sensitivity Troponin T 2Hr    Collection Time: 02/28/24  1:20 PM    Specimen: Blood   Result Value Ref Range    HS Troponin T <6 <14 ng/L    Troponin T Delta       Note: In addition to lab results from this visit, the labs listed above may include labs taken at another facility or during a different encounter within the last 24 hours. Please correlate lab times with ED  admission and discharge times for further clarification of the services performed during this visit.    XR Chest 1 View   Final Result   No acute cardiopulmonary process.                       This report was signed and finalized on 2/28/2024 11:07 AM by Shelly Toussaint MD.            Vitals:    02/28/24 1417 02/28/24 1432 02/28/24 1447 02/28/24 1517   BP: (!) 149/105 (!) 161/122 (!) 159/108 (!) 152/105   BP Location:       Patient Position:       Pulse: 61 62 59 62   Resp:       Temp:       SpO2:       Weight:       Height:         Medications   sodium chloride 0.9 % flush 10 mL (has no administration in time range)   aspirin tablet 325 mg (325 mg Oral Not Given 2/28/24 1037)   sodium chloride 0.9 % bolus 1,000 mL (0 mL Intravenous Stopped 2/28/24 1310)   cloNIDine (CATAPRES) tablet 0.1 mg (0.1 mg Oral Given 2/28/24 1453)   butalbital-acetaminophen-caffeine (FIORICET, ESGIC) -40 MG per tablet 1 tablet (1 tablet Oral Given 2/28/24 1516)     ECG/EMG Results (last 24 hours)       Procedure Component Value Units Date/Time    ECG 12 Lead ED Triage Standing Order; Chest Pain [[id number]] Resulted: 02/28/24 1024     Updated: 02/28/24 1024          ECG 12 Lead ED Triage Standing Order; Chest Pain   Final Result        Discharge instructions include:  Escitalopram may cause headaches, and your headache may be related to the recent dose increase of escitalopram, discuss stopping that medication with your prescriber as it may require a tapering dose to stop. Monitor your blood pressure at home and keep a log for your providers to review. Continue carvedilol twice daily at 12.5 mg as directed.       Final diagnoses:   Poorly-controlled hypertension   Atypical chest pain   Nonintractable headache, unspecified chronicity pattern, unspecified headache type       ED Disposition  ED Disposition       ED Disposition   Discharge    Condition   Stable    Comment   --               Marlen Navarrete MD  [address] [place]  [address]  [phone]    Schedule an appointment as soon as possible for a visit in 3 days  primary care provider, blood pressure recheck    Jacinto Daly MD  [address] [facility] [address]  [place] [id number] [phone]    On 3/18/2024  as scheduled, cardiology         Medication List        New Prescriptions      amLODIPine 5 MG tablet  Commonly known as: NORVASC  Take 0.5 tablets by mouth Daily.     butalbital-acetaminophen-caffeine -40 MG per tablet  Commonly known as: FIORICET, ESGIC  Take 1 tablet by mouth Every 6 (Six) Hours As Needed for Headache.     chlorthalidone 25 MG tablet  Commonly known as: HYGROTON  Take 1 tablet by mouth Daily.               Where to Get Your Medications        These medications were sent to [facility] [address] [place] [address] - [address] [place] [address] - [phone]  - [phone]   [address] [facility] [address]      Phone: [phone]   amLODIPine 5 MG tablet  butalbital-acetaminophen-caffeine -40 MG per tablet  chlorthalidone 25 MG tablet            Julianna Elise MD  03/01/24 4226

## 2024-03-01 ENCOUNTER — HOSPITAL ENCOUNTER (OUTPATIENT)
Dept: ULTRASOUND IMAGING | Facility: HOSPITAL | Age: 44
Discharge: HOME | End: 2024-03-01
Admitting: FAMILY MEDICINE
Payer: COMMERCIAL

## 2024-03-01 DIAGNOSIS — E04.1 THYROID NODULE: ICD-10-CM

## 2024-03-01 PROCEDURE — 76536 US EXAM OF HEAD AND NECK: CPT

## 2024-03-14 ENCOUNTER — TELEPHONE (OUTPATIENT)
Dept: CARDIOLOGY | Facility: CLINIC | Age: 44
End: 2024-03-14
Payer: MEDICAID

## 2024-03-15 NOTE — PROGRESS NOTES
[facility] Cardiology Office Follow Up Note    Leigh Negron  [id number]  2024    Primary Care Provider: Marlen Navarrete MD    Chief Complaint   Patient presents with    Follow-up       Subjective     History of Present Illness:   Leigh Negron is a 44 y.o. female with SCAD, recent NSTEMI, hypertension, and recent diagnosis of fibromuscular dysplasia who presents to the Cardiology Clinic for regular follow-up.  Since her last visit, patient went to the ER with chest pain.  She was ruled out for ischemia but amlodipine and chlorthalidone were started to get her blood pressure under better control.  She says she has been doing well and has no chest pain or dyspnea with exertion.  Has been more sedentary since her SCAD diagnosis.  Did go to cardiac rehab once but says she will not be going back because her insurance was requiring $155 per session for exercises that she feels she can do at home.  She feels that taking half tablet of the amlodipine is annoying.  Otherwise, is doing well on aspirin and Plavix.    Past Cardiac Testin. Last Coronary Angio: 23  SCAD involving the large OM1  Extreme mid to distal LAD tortuosity suggest previous SCAD  Successful PCI x4 of LCX       2. Last Echo: 23    Left ventricular systolic function is normal. Estimated left ventricular EF = 56% Left ventricular ejection fraction appears to be 56 - 60%.    Left ventricular diastolic function was normal.    Estimated right ventricular systolic pressure from tricuspid regurgitation is normal (<35 mmHg).     3. Prior Stress Testing: none    4. Prior Holter Monitor: none    Review of Systems:  Review of Systems   Constitutional: Negative.    Respiratory: Negative.     Cardiovascular: Negative.    Gastrointestinal: Negative.    Musculoskeletal: Negative.    Neurological: Negative.        I have reviewed and/or updated the patient's past medical, past surgical, family, social history, problem  "list and allergies as appropriate.       Current Outpatient Medications:     aspirin 325 MG EC tablet, Take 1 tablet by mouth Daily for 90 days., Disp: 90 tablet, Rfl: 0    atorvastatin (LIPITOR) 80 MG tablet, Take 1 tablet by mouth Every Night for 90 days., Disp: 90 tablet, Rfl: 0    buPROPion XL (WELLBUTRIN XL) 150 MG 24 hr tablet, Take 1 tablet by mouth Daily., Disp: , Rfl:     butalbital-acetaminophen-caffeine (FIORICET, ESGIC) -40 MG per tablet, Take 1 tablet by mouth Every 6 (Six) Hours As Needed for Headache., Disp: 12 tablet, Rfl: 0    carvedilol (Coreg) 25 MG tablet, Take 1 tablet by mouth 2 (Two) Times a Day With Meals for 360 days., Disp: 90 tablet, Rfl: 3    chlorthalidone (HYGROTON) 25 MG tablet, Take 1 tablet by mouth Daily., Disp: 30 tablet, Rfl: 1    clopidogrel (PLAVIX) 75 MG tablet, Take 1 tablet by mouth Daily for 360 days., Disp: 90 tablet, Rfl: 3    cyclobenzaprine (FLEXERIL) 5 MG tablet, Take 1 tablet by mouth At Night As Needed for Muscle Spasms., Disp: 30 tablet, Rfl: 0    fluticasone (FLONASE) 50 MCG/ACT nasal spray, , Disp: , Rfl:     valACYclovir (VALTREX) 500 MG tablet, TAKE 1 TABLET BY MOUTH DAILY, Disp: 102 tablet, Rfl: 2    escitalopram (LEXAPRO) 5 MG tablet, Take 1 tablet by mouth Daily. (Patient not taking: Reported on 3/19/2024), Disp: , Rfl:        Objective     Vitals:  Vitals:    03/19/24 1033   BP: 132/78   BP Location: Right arm   Patient Position: Sitting   Cuff Size: Adult   Pulse: 69   SpO2: 100%   Weight: 85.3 kg (188 lb)   Height: 175.3 cm (69.02\")       Physical Exam:  Vitals reviewed.   Constitutional:       Appearance: Healthy appearance. Not in distress.   Neck:      Vascular: No JVD.   Pulmonary:      Effort: Pulmonary effort is normal.      Breath sounds: Normal breath sounds.   Cardiovascular:      Normal rate. Regular rhythm. Normal S1. Normal S2.       Murmurs: There is no murmur.      No gallop.  No click. No rub.   Pulses:     Intact distal pulses. "   Edema:     Peripheral edema absent.   Abdominal:      General: There is no distension.      Palpations: Abdomen is soft.      Tenderness: There is no abdominal tenderness.   Skin:     General: Skin is warm and dry.         Results Review:   I reviewed the patient's new clinical results.  I reviewed the patient's new imaging results and agree with the interpretation.  I reviewed the patient's other test results and agree with the interpretation    High Sensitivity Troponin T 2Hr (02/28/2024 13:20)  BNP (02/28/2024 10:28)  D-dimer, Quantitative (02/28/2024 10:28)  TSH Rfx On Abnormal To Free T4 (02/28/2024 10:28)  Magnesium (02/28/2024 10:28)  Comprehensive Metabolic Panel (02/28/2024 10:28)  CBC & Differential (02/28/2024 10:28)    Procedures        Assessment & Plan   Diagnoses and all orders for this visit:    1. Spontaneous dissection of coronary artery (Primary)  2. History of non-ST elevation myocardial infarction (NSTEMI)  H/o NSTEMI 2/2 SCAD. s/p PCI x 4 to OM.  Extreme mid to distal LAD tortuosity suggest prior silent SCAD events. Echo preserved EF.    Asymptomatic from this standpoint.  Was having some chest pain with elevated blood pressure but that seems to have resolved with additional antihypertensives.  No evidence of heart failure on exam.  -- Continue DAPT with aspirin and Plavix for 1 year post PCI (through 12/23/2024)  -- Okay to decrease aspirin from 325 to 81 mg  -- Continue beta-blocker and high intensity statin   -- Advised patient to slowly increase her degree of aerobic exercise to maintain good cardiovascular health.    3. Essential hypertension  Goal <130/80.  Better controlled on multiple agents but would like to consolidate since having so many medications is confusing to her.  -- Will maximize carvedilol to 25 twice daily.  Stop amlodipine  -- Continue chlorthalidone.  Checking BMP for renal function and electrolytes and starting diuretic.    4. Fibromuscular dysplasia  Head to toe CTA  was remarkable for beading involving the proximal right ICA, right M1 and questionable involvement of left M1 segment and bilateral proximal PCA and basilar artery.  Otherwise, no evidence of FMD in the chest, abdomen, pelvis, or extremities.  Incidental finding of hepatic steatosis and 4 cm ovarian cyst.  Mild renal artery calcification.  -- Patient has been referred to neurosurgery/vascular and appointment is pending  -- Recommended that she follow-up with her primary care for her fatty liver.  Has had some elevated enzymes over the past few checks.    -- Also recommend GYN look into potential ovarian cyst.          Plan   Orders Placed This Encounter   Procedures    Basic Metabolic Panel     Standing Status:   Future     Standing Expiration Date:   3/19/2025     Order Specific Question:   Release to patient     Answer:   Routine Release [[id number]]     Medications:  -     carvedilol (Coreg) 25 MG tablet; Take 1 tablet by mouth 2 (Two) Times a Day With Meals for 360 days.  Dispense: 90 tablet; Refill: 3  -     clopidogrel (PLAVIX) 75 MG tablet; Take 1 tablet by mouth Daily for 360 days.  Dispense: 90 tablet; Refill: 3    Preventative Cardiology:   Tobacco Cessation: N/A  Obstructive Sleep Apnea Screening: N/A  AAA Screening: N/A  Peripheral Arterial Disease Screening: N/A       Follow Up:   Return in about 3 months (around 6/19/2024).    Thank you for allowing me to participate in the care of your patient. Please to not hesitate to contact me with additional questions or concerns.     Geovany Fraire MD  03/19/2024  15:58 EDT

## 2024-03-19 ENCOUNTER — LAB (OUTPATIENT)
Dept: LAB | Facility: HOSPITAL | Age: 44
End: 2024-03-19
Payer: COMMERCIAL

## 2024-03-19 ENCOUNTER — OFFICE VISIT (OUTPATIENT)
Dept: CARDIOLOGY | Facility: CLINIC | Age: 44
End: 2024-03-19
Payer: MEDICAID

## 2024-03-19 VITALS
DIASTOLIC BLOOD PRESSURE: 78 MMHG | HEIGHT: 69 IN | OXYGEN SATURATION: 100 % | SYSTOLIC BLOOD PRESSURE: 132 MMHG | BODY MASS INDEX: 27.85 KG/M2 | HEART RATE: 69 BPM | WEIGHT: 188 LBS

## 2024-03-19 DIAGNOSIS — I25.42 SPONTANEOUS DISSECTION OF CORONARY ARTERY: Primary | Chronic | ICD-10-CM

## 2024-03-19 DIAGNOSIS — I77.3 FIBROMUSCULAR DYSPLASIA: Chronic | ICD-10-CM

## 2024-03-19 DIAGNOSIS — I10 ESSENTIAL HYPERTENSION: Chronic | ICD-10-CM

## 2024-03-19 LAB
ANION GAP SERPL CALCULATED.3IONS-SCNC: 12.4 MMOL/L (ref 5–15)
BUN SERPL-MCNC: 10 MG/DL (ref 6–20)
BUN/CREAT SERPL: 13.2 (ref 7–25)
CALCIUM SPEC-SCNC: 9.3 MG/DL (ref 8.6–10.5)
CHLORIDE SERPL-SCNC: 98 MMOL/L (ref 98–107)
CO2 SERPL-SCNC: 28.6 MMOL/L (ref 22–29)
CREAT SERPL-MCNC: 0.76 MG/DL (ref 0.57–1)
EGFRCR SERPLBLD CKD-EPI 2021: 99.9 ML/MIN/1.73
GLUCOSE SERPL-MCNC: 96 MG/DL (ref 65–99)
POTASSIUM SERPL-SCNC: 3.5 MMOL/L (ref 3.5–5.2)
SODIUM SERPL-SCNC: 139 MMOL/L (ref 136–145)

## 2024-03-19 PROCEDURE — 36415 COLL VENOUS BLD VENIPUNCTURE: CPT

## 2024-03-19 PROCEDURE — 80048 BASIC METABOLIC PNL TOTAL CA: CPT

## 2024-03-19 RX ORDER — BUPROPION HYDROCHLORIDE 150 MG/1
1 TABLET ORAL DAILY
COMMUNITY
Start: 2024-03-15

## 2024-03-19 RX ORDER — CARVEDILOL 25 MG/1
25 TABLET ORAL 2 TIMES DAILY WITH MEALS
Qty: 90 TABLET | Refills: 3 | Status: SHIPPED | OUTPATIENT
Start: 2024-03-19 | End: 2025-03-14

## 2024-03-19 RX ORDER — CLOPIDOGREL BISULFATE 75 MG/1
75 TABLET ORAL DAILY
Qty: 90 TABLET | Refills: 3 | Status: SHIPPED | OUTPATIENT
Start: 2024-03-19 | End: 2025-03-14

## 2024-03-19 RX ORDER — BUPROPION HYDROCHLORIDE 75 MG/1
75 TABLET ORAL 2 TIMES DAILY
COMMUNITY
End: 2024-03-19

## 2024-04-02 ENCOUNTER — OFFICE VISIT (OUTPATIENT)
Dept: OBSTETRICS AND GYNECOLOGY | Facility: CLINIC | Age: 44
End: 2024-04-02
Payer: MEDICAID

## 2024-04-02 VITALS
WEIGHT: 191.4 LBS | SYSTOLIC BLOOD PRESSURE: 110 MMHG | DIASTOLIC BLOOD PRESSURE: 68 MMHG | HEIGHT: 69 IN | BODY MASS INDEX: 28.35 KG/M2

## 2024-04-02 DIAGNOSIS — N93.9 ABNORMAL UTERINE BLEEDING (AUB): Primary | ICD-10-CM

## 2024-04-02 PROCEDURE — 99213 OFFICE O/P EST LOW 20 MIN: CPT | Performed by: OBSTETRICS & GYNECOLOGY

## 2024-04-02 PROCEDURE — 3074F SYST BP LT 130 MM HG: CPT | Performed by: OBSTETRICS & GYNECOLOGY

## 2024-04-02 PROCEDURE — 3078F DIAST BP <80 MM HG: CPT | Performed by: OBSTETRICS & GYNECOLOGY

## 2024-04-02 NOTE — PROGRESS NOTES
Subjective   Chief Complaint   Patient presents with    Follow-up     Follow up for TVS Today     Leigh Negron is a 44 y.o. year old .  Patient's last menstrual period was 2024 (approximate).  She presents to be seen because of right ovarian cyst 4 cm noted on CAT scan.  Patient said significant cardiac involvement due to fibromuscular dysplasia and.  See chart.  CT done for this evaluation noted the cyst.  She has had 1 cycle since the serene ablation in November this was occurring after CT with contrast..     OTHER COMPLAINTS:  Nothing else    The following portions of the patient's history were reviewed and updated as appropriate:She  has a past medical history of ADHD (attention deficit hyperactivity disorder) (10/2002), Allergic (2002), Anemia, Anxiety (10/2006), Coronary artery disease (2023), Depression (2018), Diverticulitis (), Diverticulitis of colon (2023), Diverticulosis (2023), Exposure to sexually transmitted disease (STD), Headache, History of anemia, History of body piercing, History of ECG (2016), History of hypertension, History of kidney infection, History of migraine, Hyperlipidemia, Hypertension (), Low back pain (2016), Migraine (10/05/2023), NSTEMI, initial episode of care (2023), Obesity (2019), PMS (premenstrual syndrome), PONV (postoperative nausea and vomiting) (), Prehypertension, Recurrent genital herpes, Scoliosis (2008), Urinary tract infection (), Varicella (), and Visual impairment (2002).  She does not have any pertinent problems on file.  She  has a past surgical history that includes  section; [place] tooth extraction; Forearm fracture surgery; Umbilical hernia repair; Breast surgery (2016); Cosmetic surgery (2016); Spine surgery (2020); Breast Augmentation (2016); Tubal Sterilization (N/A, 2023); Cardiac catheterization (N/A, 2023); Coronary stent placement  (12/23/2023); Tubal ligation (11/20/2023); and Colonoscopy (06/23/2023).  Her family history includes Arthritis in her father, mother, paternal uncle, and another family member; Breast cancer in her paternal grandmother; Cancer in her father; Diabetes in her mother; Drug abuse in her father; Hyperlipidemia in her father and mother; Hypertension in her father; Mental illness in her father; Migraines in her father; Obesity in her father and mother; Osteoporosis in her mother; Stroke in her father.  She  reports that she has been smoking electronic cigarette and cigarettes. She started smoking about 3 years ago. She has been exposed to tobacco smoke. She has never used smokeless tobacco. She reports current alcohol use of about 1.0 standard drink of alcohol per week. She reports that she does not use drugs.  Current Outpatient Medications   Medication Sig Dispense Refill    buPROPion XL (WELLBUTRIN XL) 150 MG 24 hr tablet Take 1 tablet by mouth Daily.      carvedilol (Coreg) 25 MG tablet Take 1 tablet by mouth 2 (Two) Times a Day With Meals for 360 days. 90 tablet 3    clopidogrel (PLAVIX) 75 MG tablet Take 1 tablet by mouth Daily for 360 days. 90 tablet 3    cyclobenzaprine (FLEXERIL) 5 MG tablet Take 1 tablet by mouth At Night As Needed for Muscle Spasms. 30 tablet 0    fluticasone (FLONASE) 50 MCG/ACT nasal spray       valACYclovir (VALTREX) 500 MG tablet TAKE 1 TABLET BY MOUTH DAILY 102 tablet 2     No current facility-administered medications for this visit.     Current Outpatient Medications on File Prior to Visit   Medication Sig    buPROPion XL (WELLBUTRIN XL) 150 MG 24 hr tablet Take 1 tablet by mouth Daily.    carvedilol (Coreg) 25 MG tablet Take 1 tablet by mouth 2 (Two) Times a Day With Meals for 360 days.    clopidogrel (PLAVIX) 75 MG tablet Take 1 tablet by mouth Daily for 360 days.    cyclobenzaprine (FLEXERIL) 5 MG tablet Take 1 tablet by mouth At Night As Needed for Muscle Spasms.    fluticasone  "(FLONASE) 50 MCG/ACT nasal spray     valACYclovir (VALTREX) 500 MG tablet TAKE 1 TABLET BY MOUTH DAILY    [DISCONTINUED] butalbital-acetaminophen-caffeine (FIORICET, ESGIC) -40 MG per tablet Take 1 tablet by mouth Every 6 (Six) Hours As Needed for Headache.    [DISCONTINUED] chlorthalidone (HYGROTON) 25 MG tablet Take 1 tablet by mouth Daily.    [DISCONTINUED] escitalopram (LEXAPRO) 5 MG tablet Take 1 tablet by mouth Daily. (Patient not taking: Reported on 3/19/2024)     No current facility-administered medications on file prior to visit.     She is allergic to augmentin [amoxicillin-pot clavulanate] and bactrim [sulfamethoxazole-trimethoprim].    Social History    Tobacco Use      Smoking status: Every Day        Types: Electronic Cigarette, Cigarettes        Start date: 4/1/2021        Passive exposure: Past      Smokeless tobacco: Never      Tobacco comments: Not sure when i quit exactly. I never felt particularly addicted so i just quit.    Review of Systems  Consitutional POS: nothing reported    NEG: anorexia or night sweats   Gastointestinal POS: nothing reported    NEG: bloating, change in bowel habits, melena, or reflux symptoms   Genitourinary POS: nothing reported    NEG: dysuria or hematuria   Integument POS: nothing reported    NEG: moles that are changing in size, shape, color or rashes   Breast POS: nothing reported    NEG: persistent breast lump, skin dimpling, or nipple discharge         Respiratory: negative  Cardiovascular: negative  GYN:   See HPI          Objective   /68   Ht 175.3 cm (69\")   Wt 86.8 kg (191 lb 6.4 oz)   LMP 03/26/2024 (Approximate)   BMI 28.26 kg/m²     General:  well developed; well nourished  no acute distress   Skin:  No suspicious lesions seen   Thyroid: not examined   Lungs:  breathing is unlabored   Heart:  Not performed.   Breasts:  Not performed.   Abdomen: Not performed.   Pelvis: Not performed.     Psychiatric: Alert and oriented ×3, mood and affect " appropriate  HEENT: Atraumatic, normocephalic, normal scleral icterus  Extremities: 2+ pulses bilaterally, no edema      Lab Review   No data reviewed    Imaging   CT of abdomen/pelvis report   Transvaginal sonogram today shows a anteverted uterus 182 cm³.  Small fibroids intramural and 2.6 cm subserous on the left.  This is unchanged from prior imaging.  There is a simple left ovarian cyst 3.6 cm.  Normal right ovary.  No solid masses or free fluid.     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  Keep an eye on bleeding.  Hopefully with time the ablation will work better and better.  Her endometrium is reassuring today.      No orders of the defined types were placed in this encounter.         This note was electronically signed.      April 2, 2024

## 2024-04-14 ENCOUNTER — TELEPHONE (OUTPATIENT)
Dept: FAMILY MEDICINE CLINIC | Facility: CLINIC | Age: 44
End: 2024-04-14

## 2024-04-14 DIAGNOSIS — M54.59 MECHANICAL LOW BACK PAIN: ICD-10-CM

## 2024-04-15 RX ORDER — ATORVASTATIN CALCIUM 80 MG/1
80 TABLET, FILM COATED ORAL DAILY
Qty: 90 TABLET | Refills: 3 | Status: SHIPPED | OUTPATIENT
Start: 2024-04-15

## 2024-04-16 ENCOUNTER — E-VISIT (OUTPATIENT)
Dept: ADMINISTRATIVE | Facility: OTHER | Age: 44
End: 2024-04-16
Payer: MEDICAID

## 2024-04-16 NOTE — E-VISIT ESCALATED
Chief Complaint: Heartburn or acid reflux   Patient was shown the following escalation message:   Some conditions need a visit with a healthcare provider   Symptoms that cause weight loss may suggest a more serious condition. You should speak with a provider to get care.   ----------   Patient Interview Transcript:   Which of these symptoms do you have?    Pain in the middle of the upper stomach area (1)    Regurgitation, or movement of stomach acid or undigested food up into the throat or mouth    Nausea   Not selected:    Heartburn, or a burning feeling behind the breastbone (2)   Do any of these describe the pain in the middle of your upper stomach? Select all that apply.    It feels better when I sit up or bend forward   Not selected:    It spreads to my back    _It comes on suddenly and reaches fullest intensity within 10 to 20 minutes _    It spreads to my right shoulder    It spreads to the area between my shoulder blades    It comes along with heavy sweating    It usually happens after a fatty meal    It reaches fullest intensity within 30 to 60 minutes, and then it starts to fade    None of the above   How intense is the pain in the middle of your upper stomach? Select one.    It makes it hard for me to move or breathe   Not selected:    I only notice it when I pay attention to it    It's uncomfortable, but I can still do regular daily tasks    It stops me from doing some regular daily tasks   In the last week, how many days have you had pain in your upper stomach? Select one.    2 or 3   Not selected:    1    4 to 7   In the last week, how many days have you had regurgitation? Regurgitation happens when stomach acid or undigested food moves up into the throat or mouth. Select one.    1   Not selected:    2 or 3    4 to 7   In the last week, how many days have you had nausea? Select one.    4 to 7   Not selected:    1    2 or 3   In the last week, how many days did you have trouble sleeping because of  heartburn or regurgitation? Select one.    4 to 7   Not selected:    0    1    2 or 3   In the last week, how many days did you take antacids for heartburn or regurgitation? Antacids include medications such as Tums, Rolaids, Maalox, and Pepto-Bismol. Antacids do not include daily acid-suppressing medications such as Prilosec (omeprazole), Prevacid (lansoprazole), Nexium (esomeprazole), or Pepcid (famotidine). Select one.    0   Not selected:    1    2 or 3    4 to 7   Are your symptoms constant, or do they come and go? Select one.    Come and go   Not selected:    Constant   How long have you had this current episode of symptoms? Select one.    More than 3 months   Not selected:    Less than 2 weeks    2 to 4 weeks    1 to 3 months   Is this the first time you've had these kinds of symptoms? Select one.    No   Not selected:    Yes   Do any of these make your symptoms worse? Select all that apply.    Empty stomach    Full stomach    Large meals or overeating   Not selected:    Stress    Chocolate    Caffeine    Alcohol    Carbonated drinks, such as soda or sparkling water    Acidic foods, such as tomatoes and oranges    Spicy foods    Lying down    Eating within 2 to 3 hours of bedtime    None of the above   Have you recently had any of these symptoms? Select all that apply.    Loss of appetite    Unexplained or unintentional weight loss    Feeling full after eating only a small amount   Not selected:    Pain with swallowing    Difficulty swallowing, or feeling as if food is stuck in your throat or chest    None of the above   ----------   Medical history   Medical history data does not currently exist for this patient.

## 2024-04-18 RX ORDER — CYCLOBENZAPRINE HCL 5 MG
5 TABLET ORAL NIGHTLY PRN
Qty: 30 TABLET | Refills: 0 | Status: SHIPPED | OUTPATIENT
Start: 2024-04-18

## 2024-04-18 NOTE — TELEPHONE ENCOUNTER
Informed pt, she stated she spoke to her cardiologist prior to requesting the medication to make sure it was okay for her to take the medication.

## 2024-04-18 NOTE — TELEPHONE ENCOUNTER
Refill sent in. Please make pt is aware that this medication can lower blood pressure and her cardiology team needs to know all her current medications.

## 2024-06-18 ENCOUNTER — OFFICE VISIT (OUTPATIENT)
Dept: CARDIOLOGY | Facility: CLINIC | Age: 44
End: 2024-06-18

## 2024-06-18 VITALS
DIASTOLIC BLOOD PRESSURE: 82 MMHG | HEIGHT: 69 IN | HEART RATE: 74 BPM | OXYGEN SATURATION: 98 % | BODY MASS INDEX: 29.18 KG/M2 | SYSTOLIC BLOOD PRESSURE: 130 MMHG | WEIGHT: 197 LBS

## 2024-06-18 DIAGNOSIS — I77.3 FIBROMUSCULAR DYSPLASIA: Chronic | ICD-10-CM

## 2024-06-18 DIAGNOSIS — I25.42 SPONTANEOUS DISSECTION OF CORONARY ARTERY: Primary | Chronic | ICD-10-CM

## 2024-06-18 DIAGNOSIS — I10 ESSENTIAL HYPERTENSION: Chronic | ICD-10-CM

## 2024-06-18 RX ORDER — ASPIRIN 81 MG/1
81 TABLET, CHEWABLE ORAL DAILY
COMMUNITY

## 2024-06-18 RX ORDER — BUPROPION HYDROCHLORIDE 300 MG/1
300 TABLET ORAL EVERY MORNING
COMMUNITY
Start: 2024-04-30

## 2024-06-18 RX ORDER — CHLORTHALIDONE 25 MG/1
1 TABLET ORAL DAILY
COMMUNITY
Start: 2024-04-14

## 2024-06-18 NOTE — PROGRESS NOTES
[facility] Cardiology Office Follow Up Note    Leigh Negron  [id number]  2024    Primary Care Provider: Marlen Navarrete MD    Chief Complaint   Patient presents with    Follow-up    Hypertension       Subjective     History of Present Illness:   Leigh Negron is a 44 y.o. female with  SCAD, NSTEMI, hypertension, and fibromuscular dysplasia  who presents to the Cardiology Clinic for regular follow-up.  During her last visit, we increased her carvedilol to 25 twice daily.  She really has no acute complaints today.  Unfortunately, she missed her appointment with neurosurgery.  Says she has no symptoms that concern her.  She had only 1 episode of chest pain since her last visit but overall, has been doing quite well.  Even got on the stair machine and felt okay doing that type of exercise.  She did run out of chlorthalidone over the past 4 weeks and has been only on carvedilol.  Reports her blood pressure readings are in the 120s over 80s regularly.    Past Cardiac Testin. Last Coronary Angio: 23  SCAD involving the large OM1  Extreme mid to distal LAD tortuosity suggest previous SCAD  Successful PCI x4 of LCX       2. Last Echo: 23    Left ventricular systolic function is normal. Estimated left ventricular EF = 56% Left ventricular ejection fraction appears to be 56 - 60%.    Left ventricular diastolic function was normal.    Estimated right ventricular systolic pressure from tricuspid regurgitation is normal (<35 mmHg).     3. Prior Stress Testing: none     4. Prior Holter Monitor: none    Review of Systems:  Review of Systems   Constitutional: Negative.    Respiratory: Negative.     Cardiovascular: Negative.    Gastrointestinal: Negative.    Musculoskeletal: Negative.    Neurological: Negative.        I have reviewed and/or updated the patient's past medical, past surgical, family, social history, problem list and allergies as appropriate.       Current  "Outpatient Medications:     aspirin 81 MG chewable tablet, Chew 1 tablet Daily., Disp: , Rfl:     atorvastatin (LIPITOR) 80 MG tablet, Take 1 tablet by mouth Daily., Disp: 90 tablet, Rfl: 3    buPROPion XL (WELLBUTRIN XL) 300 MG 24 hr tablet, Take 1 tablet by mouth Every Morning., Disp: , Rfl:     carvedilol (Coreg) 25 MG tablet, Take 1 tablet by mouth 2 (Two) Times a Day With Meals for 360 days., Disp: 90 tablet, Rfl: 3    chlorthalidone (HYGROTON) 25 MG tablet, Take 1 tablet by mouth Daily., Disp: , Rfl:     clopidogrel (PLAVIX) 75 MG tablet, Take 1 tablet by mouth Daily for 360 days., Disp: 90 tablet, Rfl: 3    cyclobenzaprine (FLEXERIL) 5 MG tablet, Take 1 tablet by mouth At Night As Needed for Muscle Spasms., Disp: 30 tablet, Rfl: 0    fluticasone (FLONASE) 50 MCG/ACT nasal spray, , Disp: , Rfl:     valACYclovir (VALTREX) 500 MG tablet, TAKE 1 TABLET BY MOUTH DAILY, Disp: 102 tablet, Rfl: 2    buPROPion XL (WELLBUTRIN XL) 150 MG 24 hr tablet, Take 1 tablet by mouth Daily. (Patient not taking: Reported on 6/18/2024), Disp: , Rfl:        Objective     Vitals:  Vitals:    06/18/24 1255   BP: 130/82   BP Location: Right arm   Patient Position: Sitting   Cuff Size: Adult   Pulse: 74   SpO2: 98%   Weight: 89.4 kg (197 lb)   Height: 175.3 cm (69\")       Physical Exam:  Vitals reviewed.   Constitutional:       Appearance: Healthy appearance. Not in distress.   Cardiovascular:      Normal rate. Regular rhythm. Normal S1. Normal S2.       Murmurs: There is no murmur.      No gallop.  No click. No rub.   Pulses:     Intact distal pulses.   Edema:     Peripheral edema absent.   Skin:     General: Skin is warm and dry.         Results Review:   I reviewed the patient's new clinical results.  I reviewed the patient's new imaging results and agree with the interpretation.  I reviewed the patient's other test results and agree with the interpretation    Procedures        Assessment & Plan   Diagnoses and all orders for this " visit:    1. Spontaneous dissection of coronary artery (Primary)  H/o NSTEMI 2/2 SCAD.   s/p PCI x 4 to OM.  Extreme mid to distal LAD tortuosity suggest prior silent SCAD events. Echo preserved EF.    Stable and asymptomatic.  -- Continue DAPT (aspirin+Plavix) for 1 year post PCI (through 12/23/2024)  -- Continue beta-blocker and high intensity statin   -- Encouraged her to start gradually increasing degree of aerobic exercise in her daily routine     2. Essential hypertension  Goal <130/80.  Borderline today.  Has been off the chlorthalidone for about a month.  -- Would like to hold off restarting and focus on weight loss and dietary modification to drop her blood pressure that way.  -- Advised that she return in about 2 weeks for BP check with BP log.  If not at goal, will consider restarting chlorthalidone versus ARB  -- Continue carvedilol for now     3. Fibromuscular dysplasia  Head to toe CTA remarkable for beading involving the proximal right ICA, right M1 and questionable involvement of left M1 segment and bilateral proximal PCA and basilar artery.  Otherwise, no evidence of FMD in the chest, abdomen, pelvis, or extremities.  Incidental finding of hepatic steatosis and 4 cm ovarian cyst.  Mild renal artery calcification.  -- Placing referral to neurosurgery again to establish care  -- Has been evaluated by GYN for cyst  -- Needs regular labs with primary care                 Plan   Orders Placed This Encounter   Procedures    Ambulatory Referral to Neurosurgery     Referral Priority:   Routine     Referral Type:   Consultation     Referral Reason:   Specialty Services Required     Requested Specialty:   Neurosurgery     Number of Visits Requested:   1     Medications:    Preventative Cardiology:   Tobacco Cessation: N/A  Obstructive Sleep Apnea Screening: N/A  AAA Screening: N/A  Peripheral Arterial Disease Screening: N/A       Follow Up:   Return in about 6 months (around 12/18/2024).    Thank you for  allowing me to participate in the care of your patient. Please to not hesitate to contact me with additional questions or concerns.     Geovany Fraire MD  06/18/2024  08:08 EDT

## 2024-06-20 DIAGNOSIS — M54.59 MECHANICAL LOW BACK PAIN: ICD-10-CM

## 2024-06-20 RX ORDER — CYCLOBENZAPRINE HCL 5 MG
5 TABLET ORAL NIGHTLY PRN
Qty: 30 TABLET | Refills: 0 | Status: SHIPPED | OUTPATIENT
Start: 2024-06-20

## 2024-07-15 ENCOUNTER — OFFICE VISIT (OUTPATIENT)
Dept: NEUROSURGERY | Facility: CLINIC | Age: 44
End: 2024-07-15
Payer: MEDICAID

## 2024-07-15 VITALS
DIASTOLIC BLOOD PRESSURE: 100 MMHG | TEMPERATURE: 97.5 F | SYSTOLIC BLOOD PRESSURE: 130 MMHG | OXYGEN SATURATION: 100 % | HEIGHT: 69 IN | WEIGHT: 198.4 LBS | HEART RATE: 70 BPM | BODY MASS INDEX: 29.38 KG/M2

## 2024-07-15 DIAGNOSIS — I77.3 FIBROMUSCULAR DYSPLASIA: Primary | Chronic | ICD-10-CM

## 2024-07-15 PROCEDURE — 3080F DIAST BP >= 90 MM HG: CPT

## 2024-07-15 PROCEDURE — 3075F SYST BP GE 130 - 139MM HG: CPT

## 2024-07-15 PROCEDURE — 1160F RVW MEDS BY RX/DR IN RCRD: CPT

## 2024-07-15 PROCEDURE — 99204 OFFICE O/P NEW MOD 45 MIN: CPT

## 2024-07-15 PROCEDURE — 1159F MED LIST DOCD IN RCRD: CPT

## 2024-07-15 NOTE — PROGRESS NOTES
Name: Leigh Negron    : 1980     MRN: [id number]     Primary Care Provider: Marlen Navarrete MD    Chief Complaint  new patient (Fibromuscular dysplasia)      History of Present Illness:  Leigh Negron is a 44 y.o. female with a history of spontaneous coronary artery dissection, NSTEMI, hypertension, and fibromuscular dysplasia.  She denies any stroke/TIA-like symptoms.  She is currently on Plavix/aspirin dual antiplatelet therapy and tolerating this well.  She presents to the neurointerventional clinic for consultation of fibromuscular dysplasia.    PMHX  Allergies:  Allergies   Allergen Reactions    Augmentin [Amoxicillin-Pot Clavulanate] Nausea And Vomiting    Bactrim [Sulfamethoxazole-Trimethoprim] GI Intolerance     Medications    Current Outpatient Medications:     aspirin 81 MG chewable tablet, Chew 1 tablet Daily., Disp: , Rfl:     atorvastatin (LIPITOR) 80 MG tablet, Take 1 tablet by mouth Daily., Disp: 90 tablet, Rfl: 3    buPROPion XL (WELLBUTRIN XL) 300 MG 24 hr tablet, Take 1 tablet by mouth Every Morning., Disp: , Rfl:     carvedilol (Coreg) 25 MG tablet, Take 1 tablet by mouth 2 (Two) Times a Day With Meals for 360 days., Disp: 90 tablet, Rfl: 3    chlorthalidone (HYGROTON) 25 MG tablet, Take 1 tablet by mouth Daily., Disp: , Rfl:     clopidogrel (PLAVIX) 75 MG tablet, Take 1 tablet by mouth Daily for 360 days., Disp: 90 tablet, Rfl: 3    cyclobenzaprine (FLEXERIL) 5 MG tablet, Take 1 tablet by mouth At Night As Needed for Muscle Spasms., Disp: 30 tablet, Rfl: 0    fluticasone (FLONASE) 50 MCG/ACT nasal spray, , Disp: , Rfl:     valACYclovir (VALTREX) 500 MG tablet, TAKE 1 TABLET BY MOUTH DAILY, Disp: 102 tablet, Rfl: 2    buPROPion XL (WELLBUTRIN XL) 150 MG 24 hr tablet, Take 1 tablet by mouth Daily., Disp: , Rfl:   Past Medical History:  Past Medical History:   Diagnosis Date    ADHD (attention deficit hyperactivity disorder) 10/2002    Allergic 2002    Had prick test done  several years ago. Didn't explain sympto    Anemia     Anxiety 10/2006    I have a stressful job    Coronary artery disease 2023    Heart attack    Depression 2018    Diverticulitis     Diverticulitis of colon 2023    Diverticulosis 2023    Diagnosed at [facility].    Exposure to sexually transmitted disease (STD)     Headache     History of anemia     History of body piercing     History of ECG 2016    History of hypertension     History of kidney infection     History of migraine     Hyperlipidemia     Hypertension     no medication required    Low back pain 2016    MRI showed bulging discs at L4 & L5. 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    Urinary tract infection     Varicella 1986    Visual impairment 2002    I wear glasses     Past Surgical History:  Past Surgical History:   Procedure Laterality Date    BREAST AUGMENTATION  2016    BREAST SURGERY  2016    Breast augmentation    CARDIAC CATHETERIZATION N/A 2023    Procedure: Coronary angiography;  Surgeon: Jacinto Daly MD;  Location: [facility] CATH INVASIVE LOCATION;  Service: Cardiology;  Laterality: N/A;     SECTION      COLONOSCOPY  2023    CORONARY STENT PLACEMENT  2023    COSMETIC SURGERY  2016    Breast augmentation    FOREARM FRACTURE SURGERY      SPINE SURGERY  2020    Discectomy L5/S1    TUBAL ABDOMINAL LIGATION  2023    Also ablation    TUBAL COAGULATION LAPAROSCOPIC N/A 2023    Procedure: TUBAL FALLOPE FILSHIE CLIPPING LAPAROSCOPIC, hysteroscopy, dilatation curettage, removal of ParaGard IUD,cerene ablation;  Surgeon: Steven Avila MD;  Location: [facility] OR;  Service:  Obstetrics/Gynecology;  Laterality: N/A;    UMBILICAL HERNIA REPAIR      with mesh    WISDOM TOOTH EXTRACTION       Social Hx:  Social History     Tobacco Use    Smoking status: Every Day     Types: Electronic Cigarette, Cigarettes     Start date: 4/1/2021     Passive exposure: Past    Smokeless tobacco: Never    Tobacco comments:     Not sure when i quit exactly. I never felt particularly addicted so i just quit.   Vaping Use    Vaping status: Every Day    Substances: Nicotine   Substance Use Topics    Alcohol use: Yes     Alcohol/week: 1.0 standard drink of alcohol     Types: 1 Shots of liquor per week     Comment: Occasional alcohol use    Drug use: No     Family Hx:  Family History   Problem Relation Age of Onset    Hyperlipidemia Mother     Obesity Mother     Osteoporosis Mother     Arthritis Mother         Arthritis in foot    Diabetes Mother         She is currently pre diabetic    Stroke Father     Arthritis Father     Hyperlipidemia Father     Hypertension Father     Mental illness Father     Migraines Father     Obesity Father     Cancer Father         Squamous cell carcinoma    Drug abuse Father         Lifetime marijuana use    Breast cancer Paternal Grandmother     Arthritis Paternal Uncle         Rheumatoid arthritis    Arthritis Other      Review of Systems:        Review of Systems   Constitutional:  Negative for activity change, appetite change, chills, diaphoresis, fatigue, fever and unexpected weight change.   HENT:  Negative for congestion, dental problem, drooling, ear discharge, ear pain, facial swelling, hearing loss, mouth sores, nosebleeds, postnasal drip, rhinorrhea, sinus pressure, sinus pain, sneezing, sore throat, tinnitus, trouble swallowing and voice change.    Eyes:  Negative for photophobia, pain, discharge, redness, itching and visual disturbance.   Respiratory:  Negative for apnea, cough, choking, chest tightness, shortness of breath, wheezing and stridor.    Cardiovascular:   Negative for chest pain, palpitations and leg swelling.   Gastrointestinal:  Negative for abdominal distention, abdominal pain, anal bleeding, blood in stool, constipation, diarrhea, nausea, rectal pain and vomiting.   Endocrine: Negative for cold intolerance, heat intolerance, polydipsia, polyphagia and polyuria.   Genitourinary:  Negative for decreased urine volume, difficulty urinating, dysuria, enuresis, flank pain, frequency, genital sores, hematuria and urgency.   Musculoskeletal:  Negative for arthralgias, back pain, gait problem, joint swelling, myalgias, neck pain and neck stiffness.   Skin:  Negative for color change, pallor, rash and wound.   Allergic/Immunologic: Negative for environmental allergies, food allergies and immunocompromised state.   Neurological:  Negative for dizziness, tremors, seizures, syncope, facial asymmetry, speech difficulty, weakness, light-headedness, numbness and headaches.   Hematological:  Negative for adenopathy. Does not bruise/bleed easily.   Psychiatric/Behavioral:  Negative for agitation, behavioral problems, confusion, decreased concentration, dysphoric mood, hallucinations, self-injury, sleep disturbance and suicidal ideas. The patient is not nervous/anxious and is not hyperactive.         Review of  Imaging: Patient was reviewed with Dr. Longoria.  CTA head and neck dated 1/30/2024 from [facility] was reviewed along with the corresponding radiologic reports.  There is reported bleeding involving the proximal right ICA, right M1 and questionable involvement of the left M1 and bilateral proximal posterior PCAs and basilar artery.  I do not appreciate any significant stenosis involving the vasculature of the head/neck.    Vital Signs:   Vitals:    07/15/24 1335   BP: 130/100   Pulse: 70   Temp: 97.5 °F (36.4 °C)   SpO2: 100%        Physical Exam  General: Well-developed, well-nourished, in no acute distress.    Cardiovascular: Regular rate and rhythm.  No  carotid bruits.    Neuro: Alert and oriented x 3.  Nonfocal neurologic exam.  Speech is clear.  No facial droop or pronator drift.  I do not appreciate any gross visual field deficits.  EOMs intact bilaterally.  Strength is symmetric in upper and lower extremities. Ambulates unassisted.       Social History    Tobacco Use      Smoking status: Every Day        Types: Electronic Cigarette, Cigarettes        Start date: 4/1/2021        Passive exposure: Past      Smokeless tobacco: Never      Tobacco comments: Not sure when i quit exactly. I never felt particularly addicted so i just quit.       Tobacco Use: High Risk (7/15/2024)    Patient History     Smoking Tobacco Use: Every Day     Smokeless Tobacco Use: Never     Passive Exposure: Past           Assessment/Plan    Diagnoses and all orders for this visit:    1. Fibromuscular dysplasia (Primary)  -     CT Angiogram Head; Future  -     CT Angiogram Neck; Future         Leigh Negron is a 44 y.o. female with a history of SCAD, NSTEMI, hypertension, and fibromuscular dysplasia.  CTA of the head/neck demonstrated potential findings that would be consistent with FMD, although I do not appreciate any significant stenosis involving the vasculature of the head/neck.  Treatment options including conservative therapy with continued medical management versus diagnostic catheter angiogram were discussed with Ms. Negron, including risks and benefits, and she would like to continue with medical management, which I believe is certainly reasonable.  She will continue on the Plavix/aspirin dual antiplatelet therapy regimen, and we will plan to see her back in 12 months time with a CTA of the head/neck to ensure stability and exclude progression of disease that might necessitate further treatment/intervention.  In the interim, Ms. Negron will contact our office/911 if she experiences any new stroke/TIA-like symptoms.    Patient encouraged to contact us if she has any changes  in her condition or any concerns.    Any copied data from previous notes included in the (1) HPI, (2) PE, (3) MDM and/or Assessment and Plan has been reviewed and accurate as of 07/15/24.    Ally Lin PA-C  07/15/24

## 2024-08-21 ENCOUNTER — OFFICE VISIT (OUTPATIENT)
Dept: FAMILY MEDICINE CLINIC | Facility: CLINIC | Age: 44
End: 2024-08-21
Payer: MEDICAID

## 2024-08-21 VITALS
HEART RATE: 70 BPM | OXYGEN SATURATION: 97 % | WEIGHT: 198.6 LBS | SYSTOLIC BLOOD PRESSURE: 124 MMHG | BODY MASS INDEX: 29.41 KG/M2 | DIASTOLIC BLOOD PRESSURE: 76 MMHG | HEIGHT: 69 IN

## 2024-08-21 DIAGNOSIS — G89.29 CHRONIC SI JOINT PAIN: Primary | ICD-10-CM

## 2024-08-21 DIAGNOSIS — Z12.31 ENCOUNTER FOR SCREENING MAMMOGRAM FOR MALIGNANT NEOPLASM OF BREAST: ICD-10-CM

## 2024-08-21 DIAGNOSIS — M53.3 CHRONIC SI JOINT PAIN: Primary | ICD-10-CM

## 2024-08-21 DIAGNOSIS — F90.9 ADULT ADHD: ICD-10-CM

## 2024-08-21 DIAGNOSIS — M25.50 POLYARTHRALGIA: ICD-10-CM

## 2024-08-21 RX ORDER — VILOXAZINE HYDROCHLORIDE 200 MG/1
1 CAPSULE, EXTENDED RELEASE ORAL DAILY
Qty: 30 CAPSULE | Refills: 1 | Status: SHIPPED | OUTPATIENT
Start: 2024-08-21

## 2024-08-21 NOTE — PROGRESS NOTES
Subjective   Leigh Negron is a 44 y.o. female.     History of Present Illness  Answers submitted by the patient for this visit:  Other (Submitted on 8/21/2024)  Please describe your symptoms.: Always tired, excessive sleeping, can't focus  Have you had these symptoms before?: Yes  How long have you been having these symptoms?: Greater than 2 weeks  Please list any medications you are currently taking for this condition.: Wellbutrin  Please describe any probable cause for these symptoms. : My ADHD diagnosis  Primary Reason for Visit (Submitted on 8/21/2024)  What is the primary reason for your visit?: Other    Previous dx of ADHD. No longer able to be treated with stimulants due to h/o SCAD. Has had recent eval by psychiatry. Suggested possible use of Quelbree 200 mg. She reports significant functional decompensation since having to d/c stimulant tx.    She also c/o bilateral SI joint pain. Previous eval by ortho, Dr. Parrish. Considering SI joint procedure. Previous L-DDD with L spine surgery 4 years ago.    The following portions of the patient's history were reviewed and updated as appropriate: allergies, current medications, past family history, past medical history, past social history, past surgical history, and problem list.    Review of Systems   Constitutional:  Negative for fatigue, fever and unexpected weight change.   HENT:  Negative for congestion, mouth sores, rhinorrhea, sore throat and trouble swallowing.    Eyes:  Negative for visual disturbance.   Respiratory:  Negative for cough, shortness of breath and wheezing.    Cardiovascular:  Negative for chest pain, palpitations and leg swelling.   Gastrointestinal:  Negative for abdominal pain, blood in stool, constipation, diarrhea and vomiting.   Endocrine: Negative for polydipsia and polyuria.   Genitourinary:  Negative for dysuria, frequency, hematuria and urgency.   Musculoskeletal:  Positive for arthralgias, back pain and myalgias.   Skin:   Negative for rash and wound.   Neurological:  Negative for dizziness, tremors, syncope, weakness, numbness and headaches.   Hematological:  Negative for adenopathy. Bruises/bleeds easily.   Psychiatric/Behavioral:  Positive for agitation, decreased concentration and sleep disturbance. Negative for confusion, dysphoric mood and suicidal ideas. The patient is nervous/anxious and is hyperactive.    Pt's previous ROS reviewed and updated as indicated.       Objective   Vitals:    08/21/24 0953   BP: 124/76   Pulse: 70   SpO2: 97%     Body mass index is 29.33 kg/m².      08/21/24  0953   Weight: 90.1 kg (198 lb 9.6 oz)       Physical Exam  Vitals and nursing note reviewed.   Constitutional:       General: She is not in acute distress.     Appearance: She is overweight. She is not ill-appearing.   HENT:      Head: Atraumatic.      Mouth/Throat:      Mouth: Mucous membranes are moist.   Eyes:      General: No scleral icterus.     Conjunctiva/sclera: Conjunctivae normal.   Neck:      Thyroid: No thyroid mass.   Cardiovascular:      Rate and Rhythm: Normal rate and regular rhythm.      Pulses: Normal pulses.      Heart sounds: Normal heart sounds.   Pulmonary:      Effort: Pulmonary effort is normal.      Breath sounds: Normal breath sounds.   Musculoskeletal:      Right lower leg: No edema.      Left lower leg: No edema.   Lymphadenopathy:      Cervical: No cervical adenopathy.   Skin:     General: Skin is warm and dry.      Coloration: Skin is not jaundiced or pale.      Findings: No bruising or rash.   Neurological:      Mental Status: She is alert and oriented to person, place, and time. Mental status is at baseline.      Sensory: Sensation is intact.      Motor: Motor function is intact. No tremor.      Gait: Gait is intact.   Psychiatric:         Mood and Affect: Mood and affect normal.         Speech: Speech is rapid and pressured (mildly) and tangential (mildly).         Behavior: Behavior normal. Behavior is  cooperative.         Thought Content: Thought content normal.         Cognition and Memory: Cognition normal.     Pt's previous physical exam reviewed and updated as indicated.      Assessment & Plan   Diagnoses and all orders for this visit:    1. Chronic SI joint pain (Primary)  -     XR sacroiliac joints 1 or 2 vw; Future  -     Cyclic Citrul Peptide Antibody, IgG / IgA  -     C-reactive Protein  -     Sedimentation Rate  -     Rheumatoid Factor    2. Polyarthralgia  -     Cyclic Citrul Peptide Antibody, IgG / IgA  -     C-reactive Protein  -     Sedimentation Rate  -     Uric Acid  -     SINDY With / DsDNA, RNP, Sjogrens A / B, Smith  -     Rheumatoid Factor    3. Adult ADHD  -     Viloxazine HCl ER (Qelbree) 200 MG capsule sustained-release 24 hr; Take 1 capsule by mouth Daily.  Dispense: 30 capsule; Refill: 1    4. Encounter for screening mammogram for malignant neoplasm of breast  -     Mammo Screening Digital Tomosynthesis Bilateral With CAD; Future       I have reviewed risks/benefits and potential side effects of various treatment options for ADHD. Pt voices understanding and wishes to proceed with Porfirio. F/u with psychiatry as scheduled.    Further eval of chronic joint pain, SI pain. F/u with Dr. Parrish as scheduled.    F/u in 6-8 weeks, f/u sooner as needed/insturcted.  I will contact patient regarding test results and provide instructions regarding any necessary changes in plan of care.  Patient was encouraged to keep me informed of any acute changes, lack of improvement, or any new concerning symptoms.  Pt is aware of reasons to seek emergent care.  Patient voiced understanding of all instructions and denied further questions.    Please note that portions of this note may have been completed with a voice recognition program.

## 2024-08-22 LAB
ANA SER QL: NEGATIVE
CCP IGA+IGG SERPL IA-ACNC: 4 UNITS (ref 0–19)
CRP SERPL-MCNC: <0.3 MG/DL (ref 0–0.5)
ERYTHROCYTE [SEDIMENTATION RATE] IN BLOOD BY WESTERGREN METHOD: <1 MM/HR (ref 0–20)
RHEUMATOID FACT SERPL-ACNC: <10 IU/ML
URATE SERPL-MCNC: 2.6 MG/DL (ref 2.4–5.7)

## 2024-10-29 DIAGNOSIS — M54.59 MECHANICAL LOW BACK PAIN: ICD-10-CM

## 2024-10-29 RX ORDER — CYCLOBENZAPRINE HCL 5 MG
5 TABLET ORAL NIGHTLY PRN
Qty: 30 TABLET | Refills: 0 | Status: SHIPPED | OUTPATIENT
Start: 2024-10-29

## 2024-10-29 RX ORDER — VALACYCLOVIR HYDROCHLORIDE 500 MG/1
500 TABLET, FILM COATED ORAL DAILY
Qty: 102 TABLET | Refills: 2 | Status: SHIPPED | OUTPATIENT
Start: 2024-10-29

## 2024-11-15 ENCOUNTER — OFFICE VISIT (OUTPATIENT)
Dept: FAMILY MEDICINE CLINIC | Facility: CLINIC | Age: 44
End: 2024-11-15
Payer: MEDICAID

## 2024-11-15 VITALS
SYSTOLIC BLOOD PRESSURE: 118 MMHG | HEIGHT: 69 IN | RESPIRATION RATE: 16 BRPM | WEIGHT: 200.8 LBS | BODY MASS INDEX: 29.74 KG/M2 | TEMPERATURE: 97.9 F | HEART RATE: 71 BPM | DIASTOLIC BLOOD PRESSURE: 76 MMHG | OXYGEN SATURATION: 97 %

## 2024-11-15 DIAGNOSIS — S29.012A STRAIN OF RIGHT LATISSIMUS DORSI MUSCLE: ICD-10-CM

## 2024-11-15 DIAGNOSIS — R07.81 RIB PAIN ON RIGHT SIDE: Primary | ICD-10-CM

## 2024-11-15 DIAGNOSIS — S29.011A MUSCLE STRAIN OF CHEST WALL, INITIAL ENCOUNTER: ICD-10-CM

## 2024-11-15 PROCEDURE — 3074F SYST BP LT 130 MM HG: CPT | Performed by: STUDENT IN AN ORGANIZED HEALTH CARE EDUCATION/TRAINING PROGRAM

## 2024-11-15 PROCEDURE — 99213 OFFICE O/P EST LOW 20 MIN: CPT | Performed by: STUDENT IN AN ORGANIZED HEALTH CARE EDUCATION/TRAINING PROGRAM

## 2024-11-15 PROCEDURE — 1126F AMNT PAIN NOTED NONE PRSNT: CPT | Performed by: STUDENT IN AN ORGANIZED HEALTH CARE EDUCATION/TRAINING PROGRAM

## 2024-11-15 PROCEDURE — 3078F DIAST BP <80 MM HG: CPT | Performed by: STUDENT IN AN ORGANIZED HEALTH CARE EDUCATION/TRAINING PROGRAM

## 2024-11-15 RX ORDER — LORATADINE AND PSEUDOEPHEDRINE SULFATE 10; 240 MG/1; MG/1
TABLET, FILM COATED, EXTENDED RELEASE ORAL
COMMUNITY
Start: 2024-08-27

## 2024-11-15 NOTE — PROGRESS NOTES
Follow Up Office Visit      Date: 11/15/2024   Patient Name: Leigh Negron  : 1980   MRN: [id number]     Chief Complaint:    Chief Complaint   Patient presents with    Rib Injury     Pt is here for pain under rib on right side          History of Present Illness: Leigh Negron is a 44 y.o. female who is here today for roughly 3-month history of pain underneath her right rib and right back.  Patient describes pain as an achy pain on her right side/back underneath her right arm.  Patient reports she has had deep tissue massages.  Patient denies known injury.  Patient denies chest pain or shortness of breath.    Subjective     I have reviewed the patients family history, social history, past medical history, past surgical history and have updated it as appropriate.     Medications:     Current Outpatient Medications:     Allergy Relief/Nasal Decongest  MG per 24 hr tablet, TAKE 1 TABLET BY MOUTH ONCE DAILY AS NEEDED FOR CONGESTION, Disp: , Rfl:     aspirin 81 MG chewable tablet, Chew 1 tablet Daily., Disp: , Rfl:     atorvastatin (LIPITOR) 80 MG tablet, Take 1 tablet by mouth Daily., Disp: 90 tablet, Rfl: 3    carvedilol (Coreg) 25 MG tablet, Take 1 tablet by mouth 2 (Two) Times a Day With Meals for 360 days., Disp: 90 tablet, Rfl: 3    clopidogrel (PLAVIX) 75 MG tablet, Take 1 tablet by mouth Daily for 360 days., Disp: 90 tablet, Rfl: 3    cyclobenzaprine (FLEXERIL) 5 MG tablet, Take 1 tablet by mouth At Night As Needed for Muscle Spasms., Disp: 30 tablet, Rfl: 0    fluticasone (FLONASE) 50 MCG/ACT nasal spray, , Disp: , Rfl:     valACYclovir (VALTREX) 500 MG tablet, Take 1 tablet by mouth Daily., Disp: 102 tablet, Rfl: 2    Viloxazine HCl ER (Qelbree) 200 MG capsule sustained-release 24 hr, Take 1 capsule by mouth Daily., Disp: 30 capsule, Rfl: 1    Allergies:   Allergies   Allergen Reactions    Augmentin [Amoxicillin-Pot Clavulanate] Nausea And Vomiting    Bactrim  "[Sulfamethoxazole-Trimethoprim] GI Intolerance       Objective     Physical Exam:     Vital Signs:   Vitals:    11/15/24 1132   BP: 118/76   Pulse: 71   Resp: 16   Temp: 97.9 °F (36.6 °C)   TempSrc: Axillary   SpO2: 97%   Weight: 91.1 kg (200 lb 12.8 oz)   Height: 175.3 cm (69\")     Body mass index is 29.65 kg/m².          Physical Exam     General:  Well appearing adult female in no acute distress. Alert and oriented. Vitals reviewed and are within normal limits.  Head/ENT: Atraumatic.   Neck: Anatomy appears symmetrical.   Cardiac: Regular rate and rhythm to auscultation.   Pulmonary: Lungs are clear to auscultation bilaterally.  MSK: Pain to palpation on patients right side near her back. There is a tense area/muscle spasm appreciated in area consistent with right latissimus dorsi.   Integumentary/Skin: Skin appears unremarkable from observable skin surfaces.   Neurological: Normal gait and speech.  Behavioral/Psych: Patient behavior/demeanor appears consistent with reported age. Patient is pleasant with normal affect today.      Procedures      Assessment / Plan      1. Strain of right latissimus dorsi muscle  Patient presents with roughly 2 month history of right side/back pain. No chest pain/SOB reported. Patient is afebrile and hemodynamically stable on room air.  Physical assessment shows nontoxic-appearing female.  Physical assessment is significant for pain to palpation on patients right side near her back. There is a tense area/muscle spasm appreciated in area consistent with right latissimus dorsi.  CXR shows no evidence of osseous abnormality or cardiopulmonary process on my own assessment.  I suspect patient symptoms are related to right latissimus dorsi strain.  Have recommended alleviating factors such as Tylenol and her previous prescription of Flexeril.  Have placed order for physical therapy.  Will await official radiological interpretation of CXR.  Patient to follow-up with her regular primary " care provider.  Patient to return to care or emergency setting with development of chest pain or shortness of breath.  - Ambulatory Referral to Physical Therapy for Evaluation & Treatment    2. Muscle strain of chest wall, initial encounter  See strain of right latissimus dorsi.  - Ambulatory Referral to Physical Therapy for Evaluation & Treatment    3. Rib pain on right side  See strain of right latissimus dorsi.  - XR Chest PA & Lateral (In Office)  - Ambulatory Referral to Physical Therapy for Evaluation & Treatment       Follow Up:   Return in about 5 weeks (around 12/20/2024).      MD CHAYO [facility]  [address] [place] [address] [address] [place] [address]  Fax [phone]  Phone [phone]

## 2024-11-20 DIAGNOSIS — I10 ESSENTIAL HYPERTENSION: Chronic | ICD-10-CM

## 2024-11-21 RX ORDER — CARVEDILOL 25 MG/1
25 TABLET ORAL 2 TIMES DAILY WITH MEALS
Qty: 90 TABLET | Refills: 3 | Status: SHIPPED | OUTPATIENT
Start: 2024-11-21 | End: 2025-11-16

## 2024-12-05 ENCOUNTER — HOSPITAL ENCOUNTER (EMERGENCY)
Facility: HOSPITAL | Age: 44
Discharge: HOME | End: 2024-12-05
Attending: STUDENT IN AN ORGANIZED HEALTH CARE EDUCATION/TRAINING PROGRAM
Payer: MEDICAID

## 2024-12-05 ENCOUNTER — APPOINTMENT (OUTPATIENT)
Dept: CT IMAGING | Facility: HOSPITAL | Age: 44
End: 2024-12-05
Payer: MEDICAID

## 2024-12-05 VITALS
TEMPERATURE: 98.1 F | BODY MASS INDEX: 29.74 KG/M2 | HEART RATE: 86 BPM | OXYGEN SATURATION: 96 % | WEIGHT: 200.8 LBS | DIASTOLIC BLOOD PRESSURE: 71 MMHG | SYSTOLIC BLOOD PRESSURE: 111 MMHG | RESPIRATION RATE: 18 BRPM | HEIGHT: 69 IN

## 2024-12-05 DIAGNOSIS — K57.20 DIVERTICULITIS OF LARGE INTESTINE WITH PERFORATION WITHOUT ABSCESS OR BLEEDING: Primary | ICD-10-CM

## 2024-12-05 LAB
ALBUMIN SERPL-MCNC: 4.1 G/DL (ref 3.5–5.2)
ALBUMIN/GLOB SERPL: 1.6 G/DL
ALP SERPL-CCNC: 61 U/L (ref 39–117)
ALT SERPL W P-5'-P-CCNC: 23 U/L (ref 1–33)
ANION GAP SERPL CALCULATED.3IONS-SCNC: 16.3 MMOL/L (ref 5–15)
AST SERPL-CCNC: 22 U/L (ref 1–32)
BACTERIA UR QL AUTO: ABNORMAL /HPF
BASOPHILS # BLD AUTO: 0.04 10*3/MM3 (ref 0–0.2)
BASOPHILS NFR BLD AUTO: 0.5 % (ref 0–1.5)
BILIRUB SERPL-MCNC: 0.6 MG/DL (ref 0–1.2)
BILIRUB UR QL STRIP: NEGATIVE
BUN SERPL-MCNC: 11 MG/DL (ref 6–20)
BUN/CREAT SERPL: 14.3 (ref 7–25)
CALCIUM SPEC-SCNC: 9 MG/DL (ref 8.6–10.5)
CHLORIDE SERPL-SCNC: 98 MMOL/L (ref 98–107)
CLARITY UR: CLEAR
CO2 SERPL-SCNC: 23.7 MMOL/L (ref 22–29)
COLOR UR: YELLOW
CREAT SERPL-MCNC: 0.77 MG/DL (ref 0.57–1)
DEPRECATED RDW RBC AUTO: 39.8 FL (ref 37–54)
EGFRCR SERPLBLD CKD-EPI 2021: 98.3 ML/MIN/1.73
EOSINOPHIL # BLD AUTO: 0.07 10*3/MM3 (ref 0–0.4)
EOSINOPHIL NFR BLD AUTO: 0.8 % (ref 0.3–6.2)
ERYTHROCYTE [DISTWIDTH] IN BLOOD BY AUTOMATED COUNT: 12.1 % (ref 12.3–15.4)
GLOBULIN UR ELPH-MCNC: 2.6 GM/DL
GLUCOSE SERPL-MCNC: 93 MG/DL (ref 65–99)
GLUCOSE UR STRIP-MCNC: NEGATIVE MG/DL
HCT VFR BLD AUTO: 42 % (ref 34–46.6)
HGB BLD-MCNC: 14.2 G/DL (ref 12–15.9)
HGB UR QL STRIP.AUTO: ABNORMAL
HYALINE CASTS UR QL AUTO: ABNORMAL /LPF
IMM GRANULOCYTES # BLD AUTO: 0.03 10*3/MM3 (ref 0–0.05)
IMM GRANULOCYTES NFR BLD AUTO: 0.4 % (ref 0–0.5)
KETONES UR QL STRIP: NEGATIVE
LEUKOCYTE ESTERASE UR QL STRIP.AUTO: NEGATIVE
LYMPHOCYTES # BLD AUTO: 1.13 10*3/MM3 (ref 0.7–3.1)
LYMPHOCYTES NFR BLD AUTO: 13.5 % (ref 19.6–45.3)
MCH RBC QN AUTO: 30.3 PG (ref 26.6–33)
MCHC RBC AUTO-ENTMCNC: 33.8 G/DL (ref 31.5–35.7)
MCV RBC AUTO: 89.6 FL (ref 79–97)
MONOCYTES # BLD AUTO: 0.55 10*3/MM3 (ref 0.1–0.9)
MONOCYTES NFR BLD AUTO: 6.5 % (ref 5–12)
NEUTROPHILS NFR BLD AUTO: 6.58 10*3/MM3 (ref 1.7–7)
NEUTROPHILS NFR BLD AUTO: 78.3 % (ref 42.7–76)
NITRITE UR QL STRIP: NEGATIVE
NRBC BLD AUTO-RTO: 0 /100 WBC (ref 0–0.2)
PH UR STRIP.AUTO: 7.5 [PH] (ref 5–8)
PLATELET # BLD AUTO: 237 10*3/MM3 (ref 140–450)
PMV BLD AUTO: 11.8 FL (ref 6–12)
POTASSIUM SERPL-SCNC: 4.6 MMOL/L (ref 3.5–5.2)
PROT SERPL-MCNC: 6.7 G/DL (ref 6–8.5)
PROT UR QL STRIP: NEGATIVE
RBC # BLD AUTO: 4.69 10*6/MM3 (ref 3.77–5.28)
RBC # UR STRIP: ABNORMAL /HPF
REF LAB TEST METHOD: ABNORMAL
SODIUM SERPL-SCNC: 138 MMOL/L (ref 136–145)
SP GR UR STRIP: 1.01 (ref 1–1.03)
SQUAMOUS #/AREA URNS HPF: ABNORMAL /HPF
UROBILINOGEN UR QL STRIP: ABNORMAL
WBC # UR STRIP: ABNORMAL /HPF
WBC NRBC COR # BLD AUTO: 8.4 10*3/MM3 (ref 3.4–10.8)

## 2024-12-05 PROCEDURE — 80053 COMPREHEN METABOLIC PANEL: CPT | Performed by: NURSE PRACTITIONER

## 2024-12-05 PROCEDURE — 74177 CT ABD & PELVIS W/CONTRAST: CPT

## 2024-12-05 PROCEDURE — 99285 EMERGENCY DEPT VISIT HI MDM: CPT | Performed by: STUDENT IN AN ORGANIZED HEALTH CARE EDUCATION/TRAINING PROGRAM

## 2024-12-05 PROCEDURE — 81001 URINALYSIS AUTO W/SCOPE: CPT | Performed by: NURSE PRACTITIONER

## 2024-12-05 PROCEDURE — 85025 COMPLETE CBC W/AUTO DIFF WBC: CPT | Performed by: NURSE PRACTITIONER

## 2024-12-05 PROCEDURE — 25510000001 IOPAMIDOL 61 % SOLUTION: Performed by: STUDENT IN AN ORGANIZED HEALTH CARE EDUCATION/TRAINING PROGRAM

## 2024-12-05 RX ORDER — IOPAMIDOL 612 MG/ML
100 INJECTION, SOLUTION INTRAVASCULAR
Status: COMPLETED | OUTPATIENT
Start: 2024-12-05 | End: 2024-12-05

## 2024-12-05 RX ORDER — CIPROFLOXACIN 500 MG/1
500 TABLET, FILM COATED ORAL 2 TIMES DAILY
Qty: 20 TABLET | Refills: 0 | Status: SHIPPED | OUTPATIENT
Start: 2024-12-05 | End: 2024-12-15

## 2024-12-05 RX ORDER — METRONIDAZOLE 500 MG/1
500 TABLET ORAL 3 TIMES DAILY
Qty: 30 TABLET | Refills: 0 | Status: SHIPPED | OUTPATIENT
Start: 2024-12-05 | End: 2024-12-15

## 2024-12-05 RX ADMIN — IOPAMIDOL 100 ML: 612 INJECTION, SOLUTION INTRAVENOUS at 12:00

## 2024-12-05 NOTE — ED PROVIDER NOTES
Pt Name: Leigh Negron  MRN: [id number]  : 1980  Date of Encounter: 2024    PCP: Marlen Navarrete MD      Subjective    History of Present Illness:    Chief Complaint: Left-sided abdominal pain    History of Present Illness: Leigh Negron is a 44 y.o. female who presents to the ER complaining of left-sided abdominal pain that started Tuesday.  Patient states that since Tuesday she has had increasing sharp stabbing left lower abdominal pain that is spreading across lower abdomen to her right side.  She also states that she feels the pain is stabbing straight through into her back.  She reports increased urinary frequency and pain that intermittently radiates into her left thigh.patient reports that she has a history of diverticulitis and has been constipated over the past couple of days. pain is described as Sharp, Tearing, and  Radiates to left back, left thigh and right lower abdomen   Patient rates pain as a 7 on a ten scale.    Triage Vitals:    ED Triage Vitals [24 0954]   Temp Heart Rate Resp BP SpO2   98.1 °F (36.7 °C) 77 18 (!) 89/59 100 %      Temp src Heart Rate Source Patient Position BP Location FiO2 (%)   Oral Monitor Sitting Left arm --       Nurses Notes reviewed and agree, including vitals, allergies, social history and prior medical history.     Augmentin [amoxicillin-pot clavulanate] and Bactrim [sulfamethoxazole-trimethoprim]    Past Medical History:   Diagnosis Date    ADHD (attention deficit hyperactivity disorder) 10/2002    Allergic 2002    Had prick test done several years ago. Didn't explain sympto    Anemia     Anxiety 10/2006    I have a stressful job    Coronary artery disease 2023    Heart attack    Depression 2018    Diverticulitis     Diverticulitis of colon 2023    Diverticulosis 2023    Diagnosed at [facility].    Exposure to sexually transmitted disease (STD)     Headache     History of anemia     History of body  piercing     History of ECG 2016    History of hypertension     History of kidney infection     History of migraine     Hyperlipidemia     Hypertension 2006    no medication required    Low back pain 2016    MRI showed bulging discs at L4 & L5. 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    Urinary tract infection     Varicella     Visual impairment 2002    I wear glasses       Past Surgical History:   Procedure Laterality Date    BREAST AUGMENTATION  2016    BREAST SURGERY  2016    Breast augmentation    CARDIAC CATHETERIZATION N/A 2023    Procedure: Coronary angiography;  Surgeon: Jacinto Daly MD;  Location: [facility] CATH INVASIVE LOCATION;  Service: Cardiology;  Laterality: N/A;     SECTION      COLONOSCOPY  2023    CORONARY STENT PLACEMENT  2023    COSMETIC SURGERY  2016    Breast augmentation    FOREARM FRACTURE SURGERY      SPINE SURGERY  2020    Discectomy L5/S1    TUBAL ABDOMINAL LIGATION  2023    Also ablation    TUBAL COAGULATION LAPAROSCOPIC N/A 2023    Procedure: TUBAL FALLOPE FILSHIE CLIPPING LAPAROSCOPIC, hysteroscopy, dilatation curettage, removal of ParaGard IUD,cerene ablation;  Surgeon: Steven Avila MD;  Location: [facility] OR;  Service: Obstetrics/Gynecology;  Laterality: N/A;    UMBILICAL HERNIA REPAIR      with mesh    WISDOM TOOTH EXTRACTION         Social History     Socioeconomic History    Marital status:    Tobacco Use    Smoking status: Never     Passive exposure: Never    Smokeless tobacco: Never    Tobacco comments:     Not sure when i quit exactly. I never felt particularly addicted so i just quit.   Vaping Use    Vaping status: Every  Day    Substances: Nicotine   Substance and Sexual Activity    Alcohol use: Yes     Alcohol/week: 1.0 standard drink of alcohol     Types: 1 Shots of liquor per week     Comment: Occasional alcohol use    Drug use: No    Sexual activity: Not Currently     Partners: Male     Birth control/protection: I.U.D.     Comment: I'm interested in getting my tubes tied       Family History   Problem Relation Age of Onset    Hyperlipidemia Mother     Obesity Mother     Osteoporosis Mother     Arthritis Mother         Arthritis in foot    Diabetes Mother         She is currently pre diabetic    Stroke Father     Arthritis Father     Hyperlipidemia Father     Hypertension Father     Mental illness Father     Migraines Father     Obesity Father     Cancer Father         Squamous cell carcinoma    Drug abuse Father         Lifetime marijuana use    Breast cancer Paternal Grandmother     Arthritis Paternal Uncle         Rheumatoid arthritis    Arthritis Other        REVIEW OF SYSTEMS:     All systems reviewed and not pertinent unless noted.    Review of Systems   Gastrointestinal:  Positive for abdominal pain and constipation.   All other systems reviewed and are negative.      Objective    Physical Exam  Vitals and nursing note reviewed.   Constitutional:       Appearance: Normal appearance.   HENT:      Head: Normocephalic and atraumatic.   Eyes:      Extraocular Movements: Extraocular movements intact.      Pupils: Pupils are equal, round, and reactive to light.   Cardiovascular:      Rate and Rhythm: Normal rate and regular rhythm.      Pulses: Normal pulses.      Heart sounds: Normal heart sounds.   Pulmonary:      Effort: Pulmonary effort is normal.      Breath sounds: Normal breath sounds.   Abdominal:      General: Abdomen is flat. Bowel sounds are normal.      Palpations: Abdomen is soft.      Tenderness:  in the left lower quadrant   Musculoskeletal:      Cervical back: Normal range of motion and neck supple.       Comments: Left thoracic tenderness   Skin:     Capillary Refill: Capillary refill takes less than 2 seconds.   Neurological:      General: No focal deficit present.      Mental Status: She is alert and oriented to person, place, and time. Mental status is at baseline.      GCS: GCS eye subscore is 4. GCS verbal subscore is 5. GCS motor subscore is 6.      Sensory: Sensation is intact.      Motor: Motor function is intact.      Gait: Gait is intact.   Psychiatric:         Attention and Perception: Attention and perception normal.         Mood and Affect: Mood and affect normal.         Speech: Speech normal.         Behavior: Behavior normal. Behavior is cooperative.                           Procedures    ED Course:    No orders to display            Orders placed during this visit:    Orders Placed This Encounter   Procedures    CT Abdomen Pelvis With Contrast    Comprehensive Metabolic Panel    Urinalysis With Culture If Indicated - Urine, Clean Catch    CBC Auto Differential    Urinalysis, Microscopic Only - Urine, Clean Catch    CBC & Differential       LAB Results:    Lab Results (last 24 hours)       Procedure Component Value Units Date/Time    Urinalysis With Culture If Indicated - Urine, Clean Catch [[id number]]  (Abnormal) Collected: 12/05/24 1035    Specimen: Urine, Clean Catch Updated: 12/05/24 1058     Color, UA Yellow     Appearance, UA Clear     pH, UA 7.5     Specific Gravity, UA 1.008     Glucose, UA Negative     Ketones, UA Negative     Bilirubin, UA Negative     Blood, UA Large (3+)     Protein, UA Negative     Leuk Esterase, UA Negative     Nitrite, UA Negative     Urobilinogen, UA 0.2 E.U./dL    Narrative:      In absence of clinical symptoms, the presence of pyuria, bacteria, and/or nitrites on the urinalysis result does not correlate with infection.    Urinalysis, Microscopic Only - Urine, Clean Catch [[id number]]  (Abnormal) Collected: 12/05/24 1035    Specimen: Urine, Clean Catch Updated:  12/05/24 1113     RBC, UA 0-2 /HPF      WBC, UA 0-2 /HPF      Comment: Urine culture not indicated.        Bacteria, UA Trace /HPF      Squamous Epithelial Cells, UA 3-6 /HPF      Hyaline Casts, UA 0-2 /LPF      Methodology Manual Light Microscopy    CBC & Differential [[id number]]  (Abnormal) Collected: 12/05/24 1040    Specimen: Blood Updated: 12/05/24 1049    Narrative:      The following orders were created for panel order CBC & Differential.  Procedure                               Abnormality         Status                     ---------                               -----------         ------                     CBC Auto Differential[[id number]]        Abnormal            Final result                 Please view results for these tests on the individual orders.    Comprehensive Metabolic Panel [[id number]]  (Abnormal) Collected: 12/05/24 1040    Specimen: Blood Updated: 12/05/24 1111     Glucose 93 mg/dL      BUN 11 mg/dL      Creatinine 0.77 mg/dL      Sodium 138 mmol/L      Potassium 4.6 mmol/L      Comment: Slight hemolysis detected by analyzer. Result may be falsely elevated.        Chloride 98 mmol/L      CO2 23.7 mmol/L      Calcium 9.0 mg/dL      Total Protein 6.7 g/dL      Albumin 4.1 g/dL      ALT (SGPT) 23 U/L      AST (SGOT) 22 U/L      Comment: Slight hemolysis detected by analyzer. Result may be falsely elevated.        Alkaline Phosphatase 61 U/L      Total Bilirubin 0.6 mg/dL      Globulin 2.6 gm/dL      A/G Ratio 1.6 g/dL      BUN/Creatinine Ratio 14.3     Anion Gap 16.3 mmol/L      eGFR 98.3 mL/min/1.73     Narrative:      GFR Normal >60  Chronic Kidney Disease <60  Kidney Failure <15      CBC Auto Differential [[id number]]  (Abnormal) Collected: 12/05/24 1040    Specimen: Blood Updated: 12/05/24 1049     WBC 8.40 10*3/mm3      RBC 4.69 10*6/mm3      Hemoglobin 14.2 g/dL      Hematocrit 42.0 %      MCV 89.6 fL      MCH 30.3 pg      MCHC 33.8 g/dL      RDW 12.1 %      RDW-SD 39.8 fl      MPV 11.8  fL      Platelets 237 10*3/mm3      Neutrophil % 78.3 %      Lymphocyte % 13.5 %      Monocyte % 6.5 %      Eosinophil % 0.8 %      Basophil % 0.5 %      Immature Grans % 0.4 %      Neutrophils, Absolute 6.58 10*3/mm3      Lymphocytes, Absolute 1.13 10*3/mm3      Monocytes, Absolute 0.55 10*3/mm3      Eosinophils, Absolute 0.07 10*3/mm3      Basophils, Absolute 0.04 10*3/mm3      Immature Grans, Absolute 0.03 10*3/mm3      nRBC 0.0 /100 WBC              If labs were ordered, I have independently reviewed the results and considered them in the diagnosis and treatment plan for the patient    RADIOLOGY    CT Abdomen Pelvis With Contrast    Result Date: 12/5/2024   PROCEDURE: CT ABDOMEN PELVIS W CONTRAST-  HISTORY: Left-sided abdominal pain, possible kidney stone  COMPARISON: February 12, 2024..  PROCEDURE: The patient was injected with IV contrast. Axial images were obtained from the lung bases to the pubic symphysis by computed tomography. This study was performed with techniques to keep radiation doses as low as reasonably achievable, (ALARA). Individualized dose reduction techniques using automated exposure control or adjustment of mA and/or kV according to the patient size were employed.  FINDINGS:  ABDOMEN: The lung bases are clear. The heart is proper size. The liver is homogenous with no focal abnormality. Gallbladder present with no CT visible stones. The spleen is unremarkable. No adrenal mass is present. The pancreas is unremarkable. The kidneys are unremarkable, without evidence of mass or hydronephrosis. The aorta is proper caliber. There is no free fluid or adenopathy.  PELVIS: The GI tract demonstrates moderate wall thickening of the proximal and mid sigmoid colon with significant infiltration of the adjacent fat. No definite extraluminal air identified. Findings are consistent with diverticulitis. No definite rim-enhancing fluid collection to suggest abscess identified. There is a small amount of free  fluid in the pelvis. There are air-fluid levels in nondistended small bowel, consider ileus. Uterus is midline. The appendix is not identified. The urinary bladder is mostly collapsed, likely explaining wall thickening.      Impression: Sigmoid diverticulitis with no free air or associated abscess identified.    CTDI: 11.96 mGy DLP:592.19 mGy.cm  This report was signed and finalized on 12/5/2024 12:45 PM by Shelly Toussaint MD.        If I have ordered, I have independently reviewed the above noted radiographic studies.  Please see the radiologist dictation for the official interpretation    Medications given to patient in the ER    Medications   iopamidol (ISOVUE-300) 61 % injection 100 mL (100 mL Intravenous Given 12/5/24 1200)       AS OF 13:08 EST VITALS:    BP - 111/71  HR - 86  TEMP - 98.1 °F (36.7 °C) (Oral)  O2 SATS - 96%         Shared Decision Making: After my consideration of the clinical presentation and laboratory/radiology studies obtained, I have discussed the findings with the patient/patient representative who is in agreement with the treatment plan and final disposition. Risks and benefits of discharge and/or observation admission were discussed.  Final disposition of the patient will be discharged home.  Patient is requested to follow-up with primary care provider and specialist in 1 week following final discharge.      Medical Decision Making  Leigh Negron is a 44 y.o. female who presents to the ER complaining of left-sided abdominal pain that started Tuesday.  Patient states that since Tuesday she has had increasing sharp stabbing left lower abdominal pain that is spreading across lower abdomen to her right side.  She also states that she feels the pain is stabbing straight through into her back.  She reports increased urinary frequency and pain that intermittently radiates into her left thigh.patient reports that she has a history of diverticulitis and has been constipated over the past  couple of days. pain is described as Sharp, Tearing, and  Radiates to left back, left thigh and right lower abdomen   Patient rates pain as a 7 on a ten scale.  DDX: includes but is not limited to: Diverticulosis, diverticulitis, intestinal ischemia, kidney stone, gastroenteritis    Problems Addressed:  Diverticulitis of large intestine with perforation without abscess or bleeding: complicated acute illness or injury    Amount and/or Complexity of Data Reviewed  Labs: ordered. Decision-making details documented in ED Course.     Details: I have personally reviewed and documented all results  Radiology: ordered. Decision-making details documented in ED Course.     Details: I have personally reviewed and documented all results  Discussion of management or test interpretation with external provider(s): Discussed assessment, treatment and plan with ER attending    Risk  Prescription drug management.  Risk Details: I have discussed with patient the finding of the test preformed today. Patient has been diagnosed with diverticulitis of large intestine without perforation abscess or bleeding and will be discharged home. Advised on return precautions the importance of close follow-up with primary care provider.  Strict return precautions have been given and patient verbalizes understanding        Final diagnoses:   Diverticulitis of large intestine with perforation without abscess or bleeding       Please note that portions of this document were completed using voice recognition dictation software.       Filippo Field, APRN  12/05/24 [id number]

## 2024-12-11 ENCOUNTER — OFFICE VISIT (OUTPATIENT)
Dept: FAMILY MEDICINE CLINIC | Facility: CLINIC | Age: 44
End: 2024-12-11
Payer: MEDICAID

## 2024-12-11 VITALS
DIASTOLIC BLOOD PRESSURE: 80 MMHG | BODY MASS INDEX: 29.77 KG/M2 | HEIGHT: 69 IN | HEART RATE: 65 BPM | RESPIRATION RATE: 14 BRPM | WEIGHT: 201 LBS | SYSTOLIC BLOOD PRESSURE: 112 MMHG | TEMPERATURE: 97.6 F | OXYGEN SATURATION: 92 %

## 2024-12-11 DIAGNOSIS — R10.9 RIGHT FLANK PAIN: ICD-10-CM

## 2024-12-11 DIAGNOSIS — K57.32 DIVERTICULITIS OF LARGE INTESTINE WITHOUT PERFORATION OR ABSCESS WITHOUT BLEEDING: Primary | ICD-10-CM

## 2024-12-11 DIAGNOSIS — R31.29 MICROSCOPIC HEMATURIA: ICD-10-CM

## 2024-12-11 DIAGNOSIS — M54.59 MECHANICAL LOW BACK PAIN: ICD-10-CM

## 2024-12-11 PROCEDURE — 1126F AMNT PAIN NOTED NONE PRSNT: CPT | Performed by: FAMILY MEDICINE

## 2024-12-11 PROCEDURE — 1160F RVW MEDS BY RX/DR IN RCRD: CPT | Performed by: FAMILY MEDICINE

## 2024-12-11 PROCEDURE — 99214 OFFICE O/P EST MOD 30 MIN: CPT | Performed by: FAMILY MEDICINE

## 2024-12-11 PROCEDURE — 3079F DIAST BP 80-89 MM HG: CPT | Performed by: FAMILY MEDICINE

## 2024-12-11 PROCEDURE — 3074F SYST BP LT 130 MM HG: CPT | Performed by: FAMILY MEDICINE

## 2024-12-11 PROCEDURE — 1159F MED LIST DOCD IN RCRD: CPT | Performed by: FAMILY MEDICINE

## 2024-12-11 RX ORDER — BUPROPION HYDROCHLORIDE 300 MG/1
300 TABLET ORAL EVERY MORNING
COMMUNITY
Start: 2024-11-12

## 2024-12-11 RX ORDER — CYCLOBENZAPRINE HCL 5 MG
5 TABLET ORAL NIGHTLY PRN
Qty: 30 TABLET | Refills: 5 | Status: SHIPPED | OUTPATIENT
Start: 2024-12-11

## 2024-12-11 NOTE — PROGRESS NOTES
Subjective   Leigh Negron is a 44 y.o. female.     Answers submitted by the patient for this visit:  Primary Reason for Visit (Submitted on 12/7/2024)  What is the primary reason for your visit?: Abdominal Pain    Abdominal Pain  This is a recurrent problem. The current episode started in the past 7 days. The onset quality is sudden. The problem occurs constantly. The most recent episode lasted 5 days. The problem has been improving. The pain is located in the LLQ and generalized abdominal region. The pain is at a severity of 8/10. The quality of the pain is cramping. The abdominal pain radiates to the RLQ, epigastric region and back. Associated symptoms include belching, constipation, diarrhea, a fever, flatus, frequency, myalgias and nausea. Pertinent negatives include no anorexia, arthralgias, dysuria, headaches, hematochezia, hematuria, melena, vomiting or weight loss. The pain is aggravated by certain positions, coughing, movement and palpation. The pain is relieved by Being still and recumbency. Prior diagnostic workup includes GI consult.     Seen at [facility] ER 12/5/2024 for abd pain. Dx'd with recurrent diverticulitis. Treated with oral abx and referred to GI. Apt with GI not for several weeks.    No fever, no blood in stool. Some mild/moderate LLQ pain.    Now c/o right side/flank pain. Concerned about kidney infection.    Requesting refill of flexeril for chronic LBP, muscle spasm.    The following portions of the patient's history were reviewed and updated as appropriate: allergies, current medications, past family history, past medical history, past social history, past surgical history, and problem list.    Review of Systems   Constitutional:  Positive for fever. Negative for fatigue, unexpected weight change and weight loss.   HENT:  Negative for congestion, mouth sores, rhinorrhea, sore throat and trouble swallowing.    Eyes:  Negative for visual disturbance.   Respiratory:  Negative for cough,  shortness of breath and wheezing.    Cardiovascular:  Negative for chest pain, palpitations and leg swelling.   Gastrointestinal:  Positive for abdominal pain, constipation, diarrhea, flatus and nausea. Negative for anorexia, blood in stool, hematochezia, melena and vomiting.   Endocrine: Negative for polydipsia and polyuria.   Genitourinary:  Positive for flank pain and frequency. Negative for dysuria, hematuria, pelvic pain, urgency and vaginal discharge.   Musculoskeletal:  Positive for back pain and myalgias. Negative for arthralgias.   Skin:  Negative for rash and wound.   Neurological:  Negative for dizziness, tremors, syncope, weakness, numbness and headaches.   Hematological:  Negative for adenopathy. Bruises/bleeds easily.   Psychiatric/Behavioral:  Positive for agitation, decreased concentration and sleep disturbance. Negative for confusion, dysphoric mood and suicidal ideas. The patient is nervous/anxious and is hyperactive.    Pt's previous ROS reviewed and updated as indicated.       Objective   Vitals:    12/11/24 0911   BP: 112/80   Pulse: 65   Resp: 14   Temp: 97.6 °F (36.4 °C)   SpO2: 92%     Body mass index is 29.67 kg/m².      12/11/24  0911   Weight: 91.2 kg (201 lb)       Physical Exam  Vitals and nursing note reviewed.   Constitutional:       General: She is not in acute distress.     Appearance: She is not ill-appearing.   HENT:      Head: Atraumatic.      Mouth/Throat:      Mouth: Mucous membranes are moist.   Eyes:      General: No scleral icterus.     Conjunctiva/sclera: Conjunctivae normal.   Cardiovascular:      Rate and Rhythm: Normal rate and regular rhythm.      Pulses: Normal pulses.      Heart sounds: Normal heart sounds.   Pulmonary:      Effort: Pulmonary effort is normal.      Breath sounds: Normal breath sounds.   Abdominal:      General: Bowel sounds are normal. There is no distension.      Palpations: Abdomen is soft. There is no mass.      Tenderness: There is abdominal  tenderness (moderate) in the left lower quadrant. There is right CVA tenderness (mild). There is no left CVA tenderness, guarding or rebound.   Musculoskeletal:      Right lower leg: No edema.      Left lower leg: No edema.   Skin:     General: Skin is warm and dry.      Coloration: Skin is not jaundiced or pale.      Findings: No rash.   Neurological:      Mental Status: She is alert and oriented to person, place, and time.      Gait: Gait is intact.   Psychiatric:         Behavior: Behavior normal. Behavior is cooperative.         Cognition and Memory: Cognition normal.     Pt's previous physical exam reviewed and updated as indicated.    CT Abdomen Pelvis With Contrast  Result Date: 12/5/2024  Sigmoid diverticulitis with no free air or associated abscess identified.    CTDI: 11.96 mGy DLP:592.19 mGy.cm  This report was signed and finalized on 12/5/2024 12:45 PM by Shelly Toussaint MD.      XR Chest PA & Lateral (In Office)  Result Date: 11/15/2024  No acute cardiopulmonary process.      This report was signed and finalized on 11/15/2024 12:07 PM by Shelly Toussaint MD.      Lab Results   Component Value Date    WBC 8.40 12/05/2024    HGB 14.2 12/05/2024    HCT 42.0 12/05/2024    MCV 89.6 12/05/2024     12/05/2024       Lab Results   Component Value Date    GLUCOSE 93 12/05/2024    BUN 11 12/05/2024    CREATININE 0.77 12/05/2024    EGFRIFNONA 89 02/09/2022    EGFRIFAFRI 108 02/09/2022    BCR 14.3 12/05/2024    K 4.6 12/05/2024    CO2 23.7 12/05/2024    CALCIUM 9.0 12/05/2024    PROTENTOTREF 6.5 01/02/2024    ALBUMIN 4.1 12/05/2024    LABIL2 2.0 01/02/2024    AST 22 12/05/2024    ALT 23 12/05/2024       Lab Results   Component Value Date    HGBA1C 5.00 12/24/2023       Lab Results   Component Value Date    TSH 1.320 02/28/2024         Assessment & Plan   Diagnoses and all orders for this visit:    1. Diverticulitis of large intestine without perforation or abscess without bleeding (Primary)  -     Ambulatory  Referral to General Surgery    2. Microscopic hematuria  -     Urinalysis With Microscopic If Indicated (No Culture) - Urine, Clean Catch  -     Urine Culture - Urine, Urine, Clean Catch    3. Right flank pain  -     Urinalysis With Microscopic If Indicated (No Culture) - Urine, Clean Catch  -     Urine Culture - Urine, Urine, Clean Catch    4. Mechanical low back pain  -     cyclobenzaprine (FLEXERIL) 5 MG tablet; Take 1 tablet by mouth At Night As Needed for Muscle Spasms.  Dispense: 30 tablet; Refill: 5       Complete abx as rx'd by ER.  Consult with general surgery regarding possible need for sigmoidectomy.  R/o UTI as above, currently on cipro.  F/u as scheduled in 2 months for routine physical, f/u sooner as neded/instructed.  I will contact patient regarding test results and provide instructions regarding any necessary changes in plan of care.  Patient was encouraged to keep me informed of any acute changes, lack of improvement, or any new concerning symptoms.  Pt is aware of reasons to seek emergent care.  Patient voiced understanding of all instructions and denied further questions.    Please note that portions of this note may have been completed with a voice recognition program.

## 2024-12-13 LAB
APPEARANCE UR: CLEAR
BACTERIA UR CULT: NO GROWTH
BACTERIA UR CULT: NORMAL
BILIRUB UR QL STRIP: NEGATIVE
COLOR UR: YELLOW
GLUCOSE UR QL STRIP: NEGATIVE
HGB UR QL STRIP: NEGATIVE
KETONES UR QL STRIP: NEGATIVE
LEUKOCYTE ESTERASE UR QL STRIP: NEGATIVE
NITRITE UR QL STRIP: NEGATIVE
PH UR STRIP: 6 [PH] (ref 5–8)
PROT UR QL STRIP: NEGATIVE
SP GR UR STRIP: 1.01 (ref 1–1.03)
UROBILINOGEN UR STRIP-MCNC: NORMAL MG/DL

## 2024-12-16 NOTE — PROGRESS NOTES
[facility] Cardiology Office Follow Up Note    Leigh Negron  [id number]  2024    Primary Care Provider: Marlen Navarrete MD    Chief Complaint   Patient presents with    Follow-up    Spontaneous dissection of coronary artery     Subjective     History of Present Illness:   Leigh Negron is a 44 y.o. female with  SCAD, NSTEMI, hypertension, and fibromuscular dysplasia  who presents to the Cardiology Clinic for follow up of hypertension.  We made no significant changes to her medical therapy during last visit.  Since her last visit, she successfully quit all nicotine.  She was also seen by neurosurgery and decision was to repeat imaging in 1 year.  No intervention is being planned.  Overall, she feels well and has no exertional chest pain or dyspnea.  She plans to start exercising more regularly in the coming months.  She has been off of Adderall.  No orthopnea, PND, or leg swelling.    Past Cardiac Testin. Last Coronary Angio: 23  SCAD involving the large OM1  Extreme mid to distal LAD tortuosity suggest previous SCAD  Successful PCI x4 of LCX       2. Last Echo: 23    Left ventricular systolic function is normal. Estimated left ventricular EF = 56% Left ventricular ejection fraction appears to be 56 - 60%.    Left ventricular diastolic function was normal.    Estimated right ventricular systolic pressure from tricuspid regurgitation is normal (<35 mmHg).     3. Prior Stress Testing: none     4. Prior Holter Monitor: none     Review of Systems:  Review of Systems   Constitutional: Negative.    Respiratory: Negative.     Cardiovascular: Negative.    Gastrointestinal: Negative.    Musculoskeletal: Negative.    Neurological: Negative.      I have reviewed and/or updated the patient's past medical, past surgical, family, social history, problem list and allergies as appropriate.       Current Outpatient Medications:     aspirin 81 MG chewable tablet, Chew 1 tablet  "Daily., Disp: , Rfl:     atorvastatin (LIPITOR) 80 MG tablet, Take 1 tablet by mouth Daily., Disp: 90 tablet, Rfl: 3    buPROPion XL (WELLBUTRIN XL) 300 MG 24 hr tablet, Take 1 tablet by mouth Every Morning., Disp: , Rfl:     carvedilol (Coreg) 25 MG tablet, Take 1 tablet by mouth 2 (Two) Times a Day With Meals for 360 days., Disp: 90 tablet, Rfl: 3    clopidogrel (PLAVIX) 75 MG tablet, Take 1 tablet by mouth Daily for 360 days., Disp: 90 tablet, Rfl: 3    cyclobenzaprine (FLEXERIL) 5 MG tablet, Take 1 tablet by mouth At Night As Needed for Muscle Spasms., Disp: 30 tablet, Rfl: 5    fluticasone (FLONASE) 50 MCG/ACT nasal spray, , Disp: , Rfl:     valACYclovir (VALTREX) 500 MG tablet, Take 1 tablet by mouth Daily., Disp: 102 tablet, Rfl: 2    Viloxazine HCl ER (Qelbree) 200 MG capsule sustained-release 24 hr, Take 1 capsule by mouth Daily., Disp: 30 capsule, Rfl: 1    Allergy Relief/Nasal Decongest  MG per 24 hr tablet, TAKE 1 TABLET BY MOUTH ONCE DAILY AS NEEDED FOR CONGESTION, Disp: , Rfl:        Objective     Vitals:  Vitals:    12/18/24 1310   BP: 110/68   BP Location: Right arm   Patient Position: Sitting   Pulse: 55   SpO2: 99%   Weight: 90.7 kg (200 lb)   Height: 175.3 cm (69.02\")       Physical Exam:  Vitals reviewed.   Constitutional:       Appearance: Healthy appearance. Not in distress.   Cardiovascular:      Normal rate. Regular rhythm. Normal S1. Normal S2.       Murmurs: There is no murmur.      No gallop.  No click. No rub.   Pulses:     Intact distal pulses.   Edema:     Peripheral edema absent.   Skin:     General: Skin is warm and dry.         Results Review:   I reviewed the patient's new clinical results.  I reviewed the patient's new imaging results and agree with the interpretation.  I reviewed the patient's other test results and agree with the interpretation    Procedures        Assessment & Plan   Diagnoses and all orders for this visit:    1. Spontaneous dissection of coronary artery " (Primary)  H/o NSTEMI 2/2 SCAD.  Thought to be, in part, due to chronic prescription stimulant.  s/p PCI x 4 to OM.  Extreme mid to distal LAD tortuosity suggest prior silent SCAD events. Echo preserved EF.    Stable and asymptomatic from this standpoint.  -- Continue Plavix through 12/23/2024 and then can stop as long as okay with neurosurgery (sent message to Ally Lin in Epic chat)  -- Continue aspirin and statin indefinitely  -- Encouraged her to undertake regular aerobic/cardio exercise  -- Regular blood work including lipids with primary care    2. Essential hypertension  Goal <130/80.  Controlled.  Doing well off chlorthalidone.  -- Continue carvedilol     3. Fibromuscular dysplasia  Head to toe CTA remarkable for beading involving the proximal right ICA, right M1 and questionable involvement of left M1 segment and bilateral proximal PCA and basilar artery.  Otherwise, no evidence of FMD in the chest, abdomen, pelvis, or extremities.  Incidental finding of hepatic steatosis and 4 cm ovarian cyst.  Mild renal artery calcification.  Seen by neurosurgery.  No intervention planned.  Plan for repeat imaging next year.  -- Continue regular follow-up with neurosurgery.  -- Continue statin indefinitely          Plan   No orders of the defined types were placed in this encounter.    Medications:    Preventative Cardiology:   Tobacco Cessation: N/A  Obstructive Sleep Apnea Screening: Deffered  AAA Screening: Deffered  Peripheral Arterial Disease Screening: Deffered       Follow Up:   Return in about 1 year (around 12/18/2025).    Thank you for allowing me to participate in the care of your patient. Please to not hesitate to contact me with additional questions or concerns.     Geovany Fraire MD  12/18/2024  12:21 EST

## 2024-12-17 ENCOUNTER — HOSPITAL ENCOUNTER (OUTPATIENT)
Dept: MAMMOGRAPHY | Facility: HOSPITAL | Age: 44
Discharge: HOME | End: 2024-12-17
Admitting: FAMILY MEDICINE
Payer: MEDICAID

## 2024-12-17 DIAGNOSIS — Z12.31 ENCOUNTER FOR SCREENING MAMMOGRAM FOR MALIGNANT NEOPLASM OF BREAST: ICD-10-CM

## 2024-12-17 PROCEDURE — 77067 SCR MAMMO BI INCL CAD: CPT

## 2024-12-17 PROCEDURE — 77063 BREAST TOMOSYNTHESIS BI: CPT

## 2024-12-18 ENCOUNTER — OFFICE VISIT (OUTPATIENT)
Dept: CARDIOLOGY | Facility: CLINIC | Age: 44
End: 2024-12-18
Payer: MEDICAID

## 2024-12-18 VITALS
HEART RATE: 55 BPM | HEIGHT: 69 IN | SYSTOLIC BLOOD PRESSURE: 110 MMHG | BODY MASS INDEX: 29.62 KG/M2 | DIASTOLIC BLOOD PRESSURE: 68 MMHG | WEIGHT: 200 LBS | OXYGEN SATURATION: 99 %

## 2024-12-18 DIAGNOSIS — I77.3 FIBROMUSCULAR DYSPLASIA: Chronic | ICD-10-CM

## 2024-12-18 DIAGNOSIS — I25.42 SPONTANEOUS DISSECTION OF CORONARY ARTERY: Primary | Chronic | ICD-10-CM

## 2024-12-18 DIAGNOSIS — I10 ESSENTIAL HYPERTENSION: Chronic | ICD-10-CM

## 2024-12-18 PROCEDURE — 3078F DIAST BP <80 MM HG: CPT | Performed by: INTERNAL MEDICINE

## 2024-12-18 PROCEDURE — 99214 OFFICE O/P EST MOD 30 MIN: CPT | Performed by: INTERNAL MEDICINE

## 2024-12-18 PROCEDURE — 3074F SYST BP LT 130 MM HG: CPT | Performed by: INTERNAL MEDICINE

## 2024-12-26 NOTE — PROGRESS NOTES
Patient: Leigh Negron    YOB: 1980    Date: 01/02/2025    Primary Care Provider: Marlen Navarrete MD    Chief Complaint   Patient presents with    Abdominal Pain     Diverticulitis        SUBJECTIVE:    History of present illness: Patient states that she was recently treated for diverticulitis.  Over the last 10 years she thinks she may have had 3 or 4 episodes.  Asymptomatic currently.  She does complain of relative constipation mainly.  The following portions of the patient's history were reviewed and updated as appropriate: allergies, current medications, past family history, past medical history, past social history, past surgical history and problem list.      Review of Systems   Constitutional:  Negative for chills, fever and unexpected weight change.   HENT:  Negative for hearing loss, trouble swallowing and voice change.    Eyes:  Negative for visual disturbance.   Respiratory:  Negative for apnea, cough, chest tightness, shortness of breath and wheezing.    Cardiovascular:  Negative for chest pain, palpitations and leg swelling.   Gastrointestinal:  Positive for abdominal pain, constipation and diarrhea. Negative for abdominal distention, anal bleeding, blood in stool, nausea, rectal pain and vomiting.   Endocrine: Negative for cold intolerance and heat intolerance.   Genitourinary:  Negative for difficulty urinating, dysuria and flank pain.   Musculoskeletal:  Negative for back pain and gait problem.   Skin:  Negative for color change, rash and wound.   Neurological:  Negative for dizziness, syncope, speech difficulty, weakness, light-headedness, numbness and headaches.   Hematological:  Negative for adenopathy. Does not bruise/bleed easily.   Psychiatric/Behavioral:  Negative for confusion. The patient is not nervous/anxious.    All other systems reviewed and are negative.        Allergies:  Allergies   Allergen Reactions    Augmentin [Amoxicillin-Pot Clavulanate] Nausea And Vomiting               Bactrim [Sulfamethoxazole-Trimethoprim] GI Intolerance       Medications:    Current Outpatient Medications:     Allergy Relief/Nasal Decongest  MG per 24 hr tablet, TAKE 1 TABLET BY MOUTH ONCE DAILY AS NEEDED FOR CONGESTION, Disp: , Rfl:     aspirin 81 MG chewable tablet, Chew 1 tablet Daily., Disp: , Rfl:     atorvastatin (LIPITOR) 80 MG tablet, Take 1 tablet by mouth Daily., Disp: 90 tablet, Rfl: 3    buPROPion XL (WELLBUTRIN XL) 300 MG 24 hr tablet, Take 1 tablet by mouth Every Morning., Disp: , Rfl:     carvedilol (Coreg) 25 MG tablet, Take 1 tablet by mouth 2 (Two) Times a Day With Meals for 360 days., Disp: 90 tablet, Rfl: 3    cyclobenzaprine (FLEXERIL) 5 MG tablet, Take 1 tablet by mouth At Night As Needed for Muscle Spasms., Disp: 30 tablet, Rfl: 5    fluticasone (FLONASE) 50 MCG/ACT nasal spray, , Disp: , Rfl:     valACYclovir (VALTREX) 500 MG tablet, Take 1 tablet by mouth Daily., Disp: 102 tablet, Rfl: 2    Viloxazine HCl ER (Qelbree) 200 MG capsule sustained-release 24 hr, Take 1 capsule by mouth Daily., Disp: 30 capsule, Rfl: 1    clopidogrel (PLAVIX) 75 MG tablet, Take 1 tablet by mouth Daily for 360 days., Disp: 90 tablet, Rfl: 3    History:  Past Medical History:   Diagnosis Date    ADHD (attention deficit hyperactivity disorder) 10/2002    Allergic 03/2002    Had prick test done several years ago. Didn't explain sympto    Anemia     Anxiety 10/2006    I have a stressful job    Coronary artery disease 12/22/2023    Heart attack    Depression 06/2018    Diverticulitis 2023    Diverticulitis of colon 04/25/2023    Diverticulosis 04/25/2023    Diagnosed at [facility].    Exposure to sexually transmitted disease (STD)     Headache     History of anemia     History of body piercing     History of ECG 08/26/2016    History of hypertension     History of kidney infection     History of migraine     Hyperlipidemia     Hypertension 2006    no medication required    Low back pain  2016    MRI showed bulging discs at L4 & L5. 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    Urinary tract infection     Varicella     Visual impairment 2002    I wear glasses       Past Surgical History:   Procedure Laterality Date    BREAST AUGMENTATION  2016    BREAST AUGMENTATION  2016    BREAST AUGMENTATION  2016    BREAST SURGERY  2016    Breast augmentation    CARDIAC CATHETERIZATION N/A 2023    Procedure: Coronary angiography;  Surgeon: Jacinto Daly MD;  Location: [facility] CATH INVASIVE LOCATION;  Service: Cardiology;  Laterality: N/A;     SECTION      COLONOSCOPY  2023    CORONARY STENT PLACEMENT  2023    COSMETIC SURGERY  2016    Breast augmentation    FOREARM FRACTURE SURGERY      SPINE SURGERY  2020    Discectomy L5/S1    TUBAL ABDOMINAL LIGATION  2023    Also ablation    TUBAL COAGULATION LAPAROSCOPIC N/A 2023    Procedure: TUBAL FALLOPE FILSHIE CLIPPING LAPAROSCOPIC, hysteroscopy, dilatation curettage, removal of ParaGard IUD,cerene ablation;  Surgeon: Steven Avila MD;  Location: [facility] OR;  Service: Obstetrics/Gynecology;  Laterality: N/A;    UMBILICAL HERNIA REPAIR      with mesh    WISDOM TOOTH EXTRACTION         Family History   Problem Relation Age of Onset    Hyperlipidemia Mother     Obesity Mother     Osteoporosis Mother     Arthritis Mother         Arthritis in foot    Diabetes Mother         She is currently pre diabetic    Stroke Mother         Possibly stroke detected during MRI    Hypertension Mother     Stroke Father     Arthritis Father     Hyperlipidemia Father     Hypertension Father     Mental illness Father     Migraines Father     Obesity  "Father     Cancer Father         Squamous cell carcinoma    Drug abuse Father         Lifetime marijuana use    Breast cancer Paternal Grandmother     Arthritis Paternal Uncle         Rheumatoid arthritis    Arthritis Other        Social History     Tobacco Use    Smoking status: Former     Current packs/day: 0.00     Types: Electronic Cigarette, Cigarettes     Quit date: 4/1/2021     Years since quitting: 3.7     Passive exposure: Never    Smokeless tobacco: Never    Tobacco comments:     Not sure when i quit exactly. I never felt particularly addicted so i just quit.   Vaping Use    Vaping status: Every Day    Substances: Nicotine   Substance Use Topics    Alcohol use: Yes     Alcohol/week: 1.0 standard drink of alcohol     Types: 1 Shots of liquor per week     Comment: Occasional alcohol use    Drug use: No        OBJECTIVE:    Vital Signs:   Vitals:    01/02/25 1249   BP: 110/82   Pulse: 74   Temp: 98.3 °F (36.8 °C)   TempSrc: Temporal   SpO2: 97%   Weight: 91.5 kg (201 lb 12.8 oz)   Height: 175.3 cm (69\")       Physical Exam:       Abdomen: Soft nontender nondistended.    Results Review:   I reviewed the patient's new clinical results.  ET report was reviewed    Review of Systems was reviewed and confirmed as accurate as documented by the MA.    ASSESSMENT/PLAN:    1. Diverticulitis        Currently asymptomatic.  I recommend optimizing her bowel regimen to avoid constipation.  Either high-fiber diet/fiber supplement versus MiraLAX as she has not had good success with fiber in the past.  I explained to her that she may continue to have episodes of diverticulitis even severe complicated diverticulitis.  At this time she wants to manage her relative constipation first and avoid surgery for now.  I think that is reasonable.  We can certainly discuss possible sigmoidectomy as necessary especially with recurrent episodes.  Otherwise she will follow-up with me as needed.          Electronically signed by Sanjay " MD Fuad  01/02/25

## 2025-01-02 ENCOUNTER — PATIENT ROUNDING (BHMG ONLY) (OUTPATIENT)
Dept: SURGERY | Facility: CLINIC | Age: 45
End: 2025-01-02
Payer: MEDICAID

## 2025-01-02 ENCOUNTER — OFFICE VISIT (OUTPATIENT)
Dept: SURGERY | Facility: CLINIC | Age: 45
End: 2025-01-02
Payer: MEDICAID

## 2025-01-02 VITALS
SYSTOLIC BLOOD PRESSURE: 110 MMHG | BODY MASS INDEX: 29.89 KG/M2 | DIASTOLIC BLOOD PRESSURE: 82 MMHG | HEIGHT: 69 IN | WEIGHT: 201.8 LBS | TEMPERATURE: 98.3 F | HEART RATE: 74 BPM | OXYGEN SATURATION: 97 %

## 2025-01-02 DIAGNOSIS — K57.92 DIVERTICULITIS: Primary | ICD-10-CM

## 2025-01-02 NOTE — PROGRESS NOTES
January 2, 2025    Hello, may I speak with Leigh Negron?    My name is Rika    I am  with [facility]  [address]  [place] [id number] [phone].    Before we get started may I verify your date of birth? 1980    I am calling to officially welcome you to our practice and ask about your recent visit. Is this a good time to talk? no    Tell me about your visit with us. What things went well?  great Visit. With [employer] [facility]       We're always looking for ways to make our patients' experiences even better. Do you have recommendations on ways we may improve?  no    Overall were you satisfied with your first visit to our practice? yes       I appreciate you taking the time to speak with me today. Is there anything else I can do for you? no      Thank you, and have a great day.

## 2025-02-27 ENCOUNTER — APPOINTMENT (OUTPATIENT)
Dept: CT IMAGING | Facility: HOSPITAL | Age: 45
End: 2025-02-27
Payer: MEDICAID

## 2025-02-27 ENCOUNTER — HOSPITAL ENCOUNTER (EMERGENCY)
Facility: HOSPITAL | Age: 45
Discharge: HOME | End: 2025-02-27
Attending: EMERGENCY MEDICINE
Payer: MEDICAID

## 2025-02-27 VITALS
SYSTOLIC BLOOD PRESSURE: 145 MMHG | RESPIRATION RATE: 20 BRPM | HEART RATE: 63 BPM | DIASTOLIC BLOOD PRESSURE: 103 MMHG | HEIGHT: 69 IN | OXYGEN SATURATION: 99 % | WEIGHT: 200.62 LBS | BODY MASS INDEX: 29.71 KG/M2 | TEMPERATURE: 98 F

## 2025-02-27 DIAGNOSIS — N83.201 CYST OF RIGHT OVARY: Primary | ICD-10-CM

## 2025-02-27 LAB
ALBUMIN SERPL-MCNC: 4.6 G/DL (ref 3.5–5.2)
ALBUMIN/GLOB SERPL: 1.6 G/DL
ALP SERPL-CCNC: 72 U/L (ref 39–117)
ALT SERPL W P-5'-P-CCNC: 46 U/L (ref 1–33)
ANION GAP SERPL CALCULATED.3IONS-SCNC: 13.3 MMOL/L (ref 5–15)
AST SERPL-CCNC: 34 U/L (ref 1–32)
B-HCG UR QL: NEGATIVE
BASOPHILS # BLD AUTO: 0.07 10*3/MM3 (ref 0–0.2)
BASOPHILS NFR BLD AUTO: 0.9 % (ref 0–1.5)
BILIRUB SERPL-MCNC: 0.3 MG/DL (ref 0–1.2)
BILIRUB UR QL STRIP: NEGATIVE
BUN SERPL-MCNC: 14 MG/DL (ref 6–20)
BUN/CREAT SERPL: 19.4 (ref 7–25)
CALCIUM SPEC-SCNC: 9.2 MG/DL (ref 8.6–10.5)
CHLORIDE SERPL-SCNC: 104 MMOL/L (ref 98–107)
CLARITY UR: CLEAR
CO2 SERPL-SCNC: 20.7 MMOL/L (ref 22–29)
COLOR UR: YELLOW
CREAT SERPL-MCNC: 0.72 MG/DL (ref 0.57–1)
DEPRECATED RDW RBC AUTO: 39.7 FL (ref 37–54)
EGFRCR SERPLBLD CKD-EPI 2021: 105.9 ML/MIN/1.73
EOSINOPHIL # BLD AUTO: 0.15 10*3/MM3 (ref 0–0.4)
EOSINOPHIL NFR BLD AUTO: 2 % (ref 0.3–6.2)
ERYTHROCYTE [DISTWIDTH] IN BLOOD BY AUTOMATED COUNT: 12.4 % (ref 12.3–15.4)
GLOBULIN UR ELPH-MCNC: 2.8 GM/DL
GLUCOSE SERPL-MCNC: 81 MG/DL (ref 65–99)
GLUCOSE UR STRIP-MCNC: NEGATIVE MG/DL
HCT VFR BLD AUTO: 45.1 % (ref 34–46.6)
HGB BLD-MCNC: 15.3 G/DL (ref 12–15.9)
HGB UR QL STRIP.AUTO: NEGATIVE
HOLD SPECIMEN: NORMAL
HOLD SPECIMEN: NORMAL
IMM GRANULOCYTES # BLD AUTO: 0.01 10*3/MM3 (ref 0–0.05)
IMM GRANULOCYTES NFR BLD AUTO: 0.1 % (ref 0–0.5)
KETONES UR QL STRIP: NEGATIVE
LEUKOCYTE ESTERASE UR QL STRIP.AUTO: NEGATIVE
LIPASE SERPL-CCNC: 19 U/L (ref 13–60)
LYMPHOCYTES # BLD AUTO: 2.34 10*3/MM3 (ref 0.7–3.1)
LYMPHOCYTES NFR BLD AUTO: 31.8 % (ref 19.6–45.3)
MCH RBC QN AUTO: 29.5 PG (ref 26.6–33)
MCHC RBC AUTO-ENTMCNC: 33.9 G/DL (ref 31.5–35.7)
MCV RBC AUTO: 86.9 FL (ref 79–97)
MONOCYTES # BLD AUTO: 0.38 10*3/MM3 (ref 0.1–0.9)
MONOCYTES NFR BLD AUTO: 5.2 % (ref 5–12)
NEUTROPHILS NFR BLD AUTO: 4.42 10*3/MM3 (ref 1.7–7)
NEUTROPHILS NFR BLD AUTO: 60 % (ref 42.7–76)
NITRITE UR QL STRIP: NEGATIVE
NRBC BLD AUTO-RTO: 0 /100 WBC (ref 0–0.2)
PH UR STRIP.AUTO: <=5 [PH] (ref 5–8)
PLATELET # BLD AUTO: 269 10*3/MM3 (ref 140–450)
PMV BLD AUTO: 11.5 FL (ref 6–12)
POTASSIUM SERPL-SCNC: 3.9 MMOL/L (ref 3.5–5.2)
PROT SERPL-MCNC: 7.4 G/DL (ref 6–8.5)
PROT UR QL STRIP: NEGATIVE
RBC # BLD AUTO: 5.19 10*6/MM3 (ref 3.77–5.28)
SODIUM SERPL-SCNC: 138 MMOL/L (ref 136–145)
SP GR UR STRIP: <=1.005 (ref 1–1.03)
TROPONIN T SERPL HS-MCNC: <6 NG/L
UROBILINOGEN UR QL STRIP: NORMAL
WBC NRBC COR # BLD AUTO: 7.37 10*3/MM3 (ref 3.4–10.8)
WHOLE BLOOD HOLD COAG: NORMAL
WHOLE BLOOD HOLD SPECIMEN: NORMAL

## 2025-02-27 PROCEDURE — 83690 ASSAY OF LIPASE: CPT | Performed by: EMERGENCY MEDICINE

## 2025-02-27 PROCEDURE — 99285 EMERGENCY DEPT VISIT HI MDM: CPT | Performed by: EMERGENCY MEDICINE

## 2025-02-27 PROCEDURE — 81025 URINE PREGNANCY TEST: CPT | Performed by: NURSE PRACTITIONER

## 2025-02-27 PROCEDURE — 25010000002 KETOROLAC TROMETHAMINE PER 15 MG: Performed by: NURSE PRACTITIONER

## 2025-02-27 PROCEDURE — 25510000001 IOPAMIDOL 61 % SOLUTION: Performed by: EMERGENCY MEDICINE

## 2025-02-27 PROCEDURE — 81003 URINALYSIS AUTO W/O SCOPE: CPT | Performed by: EMERGENCY MEDICINE

## 2025-02-27 PROCEDURE — 80053 COMPREHEN METABOLIC PANEL: CPT | Performed by: EMERGENCY MEDICINE

## 2025-02-27 PROCEDURE — 85025 COMPLETE CBC W/AUTO DIFF WBC: CPT | Performed by: EMERGENCY MEDICINE

## 2025-02-27 PROCEDURE — 96374 THER/PROPH/DIAG INJ IV PUSH: CPT

## 2025-02-27 PROCEDURE — 84484 ASSAY OF TROPONIN QUANT: CPT | Performed by: EMERGENCY MEDICINE

## 2025-02-27 PROCEDURE — 74177 CT ABD & PELVIS W/CONTRAST: CPT

## 2025-02-27 PROCEDURE — 93005 ELECTROCARDIOGRAM TRACING: CPT | Performed by: EMERGENCY MEDICINE

## 2025-02-27 RX ORDER — SODIUM CHLORIDE 0.9 % (FLUSH) 0.9 %
10 SYRINGE (ML) INJECTION AS NEEDED
Status: DISCONTINUED | OUTPATIENT
Start: 2025-02-27 | End: 2025-02-27 | Stop reason: HOSPADM

## 2025-02-27 RX ORDER — KETOROLAC TROMETHAMINE 30 MG/ML
30 INJECTION, SOLUTION INTRAMUSCULAR; INTRAVENOUS ONCE
Status: COMPLETED | OUTPATIENT
Start: 2025-02-27 | End: 2025-02-27

## 2025-02-27 RX ORDER — IOPAMIDOL 612 MG/ML
100 INJECTION, SOLUTION INTRAVASCULAR
Status: COMPLETED | OUTPATIENT
Start: 2025-02-27 | End: 2025-02-27

## 2025-02-27 RX ORDER — HYDROCODONE BITARTRATE AND ACETAMINOPHEN 5; 325 MG/1; MG/1
1 TABLET ORAL EVERY 6 HOURS PRN
Qty: 12 TABLET | Refills: 0 | Status: SHIPPED | OUTPATIENT
Start: 2025-02-27 | End: 2025-03-02

## 2025-02-27 RX ADMIN — KETOROLAC TROMETHAMINE 30 MG: 30 INJECTION, SOLUTION INTRAMUSCULAR; INTRAVENOUS at 19:51

## 2025-02-27 RX ADMIN — IOPAMIDOL 100 ML: 612 INJECTION, SOLUTION INTRAVENOUS at 19:02

## 2025-02-28 NOTE — ED PROVIDER NOTES
Subjective:  History of Present Illness:    Patient is a 45-year-old female with history of diverticulitis.  Presents to the ER today with right lower quadrant abdominal pain.  Patient reports that pain started yesterday.  Reports that radiates into her back and hip.  She denies changes in bowel or bladder habit.  Denies chest pain or shortness of breath.  Denies fever.  Denies OTC medication or home remedy.  Denies alleviating or exacerbating factors.    Nurses Notes reviewed and agree, including vitals, allergies, social history and prior medical history.     REVIEW OF SYSTEMS: All systems reviewed and not pertinent unless noted.  Review of Systems   Gastrointestinal:  Positive for abdominal pain.   All other systems reviewed and are negative.      Past Medical History:   Diagnosis Date    ADHD (attention deficit hyperactivity disorder) 10/2002    Allergic 03/2002    Had prick test done several years ago. Didn't explain sympto    Anemia     Anxiety 10/2006    I have a stressful job    Coronary artery disease 12/22/2023    Heart attack    Depression 06/2018    Diverticulitis 2023    Diverticulitis of colon 04/25/2023    Diverticulosis 04/25/2023    Diagnosed at [facility].    Exposure to sexually transmitted disease (STD)     Headache     History of anemia     History of body piercing     History of ECG 08/26/2016    History of hypertension     History of kidney infection     History of migraine     Hyperlipidemia     Hypertension 2006    no medication required    Low back pain 09/2016    MRI showed bulging discs at L4 & L5. 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    Urinary tract infection 2004    Varicella 1986    Visual impairment 07/2002    I  wear glasses       Allergies:    Augmentin [amoxicillin-pot clavulanate] and Bactrim [sulfamethoxazole-trimethoprim]      Past Surgical History:   Procedure Laterality Date    BREAST AUGMENTATION  2016    BREAST AUGMENTATION  2016    BREAST AUGMENTATION  2016    BREAST SURGERY  2016    Breast augmentation    CARDIAC CATHETERIZATION N/A 2023    Procedure: Coronary angiography;  Surgeon: Jacinto Daly MD;  Location: [facility] CATH INVASIVE LOCATION;  Service: Cardiology;  Laterality: N/A;     SECTION      COLONOSCOPY  2023    CORONARY STENT PLACEMENT  2023    COSMETIC SURGERY  2016    Breast augmentation    FOREARM FRACTURE SURGERY      SPINE SURGERY  2020    Discectomy L5/S1    TUBAL ABDOMINAL LIGATION  2023    Also ablation    TUBAL COAGULATION LAPAROSCOPIC N/A 2023    Procedure: TUBAL FALLOPE FILSHIE CLIPPING LAPAROSCOPIC, hysteroscopy, dilatation curettage, removal of ParaGard IUD,cerene ablation;  Surgeon: Setven Avila MD;  Location: [facility] OR;  Service: Obstetrics/Gynecology;  Laterality: N/A;    UMBILICAL HERNIA REPAIR      with mesh    WISDOM TOOTH EXTRACTION           Social History     Socioeconomic History    Marital status:    Tobacco Use    Smoking status: Former     Current packs/day: 0.00     Types: Electronic Cigarette, Cigarettes     Quit date: 2021     Years since quitting: 3.9     Passive exposure: Never    Smokeless tobacco: Never    Tobacco comments:     Not sure when i quit exactly. I never felt particularly addicted so i just quit.   Vaping Use    Vaping status: Every Day    Substances: Nicotine   Substance and Sexual Activity    Alcohol use: Yes     Alcohol/week: 1.0 standard drink of alcohol     Types: 1 Shots of liquor per week     Comment: Occasional alcohol use    Drug use: No    Sexual activity: Yes     Partners: Male     Birth control/protection: Tubal ligation     Comment: I'm interested in getting my  "tubes tied         Family History   Problem Relation Age of Onset    Hyperlipidemia Mother     Obesity Mother     Osteoporosis Mother     Arthritis Mother         Arthritis in foot    Diabetes Mother         She is currently pre diabetic    Stroke Mother         Possibly stroke detected during MRI    Hypertension Mother     Stroke Father     Arthritis Father     Hyperlipidemia Father     Hypertension Father     Mental illness Father     Migraines Father     Obesity Father     Cancer Father         Squamous cell carcinoma    Drug abuse Father         Lifetime marijuana use    Breast cancer Paternal Grandmother     Arthritis Paternal Uncle         Rheumatoid arthritis    Arthritis Other        Objective  Physical Exam:  BP (!) 145/103 (BP Location: Left arm, Patient Position: Sitting)   Pulse 63   Temp 98 °F (36.7 °C) (Oral)   Resp 20   Ht 175 cm (68.9\")   Wt 91 kg (200 lb 9.9 oz)   SpO2 99%   BMI 29.71 kg/m²      Physical Exam  Vitals and nursing note reviewed.   Constitutional:       Appearance: She is well-developed and normal weight.   HENT:      Head: Normocephalic and atraumatic.      Mouth/Throat:      Mouth: Mucous membranes are moist.      Pharynx: Oropharynx is clear.   Eyes:      Extraocular Movements: Extraocular movements intact.      Pupils: Pupils are equal, round, and reactive to light.   Cardiovascular:      Rate and Rhythm: Normal rate and regular rhythm.   Pulmonary:      Effort: Pulmonary effort is normal.      Breath sounds: Normal breath sounds.   Abdominal:      General: Abdomen is flat. Bowel sounds are normal.      Palpations: Abdomen is soft.      Tenderness:  in the right lower quadrant   Skin:     General: Skin is warm.      Capillary Refill: Capillary refill takes less than 2 seconds.   Neurological:      General: No focal deficit present.      Mental Status: She is alert and oriented to person, place, and time.   Psychiatric:         Mood and Affect: Mood normal.         Behavior: " Behavior normal.         Procedures    ED Course:    ED Course as of 02/28/25 0153   Thu Feb 27, 2025 1930 EKG: I reviewed and independently interpreted the EKG as:  Sinus rhythm at 71 bpm, normal axis, normal intervals, no ST elevation, no T wave inversions [CS]      ED Course User Index  [CS] Willis Ramírez MD       Lab Results (last 24 hours)       Procedure Component Value Units Date/Time    Urinalysis With Microscopic If Indicated (No Culture) - Urine, Clean Catch [[id number]]  (Normal) Collected: 02/27/25 1856    Specimen: Urine, Clean Catch Updated: 02/27/25 1903     Color, UA Yellow     Appearance, UA Clear     pH, UA <=5.0     Specific Gravity, UA <=1.005     Glucose, UA Negative     Ketones, UA Negative     Bilirubin, UA Negative     Blood, UA Negative     Protein, UA Negative     Leuk Esterase, UA Negative     Nitrite, UA Negative     Urobilinogen, UA 0.2 E.U./dL    Narrative:      Urine microscopic not indicated.    Pregnancy, Urine - Urine, Clean Catch [[id number]]  (Normal) Collected: 02/27/25 1856    Specimen: Urine, Clean Catch Updated: 02/27/25 1905     HCG, Urine QL Negative    CBC & Differential [[id number]]  (Normal) Collected: 02/27/25 1857    Specimen: Blood Updated: 02/27/25 1903    Narrative:      The following orders were created for panel order CBC & Differential.  Procedure                               Abnormality         Status                     ---------                               -----------         ------                     CBC Auto Differential[[id number]]        Normal              Final result                 Please view results for these tests on the individual orders.    Comprehensive Metabolic Panel [[id number]]  (Abnormal) Collected: 02/27/25 1857    Specimen: Blood Updated: 02/27/25 1920     Glucose 81 mg/dL      BUN 14 mg/dL      Creatinine 0.72 mg/dL      Sodium 138 mmol/L      Potassium 3.9 mmol/L      Comment: Slight hemolysis detected by analyzer. Result may  be falsely elevated.        Chloride 104 mmol/L      CO2 20.7 mmol/L      Calcium 9.2 mg/dL      Total Protein 7.4 g/dL      Albumin 4.6 g/dL      ALT (SGPT) 46 U/L      AST (SGOT) 34 U/L      Alkaline Phosphatase 72 U/L      Total Bilirubin 0.3 mg/dL      Globulin 2.8 gm/dL      A/G Ratio 1.6 g/dL      BUN/Creatinine Ratio 19.4     Anion Gap 13.3 mmol/L      eGFR 105.9 mL/min/1.73     Narrative:      GFR Categories in Chronic Kidney Disease (CKD)      GFR Category          GFR (mL/min/1.73)    Interpretation  G1                     90 or greater         Normal or high (1)  G2                      60-89                Mild decrease (1)  G3a                   45-59                Mild to moderate decrease  G3b                   30-44                Moderate to severe decrease  G4                    15-29                Severe decrease  G5                    14 or less           Kidney failure          (1)In the absence of evidence of kidney disease, neither GFR category G1 or G2 fulfill the criteria for CKD.    eGFR calculation 2021 CKD-EPI creatinine equation, which does not include race as a factor    Lipase [[id number]]  (Normal) Collected: 02/27/25 1857    Specimen: Blood Updated: 02/27/25 1920     Lipase 19 U/L     High Sensitivity Troponin T [[id number]]  (Normal) Collected: 02/27/25 1857    Specimen: Blood Updated: 02/27/25 1922     HS Troponin T <6 ng/L     Narrative:      High Sensitive Troponin T Reference Range:  <14.0 ng/L- Negative Female for AMI  <22.0 ng/L- Negative Male for AMI  >=14 - Abnormal Female indicating possible myocardial injury.  >=22 - Abnormal Male indicating possible myocardial injury.   Clinicians would have to utilize clinical acumen, EKG, Troponin, and serial changes to determine if it is an Acute Myocardial Infarction or myocardial injury due to an underlying chronic condition.         CBC Auto Differential [[id number]]  (Normal) Collected: 02/27/25 1857    Specimen: Blood Updated:  02/27/25 1903     WBC 7.37 10*3/mm3      RBC 5.19 10*6/mm3      Hemoglobin 15.3 g/dL      Hematocrit 45.1 %      MCV 86.9 fL      MCH 29.5 pg      MCHC 33.9 g/dL      RDW 12.4 %      RDW-SD 39.7 fl      MPV 11.5 fL      Platelets 269 10*3/mm3      Neutrophil % 60.0 %      Lymphocyte % 31.8 %      Monocyte % 5.2 %      Eosinophil % 2.0 %      Basophil % 0.9 %      Immature Grans % 0.1 %      Neutrophils, Absolute 4.42 10*3/mm3      Lymphocytes, Absolute 2.34 10*3/mm3      Monocytes, Absolute 0.38 10*3/mm3      Eosinophils, Absolute 0.15 10*3/mm3      Basophils, Absolute 0.07 10*3/mm3      Immature Grans, Absolute 0.01 10*3/mm3      nRBC 0.0 /100 WBC              CT Abdomen Pelvis With Contrast    Result Date: 2/27/2025  FINAL REPORT TECHNIQUE: null CLINICAL HISTORY: Rule out appendicitis COMPARISON: null FINDINGS: Exam: CT Abdomen and Pelvis with contrast Comparison: None Clinical history: Rule out appendicitis. Findings: The liver, spleen and pancreas do not demonstrate any acute process. No gallstones or gallbladder wall thickening to suggest acute cholecystitis No choledocholithiasis or biliary obstruction. Normal adrenal glands. No renal calculi or hydronephrosis. No abdominal aortic aneurysm. No small bowel obstruction Appendix is normal in caliber No colitis or diverticulitis. Urinary bladder is decompressed. Urinary bladder wall thickening may be due to underdistention or cystitis. Please correlate with urinalysis. Uterus and left adnexa have a normal appearance for CT. Possible 1.9 cm cyst in the right ovary. Bilateral fallopian tube clips are seen. Trace free fluid in the cul-de-sac. Prior umbilical hernia repair. Degenerative disease at L5-S1     Impression: Impression: 1. Normal appendix 2. Urinary bladder wall thickening may be due to underdistention or cystitis. Please correlate with urinalysis. 3. Uterus and left adnexa have a normal appearance for CT. Possible 1.9 cm cyst in the right ovary. Bilateral  fallopian tube clips are seen. Trace free fluid in the cul-de-sac. Authenticated and Electronically Signed by Harmony Alvarado MD on 02/27/2025 07:35:51 PM        MDM      Initial impression of presenting illness:   Patient is a 45-year-old female with history of diverticulitis.  Presents to the ER today with right lower quadrant abdominal pain.  Patient reports that pain started yesterday.  Reports that radiates into her back and hip.  She denies changes in bowel or bladder habit.  Denies chest pain or shortness of breath.  Denies fever.  Denies OTC medication or home remedy.  Denies alleviating or exacerbating factors.    DDX: includes but is not limited to: Diverticulitis, appendicitis, colitis, gastroenteritis, COVID, flu, constipation or other    Patient arrives stable with vitals interpreted by myself.     Pertinent features from physical exam: Lung sounds are clear bilaterally throughout.  Abdomen is soft.  There is mild tenderness with palpation right lower quadrant.  Bowel sounds are normal.  Heart sounds are normal..    Initial diagnostic plan: CBC, CMP, lipase, troponin, urine pregnancy, urinalysis, EKG, CT abdomen pelvis with contrast    Results from initial plan were reviewed and interpreted by me revealing CBC is within appropriate range.  CMP is with mild elevation serum liver enzyme otherwise within appropriate range.  Troponin x 1 is negative.  Lipase was negative.  Urine pregnancy is negative.  Urinalysis is negative.  EKG sinus rhythm rate of 71 bpm.  CT abdomen pelvis with the following impression normal appendix.  Urinary bladder wall thickening may be due to underdistention or cystitis.  Please correlate with urinalysis.  Uterus and left adnexa have a normal appearance for CT possible 1.9 cm cyst in the right ovary.  Bilateral fallopian tube clips are seen trace free fluid in the cul-de-sac    Diagnostic information from other sources: Chart review    Interventions / Re-evaluation: Stable  throughout encounter    Results/clinical rationale were discussed with patient discussed evaluation with ultrasound.  Patient declines at this time.    Consultations/Discussion of results with other physicians: N/A    Disposition plan: Patient is hemodynamically stable nontoxic-appearing appropriate discharge.  Will have her follow-up with OB/GYN.  Follow-up with PCP.  -----        Final diagnoses:   Cyst of right ovary          Kota Fuentes, APRN  02/28/25 0153

## 2025-04-03 ENCOUNTER — OFFICE VISIT (OUTPATIENT)
Dept: FAMILY MEDICINE CLINIC | Facility: CLINIC | Age: 45
End: 2025-04-03
Payer: MEDICAID

## 2025-04-03 VITALS
BODY MASS INDEX: 30.78 KG/M2 | OXYGEN SATURATION: 98 % | SYSTOLIC BLOOD PRESSURE: 114 MMHG | WEIGHT: 207.8 LBS | HEIGHT: 69 IN | DIASTOLIC BLOOD PRESSURE: 72 MMHG | HEART RATE: 71 BPM

## 2025-04-03 DIAGNOSIS — Z00.00 WELL ADULT EXAM: Primary | ICD-10-CM

## 2025-04-03 DIAGNOSIS — Z11.3 SCREENING EXAMINATION FOR STI: ICD-10-CM

## 2025-04-03 DIAGNOSIS — Z11.4 SCREENING FOR HIV (HUMAN IMMUNODEFICIENCY VIRUS): ICD-10-CM

## 2025-04-03 DIAGNOSIS — N89.8 VAGINAL DISCHARGE: ICD-10-CM

## 2025-04-03 DIAGNOSIS — Z20.2 POSSIBLE EXPOSURE TO STI: ICD-10-CM

## 2025-04-03 PROCEDURE — 3074F SYST BP LT 130 MM HG: CPT | Performed by: FAMILY MEDICINE

## 2025-04-03 PROCEDURE — 3078F DIAST BP <80 MM HG: CPT | Performed by: FAMILY MEDICINE

## 2025-04-03 PROCEDURE — 1160F RVW MEDS BY RX/DR IN RCRD: CPT | Performed by: FAMILY MEDICINE

## 2025-04-03 PROCEDURE — 1126F AMNT PAIN NOTED NONE PRSNT: CPT | Performed by: FAMILY MEDICINE

## 2025-04-03 PROCEDURE — 1159F MED LIST DOCD IN RCRD: CPT | Performed by: FAMILY MEDICINE

## 2025-04-03 PROCEDURE — 99396 PREV VISIT EST AGE 40-64: CPT | Performed by: FAMILY MEDICINE

## 2025-04-03 NOTE — PROGRESS NOTES
"Subjective   Leigh Negron is a 45 y.o. female.     History of Present Illness  Mrs. Jara presents today for annual checkup.    status post  2009  S/p tubal ligation.  Last Pap smear  normal with negative high-risk HPV testing. Distant history of abnormal Pap smear in .      She is a former smoker.  Smoked approximately 1 pack/day for 2 years. Quit in the year . Currently using E cig     Endorses moderate alcohol intake.  Getting irregular exercise. She is wearing her seatbelt appropriately.  Has a firearm in home.  She is up-to-date on vaccinations.  Last dental checkup over a year ago.      She is sexually active. Endorses 40+ sexual partners in her lifetime. Previous STD screening negative. She requests GC/Chlamydia screen, NuSwab. On valtrex for recurrent gen herpes. Denies genital lesion, vaginal pain or pelvic pain. some vaginal discharge.     Baseline mammogram  BiRADS 2     Followed by cardiology for h/o SCAD. On ASA, full dose statin, BB. No recent symptoms. Following more cardiac prudent diet. Exercising more regularly.    Seen in ER recently for pelvic pain. Found to have right ovarian cyst. H/o ablation but still having menses. LNMP 2 weeeks ago. Mood/appetite fluctuate with cycle. Has tried combined OCP in past, apparently worsened symptoms. Progesterone only \"killed her sex drive\"    Last vision exam - few years ago.    Had a colonoscopy in  for symptoms. Told to have next in 10 yrs for colon CA screening purposes.    Pt's previous HPI reviewed and updated as indicated.     The following portions of the patient's history were reviewed and updated as appropriate: allergies, current medications, past family history, past medical history, past social history, past surgical history, and problem list.    Review of Systems   Constitutional:  Positive for fever. Negative for fatigue and unexpected weight change.   HENT:  Negative for congestion, mouth sores, " rhinorrhea, sore throat and trouble swallowing.    Eyes:  Negative for visual disturbance.   Respiratory:  Negative for cough, shortness of breath and wheezing.    Cardiovascular:  Negative for chest pain, palpitations and leg swelling.   Gastrointestinal:  Positive for constipation and diarrhea. Negative for abdominal pain, blood in stool, nausea and vomiting.   Endocrine: Negative for polydipsia and polyuria.   Genitourinary:  Positive for pelvic pain and vaginal discharge. Negative for dysuria, flank pain, hematuria and urgency.   Musculoskeletal:  Positive for back pain and myalgias. Negative for arthralgias.   Skin:  Negative for rash and wound.   Neurological:  Negative for dizziness, tremors, syncope, weakness, numbness and headaches.   Hematological:  Negative for adenopathy. Bruises/bleeds easily.   Psychiatric/Behavioral:  Positive for decreased concentration and sleep disturbance. Negative for confusion, dysphoric mood and suicidal ideas. The patient is nervous/anxious and is hyperactive.    Pt's previous ROS reviewed and updated as indicated.       Objective   Vitals:    04/03/25 1125   BP: 114/72   Pulse: 71   SpO2: 98%     Body mass index is 30.69 kg/m².      04/03/25  1125   Weight: 94.3 kg (207 lb 12.8 oz)       Physical Exam  Vitals and nursing note reviewed.   Constitutional:       General: She is not in acute distress.     Appearance: She is well-groomed and overweight. She is not ill-appearing.   HENT:      Head: Atraumatic.      Right Ear: Tympanic membrane, ear canal and external ear normal.      Left Ear: Tympanic membrane, ear canal and external ear normal.      Nose: Nose normal.      Mouth/Throat:      Mouth: Mucous membranes are moist. No oral lesions.      Pharynx: Oropharynx is clear.   Eyes:      General: No scleral icterus.     Conjunctiva/sclera: Conjunctivae normal.   Neck:      Thyroid: No thyroid mass.   Cardiovascular:      Rate and Rhythm: Normal rate and regular rhythm.       Pulses: Normal pulses.      Heart sounds: Normal heart sounds.   Pulmonary:      Effort: Pulmonary effort is normal.      Breath sounds: Normal breath sounds.   Abdominal:      General: Bowel sounds are normal. There is no distension.      Palpations: Abdomen is soft. There is no hepatomegaly, splenomegaly or mass.      Tenderness: There is abdominal tenderness (mild) in the right lower quadrant. There is no right CVA tenderness, left CVA tenderness, guarding or rebound.   Musculoskeletal:      Right lower leg: No edema.      Left lower leg: No edema.   Lymphadenopathy:      Cervical: No cervical adenopathy.   Skin:     General: Skin is warm and dry.      Coloration: Skin is not jaundiced or pale.      Findings: No bruising or rash.   Neurological:      Mental Status: She is alert and oriented to person, place, and time.      Motor: No tremor.      Gait: Gait is intact.   Psychiatric:         Mood and Affect: Mood and affect normal.         Behavior: Behavior normal. Behavior is cooperative.         Cognition and Memory: Cognition normal.     Pt's previous physical exam reviewed and updated as indicated.      Assessment & Plan   Diagnoses and all orders for this visit:    1. Well adult exam (Primary)  -     CBC & Differential  -     Comprehensive Metabolic Panel  -     Lipid Panel  -     TSH Rfx On Abnormal To Free T4  -     Hemoglobin A1c  -     HIV-1 / O / 2 Ag / Antibody  -     Hepatitis C Antibody    2. Screening for HIV (human immunodeficiency virus)  -     HIV-1 / O / 2 Ag / Antibody    3. Vaginal discharge  -     NuSwab VG+ - Swab, Vagina    4. Screening examination for STI  -     RPR Qualitative with Reflex to Quant    5. Possible exposure to STI  -     NuSwab VG+ - Swab, Vagina  -     HIV-1 / O / 2 Ag / Antibody  -     Hepatitis C Antibody  -     RPR Qualitative with Reflex to Quant       Age appropriate preventive care reviewed including cancer screenings, safety measures, mental health concerns,  supplements, prevention of CV disease and DM, etc. Handout provided.    F/u with cardiology as scheduled. Pt advised to eat a heart healthy diet and get regular aerobic exercise.    Routine f/u in 1 year for annual check up, f/u sooner as needed/instructed.  I will contact patient regarding test results and provide instructions regarding any necessary changes in plan of care.  Patient was encouraged to keep me informed of any acute changes, lack of improvement, or any new concerning symptoms.  Pt is aware of reasons to seek emergent care.  Patient voiced understanding of all instructions and denied further questions.    Please note that portions of this note may have been completed with a voice recognition program.

## 2025-04-05 LAB
A VAGINAE DNA VAG QL NAA+PROBE: NORMAL SCORE
ALBUMIN SERPL-MCNC: 4.3 G/DL (ref 3.5–5.2)
ALBUMIN/GLOB SERPL: 2.2 G/DL
ALP SERPL-CCNC: 71 U/L (ref 39–117)
ALT SERPL-CCNC: 34 U/L (ref 1–33)
AST SERPL-CCNC: 21 U/L (ref 1–32)
BASOPHILS # BLD AUTO: 0.07 10*3/MM3 (ref 0–0.2)
BASOPHILS NFR BLD AUTO: 1.1 % (ref 0–1.5)
BILIRUB SERPL-MCNC: 0.5 MG/DL (ref 0–1.2)
BUN SERPL-MCNC: 14 MG/DL (ref 6–20)
BUN/CREAT SERPL: 18.4 (ref 7–25)
BVAB2 DNA VAG QL NAA+PROBE: NORMAL SCORE
C ALBICANS DNA VAG QL NAA+PROBE: NEGATIVE
C GLABRATA DNA VAG QL NAA+PROBE: NEGATIVE
C TRACH DNA SPEC QL NAA+PROBE: NEGATIVE
CALCIUM SERPL-MCNC: 9.6 MG/DL (ref 8.6–10.5)
CHLORIDE SERPL-SCNC: 105 MMOL/L (ref 98–107)
CHOLEST SERPL-MCNC: 181 MG/DL (ref 0–200)
CO2 SERPL-SCNC: 23.8 MMOL/L (ref 22–29)
CREAT SERPL-MCNC: 0.76 MG/DL (ref 0.57–1)
EGFRCR SERPLBLD CKD-EPI 2021: 99.2 ML/MIN/1.73
EOSINOPHIL # BLD AUTO: 0.15 10*3/MM3 (ref 0–0.4)
EOSINOPHIL NFR BLD AUTO: 2.4 % (ref 0.3–6.2)
ERYTHROCYTE [DISTWIDTH] IN BLOOD BY AUTOMATED COUNT: 12.7 % (ref 12.3–15.4)
GLOBULIN SER CALC-MCNC: 2 GM/DL
GLUCOSE SERPL-MCNC: 97 MG/DL (ref 65–99)
HBA1C MFR BLD: 5.1 % (ref 4.8–5.6)
HCT VFR BLD AUTO: 45 % (ref 34–46.6)
HCV IGG SERPL QL IA: NON REACTIVE
HDLC SERPL-MCNC: 75 MG/DL (ref 40–60)
HGB BLD-MCNC: 15.3 G/DL (ref 12–15.9)
HIV 1+2 AB+HIV1 P24 AG SERPL QL IA: NON REACTIVE
IMM GRANULOCYTES # BLD AUTO: 0.02 10*3/MM3 (ref 0–0.05)
IMM GRANULOCYTES NFR BLD AUTO: 0.3 % (ref 0–0.5)
LDLC SERPL CALC-MCNC: 86 MG/DL (ref 0–100)
LYMPHOCYTES # BLD AUTO: 1.96 10*3/MM3 (ref 0.7–3.1)
LYMPHOCYTES NFR BLD AUTO: 31.8 % (ref 19.6–45.3)
MCH RBC QN AUTO: 30.9 PG (ref 26.6–33)
MCHC RBC AUTO-ENTMCNC: 34 G/DL (ref 31.5–35.7)
MCV RBC AUTO: 90.9 FL (ref 79–97)
MEGA1 DNA VAG QL NAA+PROBE: NORMAL SCORE
MONOCYTES # BLD AUTO: 0.3 10*3/MM3 (ref 0.1–0.9)
MONOCYTES NFR BLD AUTO: 4.9 % (ref 5–12)
N GONORRHOEA DNA VAG QL NAA+PROBE: NEGATIVE
NEUTROPHILS # BLD AUTO: 3.66 10*3/MM3 (ref 1.7–7)
NEUTROPHILS NFR BLD AUTO: 59.5 % (ref 42.7–76)
NRBC BLD AUTO-RTO: 0 /100 WBC (ref 0–0.2)
PLATELET # BLD AUTO: 273 10*3/MM3 (ref 140–450)
POTASSIUM SERPL-SCNC: 4.4 MMOL/L (ref 3.5–5.2)
PROT SERPL-MCNC: 6.3 G/DL (ref 6–8.5)
RBC # BLD AUTO: 4.95 10*6/MM3 (ref 3.77–5.28)
RPR SER QL: NON REACTIVE
SODIUM SERPL-SCNC: 139 MMOL/L (ref 136–145)
T VAGINALIS DNA VAG QL NAA+PROBE: NEGATIVE
TRIGL SERPL-MCNC: 117 MG/DL (ref 0–150)
TSH SERPL DL<=0.005 MIU/L-ACNC: 1.92 UIU/ML (ref 0.27–4.2)
VLDLC SERPL CALC-MCNC: 20 MG/DL (ref 5–40)
WBC # BLD AUTO: 6.16 10*3/MM3 (ref 3.4–10.8)

## 2025-07-25 DIAGNOSIS — I10 ESSENTIAL HYPERTENSION: Chronic | ICD-10-CM

## 2025-07-25 RX ORDER — CARVEDILOL 25 MG/1
25 TABLET ORAL 2 TIMES DAILY WITH MEALS
Qty: 60 TABLET | Refills: 0 | Status: SHIPPED | OUTPATIENT
Start: 2025-07-25

## 2025-08-19 ENCOUNTER — TELEPHONE (OUTPATIENT)
Dept: NEUROSURGERY | Facility: CLINIC | Age: 45
End: 2025-08-19

## 2025-08-20 DIAGNOSIS — I10 ESSENTIAL HYPERTENSION: Chronic | ICD-10-CM

## 2025-08-20 RX ORDER — VALACYCLOVIR HYDROCHLORIDE 500 MG/1
500 TABLET, FILM COATED ORAL DAILY
Qty: 102 TABLET | Refills: 2 | Status: SHIPPED | OUTPATIENT
Start: 2025-08-20

## 2025-08-20 RX ORDER — ATORVASTATIN CALCIUM 80 MG/1
80 TABLET, FILM COATED ORAL DAILY
Qty: 90 TABLET | Refills: 1 | Status: SHIPPED | OUTPATIENT
Start: 2025-08-20

## 2025-08-20 RX ORDER — CARVEDILOL 25 MG/1
25 TABLET ORAL 2 TIMES DAILY WITH MEALS
Qty: 180 TABLET | Refills: 1 | Status: SHIPPED | OUTPATIENT
Start: 2025-08-20

## (undated) DEVICE — Device: Brand: CERENE CRYOTHERAPY DEVICE

## (undated) DEVICE — SLV SCD CALF HEMOFORCE DVT THERP REPROC MD

## (undated) DEVICE — INFLATION DEVICE KIT: Brand: ENCORE™ 26 ADVANTAGE KIT

## (undated) DEVICE — RICH LAVH: Brand: MEDLINE INDUSTRIES, INC.

## (undated) DEVICE — PAD TRENDELENBURG W/RAIL STRAP

## (undated) DEVICE — RUNTHROUGH NS EXTRA FLOPPY PTCA GUIDEWIRE: Brand: RUNTHROUGH

## (undated) DEVICE — CATHETER,URETHRAL,REDRUBBER,STRL,16FR: Brand: MEDLINE

## (undated) DEVICE — TR BAND RADIAL ARTERY COMPRESSION DEVICE: Brand: TR BAND

## (undated) DEVICE — CATH F6 ST JR 4 100CM: Brand: SUPERTORQUE

## (undated) DEVICE — ST TBG PNEUMOCLEAR EVAC SMOKE HIFLO

## (undated) DEVICE — GLIDESHEATH SLENDER STAINLESS STEEL KIT: Brand: GLIDESHEATH SLENDER

## (undated) DEVICE — GW INQWIRE FC PTFE STD J/1.5 .035 260

## (undated) DEVICE — GLV SURG BIOGEL M LTX PF 8

## (undated) DEVICE — SOL IRR H2O BTL 1000ML STRL

## (undated) DEVICE — SUT ETHLN 4/0 PS2 PLSTC 1667G

## (undated) DEVICE — GW EMR FIX EXCHG J STD .035 3MM 260CM

## (undated) DEVICE — RADIFOCUS OPTITORQUE ANGIOGRAPHIC CATHETER: Brand: OPTITORQUE

## (undated) DEVICE — PATIENT RETURN ELECTRODE, SINGLE-USE, CONTACT QUALITY MONITORING, ADULT, WITH 9FT CORD, FOR PATIENTS WEIGING OVER 33LBS. (15KG): Brand: MEGADYNE

## (undated) DEVICE — GUIDE CATHETER: Brand: MACH1™

## (undated) DEVICE — KT CATH IMG DRAGONFLY/OPSTAR 2.7F 135CM

## (undated) DEVICE — ENDOPATH XCEL BLADELESS TROCARS WITH STABILITY SLEEVES: Brand: ENDOPATH XCEL

## (undated) DEVICE — EXHAUST COLLECTION BAG, S1 (SINGLE BAG IN POUCH WITH IFU), ACCESSORY TO CERENE CRYOTHERAPY DEVICE: Brand: EXHAUST COLLECTION BAG